# Patient Record
Sex: MALE | Race: BLACK OR AFRICAN AMERICAN | Employment: OTHER | URBAN - METROPOLITAN AREA
[De-identification: names, ages, dates, MRNs, and addresses within clinical notes are randomized per-mention and may not be internally consistent; named-entity substitution may affect disease eponyms.]

---

## 2017-01-02 ENCOUNTER — TELEPHONE (OUTPATIENT)
Dept: SURGERY | Age: 67
End: 2017-01-02

## 2017-01-06 ENCOUNTER — OFFICE VISIT (OUTPATIENT)
Dept: INTERNAL MEDICINE | Age: 67
End: 2017-01-06

## 2017-01-06 VITALS
RESPIRATION RATE: 16 BRPM | HEIGHT: 67 IN | SYSTOLIC BLOOD PRESSURE: 132 MMHG | TEMPERATURE: 97.5 F | WEIGHT: 259 LBS | DIASTOLIC BLOOD PRESSURE: 82 MMHG | HEART RATE: 66 BPM | OXYGEN SATURATION: 98 % | BODY MASS INDEX: 40.65 KG/M2

## 2017-01-06 DIAGNOSIS — Z98.890 S/P COLONOSCOPY WITH POLYPECTOMY: ICD-10-CM

## 2017-01-06 DIAGNOSIS — N52.9 ERECTILE DYSFUNCTION, UNSPECIFIED ERECTILE DYSFUNCTION TYPE: ICD-10-CM

## 2017-01-06 DIAGNOSIS — I10 ESSENTIAL HYPERTENSION, BENIGN: Primary | ICD-10-CM

## 2017-01-06 DIAGNOSIS — E78.5 DYSLIPIDEMIA: ICD-10-CM

## 2017-01-06 DIAGNOSIS — D12.6 TUBULAR ADENOMA OF COLON: ICD-10-CM

## 2017-01-06 DIAGNOSIS — Z11.59 NEED FOR HEPATITIS C SCREENING TEST: ICD-10-CM

## 2017-01-06 DIAGNOSIS — E66.01 MORBID OBESITY DUE TO EXCESS CALORIES (HCC): ICD-10-CM

## 2017-01-06 DIAGNOSIS — Z23 NEED FOR PNEUMOCOCCAL VACCINATION: ICD-10-CM

## 2017-01-06 DIAGNOSIS — Z11.4 SCREENING FOR HIV (HUMAN IMMUNODEFICIENCY VIRUS): ICD-10-CM

## 2017-01-06 PROCEDURE — 90670 PCV13 VACCINE IM: CPT | Performed by: INTERNAL MEDICINE

## 2017-01-06 PROCEDURE — G0009 ADMIN PNEUMOCOCCAL VACCINE: HCPCS | Performed by: INTERNAL MEDICINE

## 2017-01-06 PROCEDURE — 99214 OFFICE O/P EST MOD 30 MIN: CPT | Performed by: INTERNAL MEDICINE

## 2017-01-06 RX ORDER — LISINOPRIL 20 MG/1
TABLET ORAL
Qty: 30 TABLET | Refills: 0 | Status: SHIPPED | OUTPATIENT
Start: 2017-01-06 | End: 2017-04-07 | Stop reason: SDUPTHER

## 2017-01-06 RX ORDER — AMLODIPINE BESYLATE 10 MG/1
TABLET ORAL
Qty: 30 TABLET | Refills: 0 | Status: SHIPPED | OUTPATIENT
Start: 2017-01-06 | End: 2017-04-07 | Stop reason: SDUPTHER

## 2017-01-06 RX ORDER — SIMVASTATIN 20 MG
TABLET ORAL
Qty: 30 TABLET | Refills: 0 | Status: SHIPPED | OUTPATIENT
Start: 2017-01-06 | End: 2017-04-07 | Stop reason: SDUPTHER

## 2017-01-06 RX ORDER — HYDROCHLOROTHIAZIDE 12.5 MG/1
TABLET ORAL
Qty: 30 TABLET | Refills: 0 | Status: SHIPPED | OUTPATIENT
Start: 2017-01-06 | End: 2017-04-07 | Stop reason: SDUPTHER

## 2017-01-06 RX ORDER — TADALAFIL 10 MG/1
TABLET ORAL
Qty: 6 TABLET | Refills: 1 | Status: SHIPPED | OUTPATIENT
Start: 2017-01-06 | End: 2018-03-12 | Stop reason: SDUPTHER

## 2017-01-06 ASSESSMENT — ENCOUNTER SYMPTOMS
SHORTNESS OF BREATH: 0
ABDOMINAL PAIN: 0

## 2017-01-07 ASSESSMENT — ENCOUNTER SYMPTOMS
NAUSEA: 0
CONSTIPATION: 0
VOMITING: 0

## 2017-03-27 DIAGNOSIS — Z11.59 NEED FOR HEPATITIS C SCREENING TEST: ICD-10-CM

## 2017-03-27 DIAGNOSIS — E78.5 DYSLIPIDEMIA: ICD-10-CM

## 2017-03-27 DIAGNOSIS — I10 ESSENTIAL HYPERTENSION, BENIGN: ICD-10-CM

## 2017-03-27 DIAGNOSIS — Z11.4 SCREENING FOR HIV (HUMAN IMMUNODEFICIENCY VIRUS): ICD-10-CM

## 2017-03-27 LAB
ALBUMIN SERPL-MCNC: 3.9 G/DL (ref 3.9–4.9)
ALP BLD-CCNC: 81 U/L (ref 35–104)
ALT SERPL-CCNC: 13 U/L (ref 0–41)
ANION GAP SERPL CALCULATED.3IONS-SCNC: 10 MEQ/L (ref 7–13)
AST SERPL-CCNC: 17 U/L (ref 0–40)
BILIRUB SERPL-MCNC: 0.8 MG/DL (ref 0–1.2)
BILIRUBIN URINE: NEGATIVE
BLOOD, URINE: NEGATIVE
BUN BLDV-MCNC: 11 MG/DL (ref 8–23)
CALCIUM SERPL-MCNC: 9.8 MG/DL (ref 8.6–10.2)
CHLORIDE BLD-SCNC: 103 MEQ/L (ref 98–107)
CHOLESTEROL, TOTAL: 174 MG/DL (ref 0–199)
CLARITY: CLEAR
CO2: 27 MEQ/L (ref 22–29)
COLOR: YELLOW
CREAT SERPL-MCNC: 0.91 MG/DL (ref 0.7–1.2)
GFR AFRICAN AMERICAN: >60
GFR NON-AFRICAN AMERICAN: >60
GLOBULIN: 2.9 G/DL (ref 2.3–3.5)
GLUCOSE BLD-MCNC: 119 MG/DL (ref 74–109)
GLUCOSE URINE: NEGATIVE MG/DL
HCT VFR BLD CALC: 41.7 % (ref 42–52)
HDLC SERPL-MCNC: 50 MG/DL (ref 40–59)
HEMOGLOBIN: 13.4 G/DL (ref 14–18)
HEPATITIS C ANTIBODY INTERPRETATION: REACTIVE
KETONES, URINE: NEGATIVE MG/DL
LDL CHOLESTEROL CALCULATED: 106 MG/DL (ref 0–129)
LEUKOCYTE ESTERASE, URINE: NEGATIVE
MCH RBC QN AUTO: 28.5 PG (ref 27–31.3)
MCHC RBC AUTO-ENTMCNC: 32.2 % (ref 33–37)
MCV RBC AUTO: 88.5 FL (ref 80–100)
NITRITE, URINE: NEGATIVE
PDW BLD-RTO: 12.8 % (ref 11.5–14.5)
PH UA: 6.5 (ref 5–9)
PLATELET # BLD: 212 K/UL (ref 130–400)
POTASSIUM SERPL-SCNC: 4.1 MEQ/L (ref 3.5–5.1)
PROTEIN UA: NEGATIVE MG/DL
RBC # BLD: 4.71 M/UL (ref 4.7–6.1)
SODIUM BLD-SCNC: 140 MEQ/L (ref 132–144)
SPECIFIC GRAVITY UA: 1.02 (ref 1–1.03)
TOTAL PROTEIN: 6.8 G/DL (ref 6.4–8.1)
TRIGL SERPL-MCNC: 91 MG/DL (ref 0–200)
TSH SERPL DL<=0.05 MIU/L-ACNC: 0.55 UIU/ML (ref 0.27–4.2)
UROBILINOGEN, URINE: 1 E.U./DL
WBC # BLD: 8.3 K/UL (ref 4.8–10.8)

## 2017-03-28 LAB — HIV-1 AND HIV-2 ANTIBODIES: NEGATIVE

## 2017-04-07 ENCOUNTER — OFFICE VISIT (OUTPATIENT)
Dept: INTERNAL MEDICINE | Age: 67
End: 2017-04-07

## 2017-04-07 VITALS
DIASTOLIC BLOOD PRESSURE: 74 MMHG | RESPIRATION RATE: 16 BRPM | HEART RATE: 78 BPM | BODY MASS INDEX: 40.49 KG/M2 | WEIGHT: 258 LBS | HEIGHT: 67 IN | TEMPERATURE: 97.6 F | OXYGEN SATURATION: 98 % | SYSTOLIC BLOOD PRESSURE: 130 MMHG

## 2017-04-07 DIAGNOSIS — I10 ESSENTIAL HYPERTENSION, BENIGN: ICD-10-CM

## 2017-04-07 DIAGNOSIS — D64.9 NORMOCYTIC ANEMIA: ICD-10-CM

## 2017-04-07 DIAGNOSIS — E66.01 MORBID OBESITY DUE TO EXCESS CALORIES (HCC): ICD-10-CM

## 2017-04-07 DIAGNOSIS — E78.5 DYSLIPIDEMIA: ICD-10-CM

## 2017-04-07 DIAGNOSIS — B19.20 HEPATITIS C VIRUS INFECTION WITHOUT HEPATIC COMA, UNSPECIFIED CHRONICITY: Primary | ICD-10-CM

## 2017-04-07 DIAGNOSIS — R76.8 HEPATITIS C ANTIBODY TEST POSITIVE: ICD-10-CM

## 2017-04-07 PROCEDURE — 3017F COLORECTAL CA SCREEN DOC REV: CPT | Performed by: INTERNAL MEDICINE

## 2017-04-07 PROCEDURE — 1036F TOBACCO NON-USER: CPT | Performed by: INTERNAL MEDICINE

## 2017-04-07 PROCEDURE — 99214 OFFICE O/P EST MOD 30 MIN: CPT | Performed by: INTERNAL MEDICINE

## 2017-04-07 PROCEDURE — 1123F ACP DISCUSS/DSCN MKR DOCD: CPT | Performed by: INTERNAL MEDICINE

## 2017-04-07 PROCEDURE — G8428 CUR MEDS NOT DOCUMENT: HCPCS | Performed by: INTERNAL MEDICINE

## 2017-04-07 PROCEDURE — 4040F PNEUMOC VAC/ADMIN/RCVD: CPT | Performed by: INTERNAL MEDICINE

## 2017-04-07 PROCEDURE — G8417 CALC BMI ABV UP PARAM F/U: HCPCS | Performed by: INTERNAL MEDICINE

## 2017-04-07 RX ORDER — AMLODIPINE BESYLATE 10 MG/1
TABLET ORAL
Qty: 90 TABLET | Refills: 0 | Status: SHIPPED | OUTPATIENT
Start: 2017-04-07 | End: 2017-07-05 | Stop reason: SDUPTHER

## 2017-04-07 RX ORDER — LISINOPRIL 20 MG/1
TABLET ORAL
Qty: 90 TABLET | Refills: 0 | Status: SHIPPED | OUTPATIENT
Start: 2017-04-07 | End: 2017-07-05 | Stop reason: SDUPTHER

## 2017-04-07 RX ORDER — SIMVASTATIN 20 MG
TABLET ORAL
Qty: 90 TABLET | Refills: 1 | Status: SHIPPED | OUTPATIENT
Start: 2017-04-07 | End: 2017-07-05 | Stop reason: SDUPTHER

## 2017-04-07 RX ORDER — HYDROCHLOROTHIAZIDE 12.5 MG/1
TABLET ORAL
Qty: 90 TABLET | Refills: 0 | Status: SHIPPED | OUTPATIENT
Start: 2017-04-07 | End: 2017-07-05 | Stop reason: SDUPTHER

## 2017-04-12 DIAGNOSIS — R76.8 HEPATITIS C ANTIBODY TEST POSITIVE: ICD-10-CM

## 2017-04-12 DIAGNOSIS — B19.20 HEPATITIS C VIRUS INFECTION WITHOUT HEPATIC COMA, UNSPECIFIED CHRONICITY: ICD-10-CM

## 2017-04-12 LAB
ETHANOL PERCENT: NORMAL G/DL
ETHANOL: <10 MG/DL (ref 0–0.08)
HBV SURFACE AB TITR SER: REACTIVE MIU/ML

## 2017-04-13 LAB — AFP-TUMOR MARKER: 2 NG/ML (ref 0–9)

## 2017-04-14 LAB
AMPHETAMINE SCREEN, URINE: NEGATIVE NG/ML
BARBITURATE SCREEN URINE: NEGATIVE NG/ML
BENZODIAZEPINE SCREEN, URINE: NEGATIVE NG/ML
CANNABINOID SCREEN URINE: NEGATIVE NG/ML
COCAINE METABOLITE SCREEN URINE: NEGATIVE NG/ML
CREATININE URINE: 179.3 MG/DL (ref 20–400)
Lab: NORMAL
MDMA URINE: NEGATIVE NG/ML
OPIATE SCREEN URINE: NEGATIVE NG/ML
OXYCODONE SCREEN URINE: NEGATIVE NG/ML
PHENCYCLIDINE SCREEN URINE: NEGATIVE NG/ML

## 2017-04-15 LAB
EER HEPATITIS C RNA PCR QUANT W/ GENOTYPE RFLX: NORMAL
HCV RNA QNT REAL-TIME PCR INTERP: NOT DETECTED
HCV RNA, QUANTITATIVE REAL TIME PCR: <1.2 LOG IU
HEPATITIS C RNA PCR QUANT: <15 IU/ML

## 2017-04-17 LAB
Lab: 5661
REPORT: NORMAL
THIS TEST SENT TO: NORMAL

## 2017-04-22 ASSESSMENT — ENCOUNTER SYMPTOMS
COUGH: 0
NAUSEA: 0
BACK PAIN: 0
ABDOMINAL PAIN: 0
COLOR CHANGE: 0
CONSTIPATION: 0
VOMITING: 0
ABDOMINAL DISTENTION: 0
DIARRHEA: 0
SHORTNESS OF BREATH: 0
BLOOD IN STOOL: 0
WHEEZING: 0

## 2017-07-05 ENCOUNTER — OFFICE VISIT (OUTPATIENT)
Dept: INTERNAL MEDICINE | Age: 67
End: 2017-07-05

## 2017-07-05 VITALS
TEMPERATURE: 97.4 F | HEART RATE: 64 BPM | OXYGEN SATURATION: 100 % | HEIGHT: 67 IN | BODY MASS INDEX: 40.02 KG/M2 | RESPIRATION RATE: 16 BRPM | DIASTOLIC BLOOD PRESSURE: 94 MMHG | SYSTOLIC BLOOD PRESSURE: 174 MMHG | WEIGHT: 255 LBS

## 2017-07-05 DIAGNOSIS — R76.8 HEPATITIS C ANTIBODY TEST POSITIVE: ICD-10-CM

## 2017-07-05 DIAGNOSIS — E78.5 DYSLIPIDEMIA: ICD-10-CM

## 2017-07-05 DIAGNOSIS — I10 ESSENTIAL HYPERTENSION, BENIGN: Primary | ICD-10-CM

## 2017-07-05 DIAGNOSIS — E66.09 NON MORBID OBESITY DUE TO EXCESS CALORIES: ICD-10-CM

## 2017-07-05 DIAGNOSIS — Z91.14 NONCOMPLIANCE WITH MEDICATION REGIMEN: ICD-10-CM

## 2017-07-05 DIAGNOSIS — R76.8 HEPATITIS B ANTIBODY POSITIVE: ICD-10-CM

## 2017-07-05 PROCEDURE — 3017F COLORECTAL CA SCREEN DOC REV: CPT | Performed by: INTERNAL MEDICINE

## 2017-07-05 PROCEDURE — 1123F ACP DISCUSS/DSCN MKR DOCD: CPT | Performed by: INTERNAL MEDICINE

## 2017-07-05 PROCEDURE — 4040F PNEUMOC VAC/ADMIN/RCVD: CPT | Performed by: INTERNAL MEDICINE

## 2017-07-05 PROCEDURE — G8417 CALC BMI ABV UP PARAM F/U: HCPCS | Performed by: INTERNAL MEDICINE

## 2017-07-05 PROCEDURE — 1036F TOBACCO NON-USER: CPT | Performed by: INTERNAL MEDICINE

## 2017-07-05 PROCEDURE — 99213 OFFICE O/P EST LOW 20 MIN: CPT | Performed by: INTERNAL MEDICINE

## 2017-07-05 PROCEDURE — G8427 DOCREV CUR MEDS BY ELIG CLIN: HCPCS | Performed by: INTERNAL MEDICINE

## 2017-07-05 RX ORDER — HYDROCHLOROTHIAZIDE 12.5 MG/1
TABLET ORAL
Qty: 30 TABLET | Refills: 1 | Status: SHIPPED | OUTPATIENT
Start: 2017-07-05 | End: 2017-08-18 | Stop reason: SDUPTHER

## 2017-07-05 RX ORDER — LISINOPRIL 20 MG/1
TABLET ORAL
Qty: 30 TABLET | Refills: 1 | Status: SHIPPED | OUTPATIENT
Start: 2017-07-05 | End: 2017-08-18 | Stop reason: SDUPTHER

## 2017-07-05 RX ORDER — AMLODIPINE BESYLATE 10 MG/1
TABLET ORAL
Qty: 30 TABLET | Refills: 1 | Status: SHIPPED | OUTPATIENT
Start: 2017-07-05 | End: 2017-08-18 | Stop reason: SDUPTHER

## 2017-07-05 RX ORDER — SIMVASTATIN 20 MG
TABLET ORAL
Qty: 30 TABLET | Refills: 1 | Status: SHIPPED | OUTPATIENT
Start: 2017-07-05 | End: 2017-08-18 | Stop reason: SDUPTHER

## 2017-07-05 ASSESSMENT — ENCOUNTER SYMPTOMS
SHORTNESS OF BREATH: 0
DIARRHEA: 0
CONSTIPATION: 0
ABDOMINAL PAIN: 0
COUGH: 0

## 2017-08-03 ENCOUNTER — HOSPITAL ENCOUNTER (EMERGENCY)
Age: 67
Discharge: HOME | End: 2017-08-03
Payer: MEDICARE

## 2017-08-03 VITALS
SYSTOLIC BLOOD PRESSURE: 174 MMHG | WEIGHT: 250 LBS | DIASTOLIC BLOOD PRESSURE: 88 MMHG | BODY MASS INDEX: 39.24 KG/M2 | TEMPERATURE: 98.2 F | RESPIRATION RATE: 18 BRPM | HEART RATE: 60 BPM | OXYGEN SATURATION: 98 % | HEIGHT: 67 IN

## 2017-08-03 DIAGNOSIS — N30.01 ACUTE CYSTITIS WITH HEMATURIA: ICD-10-CM

## 2017-08-03 DIAGNOSIS — N34.2 URETHRITIS: Primary | ICD-10-CM

## 2017-08-03 LAB
BACTERIA: ABNORMAL /HPF
BILIRUBIN URINE: NEGATIVE
BLOOD, URINE: ABNORMAL
CLARITY: ABNORMAL
COLOR: YELLOW
EPITHELIAL CELLS, UA: ABNORMAL /HPF
GLUCOSE URINE: NEGATIVE MG/DL
KETONES, URINE: NEGATIVE MG/DL
LEUKOCYTE ESTERASE, URINE: ABNORMAL
MUCUS: PRESENT
NITRITE, URINE: NEGATIVE
PH UA: 6 (ref 5–9)
PROTEIN UA: 30 MG/DL
RBC UA: ABNORMAL /HPF (ref 0–2)
SPECIFIC GRAVITY UA: 1.02 (ref 1–1.03)
UROBILINOGEN, URINE: 1 E.U./DL
WBC UA: ABNORMAL /HPF (ref 0–5)

## 2017-08-03 PROCEDURE — 81001 URINALYSIS AUTO W/SCOPE: CPT

## 2017-08-03 PROCEDURE — 2580000003 HC RX 258

## 2017-08-03 PROCEDURE — 96372 THER/PROPH/DIAG INJ SC/IM: CPT

## 2017-08-03 PROCEDURE — 99283 EMERGENCY DEPT VISIT LOW MDM: CPT

## 2017-08-03 PROCEDURE — 87491 CHLMYD TRACH DNA AMP PROBE: CPT

## 2017-08-03 PROCEDURE — A4216 STERILE WATER/SALINE, 10 ML: HCPCS

## 2017-08-03 PROCEDURE — 87591 N.GONORRHOEAE DNA AMP PROB: CPT

## 2017-08-03 PROCEDURE — 6370000000 HC RX 637 (ALT 250 FOR IP): Performed by: PHYSICIAN ASSISTANT

## 2017-08-03 PROCEDURE — 6360000002 HC RX W HCPCS: Performed by: PHYSICIAN ASSISTANT

## 2017-08-03 RX ORDER — METRONIDAZOLE 500 MG/1
2000 TABLET ORAL ONCE
Status: COMPLETED | OUTPATIENT
Start: 2017-08-03 | End: 2017-08-03

## 2017-08-03 RX ORDER — CEFTRIAXONE SODIUM 250 MG/1
250 INJECTION, POWDER, FOR SOLUTION INTRAMUSCULAR; INTRAVENOUS ONCE
Status: COMPLETED | OUTPATIENT
Start: 2017-08-03 | End: 2017-08-03

## 2017-08-03 RX ORDER — AZITHROMYCIN 250 MG/1
1000 TABLET, FILM COATED ORAL ONCE
Status: COMPLETED | OUTPATIENT
Start: 2017-08-03 | End: 2017-08-03

## 2017-08-03 RX ORDER — CIPROFLOXACIN 500 MG/1
500 TABLET, FILM COATED ORAL 2 TIMES DAILY
Qty: 20 TABLET | Refills: 0 | Status: SHIPPED | OUTPATIENT
Start: 2017-08-03 | End: 2017-08-13

## 2017-08-03 RX ADMIN — WATER 10 ML: 1 INJECTION INTRAMUSCULAR; INTRAVENOUS; SUBCUTANEOUS at 14:47

## 2017-08-03 RX ADMIN — CEFTRIAXONE 250 MG: 250 INJECTION, POWDER, FOR SOLUTION INTRAMUSCULAR; INTRAVENOUS at 14:45

## 2017-08-03 RX ADMIN — AZITHROMYCIN 1000 MG: 250 TABLET, FILM COATED ORAL at 14:46

## 2017-08-03 RX ADMIN — METRONIDAZOLE 2000 MG: 500 TABLET ORAL at 14:47

## 2017-08-03 ASSESSMENT — ENCOUNTER SYMPTOMS
EYES NEGATIVE: 1
GASTROINTESTINAL NEGATIVE: 1
RESPIRATORY NEGATIVE: 1

## 2017-08-07 LAB
C. TRACHOMATIS DNA ,URINE: POSITIVE
N. GONORRHOEAE DNA, URINE: NEGATIVE

## 2017-08-18 DIAGNOSIS — R30.0 DYSURIA: ICD-10-CM

## 2017-08-18 DIAGNOSIS — Z86.19 HISTORY OF CHLAMYDIA: ICD-10-CM

## 2017-08-22 LAB
C. TRACHOMATIS DNA ,URINE: NEGATIVE
N. GONORRHOEAE DNA, URINE: NEGATIVE

## 2018-03-12 ENCOUNTER — OFFICE VISIT (OUTPATIENT)
Dept: FAMILY MEDICINE CLINIC | Age: 68
End: 2018-03-12
Payer: MEDICARE

## 2018-03-12 VITALS
BODY MASS INDEX: 40.74 KG/M2 | DIASTOLIC BLOOD PRESSURE: 80 MMHG | OXYGEN SATURATION: 98 % | SYSTOLIC BLOOD PRESSURE: 108 MMHG | WEIGHT: 259.6 LBS | TEMPERATURE: 96.9 F | HEART RATE: 78 BPM | HEIGHT: 67 IN | RESPIRATION RATE: 16 BRPM

## 2018-03-12 DIAGNOSIS — E66.01 MORBID OBESITY WITH BMI OF 40.0-44.9, ADULT (HCC): ICD-10-CM

## 2018-03-12 DIAGNOSIS — E78.5 DYSLIPIDEMIA: ICD-10-CM

## 2018-03-12 DIAGNOSIS — Z76.0 MEDICATION REFILL: ICD-10-CM

## 2018-03-12 DIAGNOSIS — N52.9 ERECTILE DYSFUNCTION, UNSPECIFIED ERECTILE DYSFUNCTION TYPE: ICD-10-CM

## 2018-03-12 DIAGNOSIS — I10 ESSENTIAL HYPERTENSION, BENIGN: ICD-10-CM

## 2018-03-12 PROCEDURE — 3288F FALL RISK ASSESSMENT DOCD: CPT | Performed by: PHYSICIAN ASSISTANT

## 2018-03-12 PROCEDURE — 99214 OFFICE O/P EST MOD 30 MIN: CPT | Performed by: PHYSICIAN ASSISTANT

## 2018-03-12 PROCEDURE — G8510 SCR DEP NEG, NO PLAN REQD: HCPCS | Performed by: PHYSICIAN ASSISTANT

## 2018-03-12 RX ORDER — HYDROCHLOROTHIAZIDE 12.5 MG/1
TABLET ORAL
Qty: 30 TABLET | Refills: 5 | Status: SHIPPED | OUTPATIENT
Start: 2018-03-12 | End: 2019-04-29 | Stop reason: SDUPTHER

## 2018-03-12 RX ORDER — SIMVASTATIN 20 MG
TABLET ORAL
Qty: 30 TABLET | Refills: 5 | Status: SHIPPED | OUTPATIENT
Start: 2018-03-12 | End: 2019-04-29 | Stop reason: SDUPTHER

## 2018-03-12 RX ORDER — LATANOPROST 50 UG/ML
SOLUTION/ DROPS OPHTHALMIC
COMMUNITY
Start: 2018-01-10

## 2018-03-12 RX ORDER — TADALAFIL 10 MG/1
TABLET ORAL
Qty: 6 TABLET | Refills: 1 | Status: SHIPPED | OUTPATIENT
Start: 2018-03-12 | End: 2020-05-04 | Stop reason: SDUPTHER

## 2018-03-12 RX ORDER — LISINOPRIL 20 MG/1
TABLET ORAL
Qty: 30 TABLET | Refills: 5 | Status: SHIPPED | OUTPATIENT
Start: 2018-03-12 | End: 2019-04-29 | Stop reason: SDUPTHER

## 2018-03-12 RX ORDER — DORZOLAMIDE HYDROCHLORIDE AND TIMOLOL MALEATE 20; 5 MG/ML; MG/ML
SOLUTION/ DROPS OPHTHALMIC
COMMUNITY
Start: 2018-01-10

## 2018-03-12 RX ORDER — AMLODIPINE BESYLATE 10 MG/1
TABLET ORAL
Qty: 30 TABLET | Refills: 5 | Status: SHIPPED | OUTPATIENT
Start: 2018-03-12 | End: 2019-04-29 | Stop reason: SDUPTHER

## 2018-03-12 ASSESSMENT — ENCOUNTER SYMPTOMS
ABDOMINAL PAIN: 0
COUGH: 0
CONSTIPATION: 0
TROUBLE SWALLOWING: 0
NAUSEA: 0
CHEST TIGHTNESS: 0
EYE REDNESS: 0
WHEEZING: 0
BLOOD IN STOOL: 0
EYE PAIN: 0
SHORTNESS OF BREATH: 0
COLOR CHANGE: 0
ABDOMINAL DISTENTION: 0
BACK PAIN: 0
DIARRHEA: 0
PHOTOPHOBIA: 0
SINUS PRESSURE: 0

## 2018-03-12 ASSESSMENT — PATIENT HEALTH QUESTIONNAIRE - PHQ9
2. FEELING DOWN, DEPRESSED OR HOPELESS: 0
SUM OF ALL RESPONSES TO PHQ9 QUESTIONS 1 & 2: 0
SUM OF ALL RESPONSES TO PHQ QUESTIONS 1-9: 0
1. LITTLE INTEREST OR PLEASURE IN DOING THINGS: 0

## 2018-03-12 NOTE — PROGRESS NOTES
Negative for dizziness, weakness, light-headedness and headaches. Psychiatric/Behavioral: Negative for agitation, confusion, dysphoric mood and sleep disturbance. The patient is not nervous/anxious. Past Medical History:   Diagnosis Date    Diabetes mellitus, type 2 ([address] 10/11/2011    Dyslipidemia 10/11/2011    Erectile dysfunction 7/19/2012    Essential hypertension, benign 10/11/2011    History of colon polyps     adenomatous polyps     Past Surgical History:   Procedure Laterality Date    [facility]    COLONOSCOPY  9/26/11    Dr Gwen Jimenez    COLONOSCOPY N/A 11/11/2016    COLONOSCOPY performed by Andrea Sanz MD at [place] [id number] Right 5/1/14    [facility]     Social History     Social History    Marital status: Single     Spouse name: N/A    Number of children: N/A    Years of education: N/A     Occupational History    Not on file. Social History Main Topics    Smoking status: Former Smoker     Packs/day: 1.50     Years: 30.00     Types: Cigarettes     Quit date: 1/1/2000    Smokeless tobacco: Never Used    Alcohol use No    Drug use: Unknown    Sexual activity: Not on file     Other Topics Concern    Not on file     Social History Narrative    No narrative on file     History reviewed. No pertinent family history. No Known Allergies  Current Outpatient Prescriptions   Medication Sig Dispense Refill    latanoprost (XALATAN) 0.005 % ophthalmic solution Use 1 Drop in both eyes daily at bedtime.  dorzolamide-timolol (COSOPT) 22.3-6.8 MG/ML ophthalmic solution Use 1 Drop in both eyes twice daily.       amLODIPine (NORVASC) 10 MG tablet take 1 tablet by mouth once daily 30 tablet 5    hydrochlorothiazide (HYDRODIURIL) 12.5 MG tablet take 1 tablet by mouth once daily 30 tablet 5    lisinopril (PRINIVIL;ZESTRIL) 20 MG tablet take 1 tablet by mouth once daily 30 tablet 5    simvastatin (ZOCOR) 20 MG tablet take 1 tablet by mouth

## 2019-05-09 ENCOUNTER — OFFICE VISIT (OUTPATIENT)
Dept: FAMILY MEDICINE CLINIC | Age: 69
End: 2019-05-09
Payer: MEDICARE

## 2019-05-09 VITALS
BODY MASS INDEX: 41.44 KG/M2 | OXYGEN SATURATION: 98 % | WEIGHT: 264 LBS | TEMPERATURE: 98.1 F | HEIGHT: 67 IN | SYSTOLIC BLOOD PRESSURE: 138 MMHG | HEART RATE: 60 BPM | RESPIRATION RATE: 16 BRPM | DIASTOLIC BLOOD PRESSURE: 72 MMHG

## 2019-05-09 DIAGNOSIS — Z76.0 MEDICATION REFILL: ICD-10-CM

## 2019-05-09 DIAGNOSIS — Z13.6 SCREENING FOR AAA (AORTIC ABDOMINAL ANEURYSM): ICD-10-CM

## 2019-05-09 DIAGNOSIS — R73.09 ABNORMAL GLUCOSE: Chronic | ICD-10-CM

## 2019-05-09 DIAGNOSIS — E66.01 MORBID OBESITY WITH BMI OF 40.0-44.9, ADULT (HCC): ICD-10-CM

## 2019-05-09 DIAGNOSIS — E78.5 DYSLIPIDEMIA: ICD-10-CM

## 2019-05-09 DIAGNOSIS — I10 ESSENTIAL HYPERTENSION, BENIGN: Primary | ICD-10-CM

## 2019-05-09 DIAGNOSIS — Z20.9 EXPOSURE TO COMMUNICABLE DISEASE: ICD-10-CM

## 2019-05-09 DIAGNOSIS — E66.01 CLASS 3 SEVERE OBESITY DUE TO EXCESS CALORIES WITHOUT SERIOUS COMORBIDITY WITH BODY MASS INDEX (BMI) OF 40.0 TO 44.9 IN ADULT (HCC): ICD-10-CM

## 2019-05-09 DIAGNOSIS — Z23 NEED FOR PNEUMOCOCCAL VACCINATION: ICD-10-CM

## 2019-05-09 PROCEDURE — 90670 PCV13 VACCINE IM: CPT | Performed by: FAMILY MEDICINE

## 2019-05-09 PROCEDURE — 99214 OFFICE O/P EST MOD 30 MIN: CPT | Performed by: FAMILY MEDICINE

## 2019-05-09 PROCEDURE — G0009 ADMIN PNEUMOCOCCAL VACCINE: HCPCS | Performed by: FAMILY MEDICINE

## 2019-05-09 ASSESSMENT — PATIENT HEALTH QUESTIONNAIRE - PHQ9
1. LITTLE INTEREST OR PLEASURE IN DOING THINGS: 0
SUM OF ALL RESPONSES TO PHQ QUESTIONS 1-9: 0
SUM OF ALL RESPONSES TO PHQ9 QUESTIONS 1 & 2: 0
2. FEELING DOWN, DEPRESSED OR HOPELESS: 0
SUM OF ALL RESPONSES TO PHQ QUESTIONS 1-9: 0

## 2019-05-09 NOTE — PROGRESS NOTES
Subjective  Radha Rasp, 77 y.o. male presents today with:  Chief Complaint   Patient presents with    Annual Exam     pt states he is here for annual with labs. pt states he is not sexually active but he would also like STI. HPI    This is a new patient to me. I have reviewed the past medical and surgical history, social history and family history provided. I have reviewed  medication, previous testing and working diagnoses. I have reviewed the allergies and health maintenance information and correlated it into my decision making for this patient for care, diagnostics, consultations and treatment for today's visit. Patient is here for f/u HTN. Is compliant with meds and has no side effects from them. Avoids added salt. Tries to eat healthy. Exercises rarely. Has no chest pain, palpitations or edema. Does get short of breath after walking a block or two. Patient is concerned about sexually-transmitted infections including HIV. He states that he is not sexually active but may have some lapses in judgment occurring occasionally    No other questions and or concerns for today's visit      Review of Systems  No fevers, chills, sweats. No unintended weight loss. No abdominal pain, nausea, vomiting, diarrhea, constipation, bloody stools, black tarry stools. No rashes. No swollen glands. No penile lesions or discharge. No hematuria. No dysuria.     Past Medical History:   Diagnosis Date    Diabetes mellitus, type 2 ([address] 10/11/2011    Dyslipidemia 10/11/2011    Erectile dysfunction 7/19/2012    Essential hypertension, benign 10/11/2011    History of colon polyps     adenomatous polyps     Past Surgical History:   Procedure Laterality Date    [facility]    COLONOSCOPY  9/26/11    Dr Abraham Hughes    COLONOSCOPY N/A 11/11/2016    COLONOSCOPY performed by Silver Phillips MD at [place] [id number] Right 5/1/14    [facility]     Social History Socioeconomic History    Marital status: Single     Spouse name: Not on file    Number of children: Not on file    Years of education: Not on file    Highest education level: Not on file   Occupational History    Not on file   Social Needs    Financial resource strain: Not on file    Food insecurity:     Worry: Not on file     Inability: Not on file    Transportation needs:     Medical: Not on file     Non-medical: Not on file   Tobacco Use    Smoking status: Former Smoker     Packs/day: 1.50     Years: 30.00     Pack years: 45.00     Types: Cigarettes     Last attempt to quit: 2000     Years since quittin.3    Smokeless tobacco: Never Used   Substance and Sexual Activity    Alcohol use: No    Drug use: Not on file    Sexual activity: Not on file   Lifestyle    Physical activity:     Days per week: Not on file     Minutes per session: Not on file    Stress: Not on file   Relationships    Social connections:     Talks on phone: Not on file     Gets together: Not on file     Attends Mandaen service: Not on file     Active member of club or organization: Not on file     Attends meetings of clubs or organizations: Not on file     Relationship status: Not on file    Intimate partner violence:     Fear of current or ex partner: Not on file     Emotionally abused: Not on file     Physically abused: Not on file     Forced sexual activity: Not on file   Other Topics Concern    Not on file   Social History Narrative    Not on file     No family history on file. Allergies   Allergen Reactions    Apraclonidine      Other reaction(s): Other: See Comments  Irritation, not tolerable     Brimonidine      Other reaction(s):  Other: See Comments  Follicles      Current Outpatient Medications   Medication Sig Dispense Refill    amLODIPine (NORVASC) 10 MG tablet take 1 tablet by mouth once daily 30 tablet 0    simvastatin (ZOCOR) 20 MG tablet take 1 tablet by mouth at bedtime 30 tablet 0    hydrochlorothiazide (HYDRODIURIL) 12.5 MG tablet take 1 tablet by mouth once daily 30 tablet 5    lisinopril (PRINIVIL;ZESTRIL) 20 MG tablet take 1 tablet by mouth once daily 30 tablet 0    latanoprost (XALATAN) 0.005 % ophthalmic solution Use 1 Drop in both eyes daily at bedtime.  dorzolamide-timolol (COSOPT) 22.3-6.8 MG/ML ophthalmic solution Use 1 Drop in both eyes twice daily.  tadalafil (CIALIS) 10 MG tablet Take 1 tablet by mouth at least 60 minutes before sexual intercourse once daily as needed for Erectile Dysfunction 6 tablet 1    aspirin 81 MG EC tablet Take 81 mg by mouth daily. No current facility-administered medications for this visit. PMH, Surgical Hx, Family Hx, and Social Hxreviewed and updated. Health Maintenance reviewed. Objective    Vitals:    05/09/19 1050   BP: 138/72   Pulse: 60   Resp: 16   Temp: 98.1 °F (36.7 °C)   SpO2: 98%   Weight: 264 lb (119.7 kg)   Height: 5' 7\" (1.702 m)        Physical Exam   Constitutional: He is oriented to person, place, and time. Obese, NAD   HENT:   Head: Normocephalic and atraumatic. Eyes: Conjunctivae are normal.   Neck: Normal range of motion. Neck supple. Carotid bruit is not present. No thyromegaly present. Cardiovascular: Normal rate, regular rhythm, S1 normal, S2 normal and normal heart sounds. Pulmonary/Chest: Effort normal. He has no wheezes. He has no rales. Musculoskeletal: He exhibits no edema. Neurological: He is alert and oriented to person, place, and time. Skin: Skin is warm and dry. Psychiatric: He has a normal mood and affect.          Lab Results   Component Value Date    LABA1C 4.7 (L) 12/08/2015    LABA1C 5.0 08/05/2014    LABA1C 4.9 08/02/2013     Lab Results   Component Value Date    CREATININE 0.91 03/27/2017     Lab Results   Component Value Date    ALT 13 03/27/2017    AST 17 03/27/2017     Lab Results   Component Value Date    CHOL 174 03/27/2017    TRIG 91 03/27/2017    HDL 50 03/27/2017 [address] 106 03/27/2017        Assessment & Plan   Visit Diagnoses and Associated Orders     Essential hypertension, benign    -  Primary    Stable and well-controlled on amlodipine, hydrochlorothiazide, lisinopril. Encouraged healthy lifestyle choices and weight reduction. Class 3 severe obesity due to excess calories without serious comorbidity with body mass index (BMI) of 40.0 to 44.9 in adult [facility])        CBC With Auto Differential [id number] Custom]   - Future Order    Comprehensive Metabolic Panel [[id number] Custom]   - Future Order    Lipid, Fasting [[id number] Custom]   - Future Order         Exposure to communicable disease        Encouraged patient to go to [facility] for HIV and RPR because Medicare will not pay for it. Otherwise GC chlamydia ordered. C.trachomatis N.gonorrhoeae DNA, Urine [[id number] Custom]   - Future Order         Screening for AAA (aortic abdominal aneurysm)         SCREENING FOR AAA [ Custom]   - Future Order         Need for pneumococcal vaccination        PREVNAR 13 IM (Pneumococcal conjugate vaccine 13-valent) [[id number] Custom]           Medication refill             Dyslipidemia        Continue simvastatin. Follow labs. Morbid obesity with BMI of 40.0-44.9, adult (HCC)             Abnormal glucose        Reviewed healthy, low carb diet and importance of weight reduction. Hemoglobin A1C [[id number] Custom]   - Future Order             Reviewed complications of obesity. Discussed healthy, low carb diet and need for moderately rigorous exercise 45 minutes 5 days a week.     Reviewed with the patient: all disease processes, current clinical status, medications, activities and diet.      Side effects, adverse effects of the medication prescribed today, as well as treatment plan/ rationale and result expectations have been discussed with the patient who expresses understanding and desires to proceed.     Close follow up to evaluate treatment results and for coordination of care. I have reviewed the patient's medical history in detail and updated the computerized patient record. More than 50% of the appointment was spent in face-to-face counseling, education and care coordination. Orders Placed This Encounter   Procedures    C.trachomatis N.gonorrhoeae DNA, Urine     Standing Status:   Future     Standing Expiration Date:   5/9/2020     SCREENING FOR AAA     Standing Status:   Future     Standing Expiration Date:   5/9/2020     Order Specific Question:   Reason for exam:     Answer:   AAA screen    PREVNAR 13 IM (Pneumococcal conjugate vaccine 13-valent)    CBC With Auto Differential     Standing Status:   Future     Standing Expiration Date:   5/9/2020    Comprehensive Metabolic Panel     Standing Status:   Future     Standing Expiration Date:   5/9/2020    Lipid, Fasting     Standing Status:   Future     Standing Expiration Date:   5/9/2020    Hemoglobin A1C     Standing Status:   Future     Standing Expiration Date:   5/9/2020     No orders of the defined types were placed in this encounter. There are no discontinued medications. No follow-ups on file. Controlled Substances Monitoring:     No flowsheet data found.     Cherelle Fontanez MD

## 2019-05-10 DIAGNOSIS — E66.01 CLASS 3 SEVERE OBESITY DUE TO EXCESS CALORIES WITHOUT SERIOUS COMORBIDITY WITH BODY MASS INDEX (BMI) OF 40.0 TO 44.9 IN ADULT (HCC): ICD-10-CM

## 2019-05-10 DIAGNOSIS — R73.09 ABNORMAL GLUCOSE: Chronic | ICD-10-CM

## 2019-05-10 LAB
ALBUMIN SERPL-MCNC: 4 G/DL (ref 3.5–4.6)
ALP BLD-CCNC: 74 U/L (ref 35–104)
ALT SERPL-CCNC: 10 U/L (ref 0–41)
ANION GAP SERPL CALCULATED.3IONS-SCNC: 15 MEQ/L (ref 9–15)
AST SERPL-CCNC: 13 U/L (ref 0–40)
BASOPHILS ABSOLUTE: 0 K/UL (ref 0–0.2)
BASOPHILS RELATIVE PERCENT: 0.7 %
BILIRUB SERPL-MCNC: 0.7 MG/DL (ref 0.2–0.7)
BUN BLDV-MCNC: 16 MG/DL (ref 8–23)
CALCIUM SERPL-MCNC: 9.2 MG/DL (ref 8.5–9.9)
CHLORIDE BLD-SCNC: 102 MEQ/L (ref 95–107)
CHOLESTEROL, FASTING: 157 MG/DL (ref 0–199)
CO2: 24 MEQ/L (ref 20–31)
CREAT SERPL-MCNC: 0.94 MG/DL (ref 0.7–1.2)
EOSINOPHILS ABSOLUTE: 0.1 K/UL (ref 0–0.7)
EOSINOPHILS RELATIVE PERCENT: 1.7 %
GFR AFRICAN AMERICAN: >60
GFR NON-AFRICAN AMERICAN: >60
GLOBULIN: 2.9 G/DL (ref 2.3–3.5)
GLUCOSE BLD-MCNC: 121 MG/DL (ref 70–99)
HBA1C MFR BLD: 4.9 % (ref 4.8–5.9)
HCT VFR BLD CALC: 43.2 % (ref 42–52)
HDLC SERPL-MCNC: 45 MG/DL (ref 40–59)
HEMOGLOBIN: 13.8 G/DL (ref 14–18)
LDL CHOLESTEROL CALCULATED: 96 MG/DL (ref 0–129)
LYMPHOCYTES ABSOLUTE: 1.7 K/UL (ref 1–4.8)
LYMPHOCYTES RELATIVE PERCENT: 28.1 %
MCH RBC QN AUTO: 28.6 PG (ref 27–31.3)
MCHC RBC AUTO-ENTMCNC: 32 % (ref 33–37)
MCV RBC AUTO: 89.5 FL (ref 80–100)
MONOCYTES ABSOLUTE: 0.4 K/UL (ref 0.2–0.8)
MONOCYTES RELATIVE PERCENT: 7.1 %
NEUTROPHILS ABSOLUTE: 3.9 K/UL (ref 1.4–6.5)
NEUTROPHILS RELATIVE PERCENT: 62.4 %
PDW BLD-RTO: 12.6 % (ref 11.5–14.5)
PLATELET # BLD: 235 K/UL (ref 130–400)
POTASSIUM SERPL-SCNC: 3.7 MEQ/L (ref 3.4–4.9)
RBC # BLD: 4.83 M/UL (ref 4.7–6.1)
SODIUM BLD-SCNC: 141 MEQ/L (ref 135–144)
TOTAL PROTEIN: 6.9 G/DL (ref 6.3–8)
TRIGLYCERIDE, FASTING: 82 MG/DL (ref 0–150)
WBC # BLD: 6.2 K/UL (ref 4.8–10.8)

## 2019-05-15 LAB
C. TRACHOMATIS DNA ,URINE: NEGATIVE
N. GONORRHOEAE DNA, URINE: NEGATIVE

## 2020-05-04 ENCOUNTER — VIRTUAL VISIT (OUTPATIENT)
Dept: FAMILY MEDICINE CLINIC | Age: 70
End: 2020-05-04
Payer: MEDICARE

## 2020-05-04 PROBLEM — R73.09 ABNORMAL GLUCOSE: Chronic | Status: RESOLVED | Noted: 2019-05-09 | Resolved: 2020-05-04

## 2020-05-04 PROCEDURE — G0438 PPPS, INITIAL VISIT: HCPCS | Performed by: PHYSICIAN ASSISTANT

## 2020-05-04 RX ORDER — LISINOPRIL 20 MG/1
TABLET ORAL
Qty: 30 TABLET | Refills: 11 | Status: SHIPPED | OUTPATIENT
Start: 2020-05-04 | End: 2021-06-10

## 2020-05-04 RX ORDER — TADALAFIL 10 MG/1
TABLET ORAL
Qty: 6 TABLET | Refills: 1 | Status: SHIPPED | OUTPATIENT
Start: 2020-05-04 | End: 2020-11-10 | Stop reason: SDUPTHER

## 2020-05-04 RX ORDER — AMLODIPINE BESYLATE 10 MG/1
TABLET ORAL
Qty: 30 TABLET | Refills: 11 | Status: SHIPPED | OUTPATIENT
Start: 2020-05-04 | End: 2021-05-13

## 2020-05-04 RX ORDER — ASPIRIN 81 MG/1
81 TABLET ORAL DAILY
Qty: 30 TABLET | Refills: 11 | Status: SHIPPED | OUTPATIENT
Start: 2020-05-04

## 2020-05-04 RX ORDER — SIMVASTATIN 20 MG
TABLET ORAL
Qty: 30 TABLET | Refills: 11 | Status: SHIPPED | OUTPATIENT
Start: 2020-05-04 | End: 2021-05-13

## 2020-05-04 ASSESSMENT — PATIENT HEALTH QUESTIONNAIRE - PHQ9
SUM OF ALL RESPONSES TO PHQ QUESTIONS 1-9: 2
SUM OF ALL RESPONSES TO PHQ QUESTIONS 1-9: 2

## 2020-05-04 ASSESSMENT — ENCOUNTER SYMPTOMS
BACK PAIN: 0
BLOOD IN STOOL: 0
CHEST TIGHTNESS: 0
WHEEZING: 0
SHORTNESS OF BREATH: 1
ABDOMINAL DISTENTION: 0
TROUBLE SWALLOWING: 0
DIARRHEA: 0
ABDOMINAL PAIN: 0
PHOTOPHOBIA: 0
CONSTIPATION: 0
SINUS PRESSURE: 0
COUGH: 0
EYE PAIN: 0
COLOR CHANGE: 0
EYE REDNESS: 0
NAUSEA: 0

## 2020-05-04 ASSESSMENT — LIFESTYLE VARIABLES: HOW OFTEN DO YOU HAVE A DRINK CONTAINING ALCOHOL: 0

## 2020-05-04 NOTE — PROGRESS NOTES
2020    TELEHEALTH EVALUATION -- Audio/Visual (During FACWR-69 public health emergency)    Due to COVID 19 outbreak, patient's office visit was converted to a virtual visit. Patient was contacted and agreed to proceed with a virtual visit via Doxy. me  The risks and benefits of converting to a virtual visit were discussed in light of the current infectious disease epidemic. Patient also understood that insurance coverage and co-pays are up to their individual insurance plans. HPI:    Xochitl Lynch (:  1950) has requested an audio/video evaluation for the following concern(s):    Last OV with me: 3/12/2018  Due for routine lab monitoring. HTN. Requesting refills of his BP medications. Complaint with all of his medications. Denies dizziness, CP, RICHARDSON, SOB.       Mixed hyperlipidemia. Taking simvastatin 20 mg tablet daily. Denies myalgia or muscleweakness. Somewhat compliant to a low-saturated fat diet. Obesity. Exercising daily. Was going to the gym; walking in local part for exercise due to covid-pandemic. Glaucoma. Followed by Dr. Abner Montilla and Dr. Racquel Bell at [facility]. Using eye drops; has been compliant with therapy. ED. Asking for refill of cialis. Medication works well. Denies side effects. Review of Systems   Constitutional: Negative for activity change, appetite change, chills, diaphoresis, fatigue, fever and unexpected weight change. HENT: Negative for congestion, hearing loss, mouth sores, nosebleeds, sinus pressure and trouble swallowing. Eyes: Negative for photophobia, pain, redness and visual disturbance. Respiratory: Positive for shortness of breath (with exessive exercise/acitivty). Negative for cough, chest tightness and wheezing. Cardiovascular: Negative for chest pain, palpitations and leg swelling. Gastrointestinal: Negative for abdominal distention, abdominal pain, blood in stool, constipation, diarrhea and nausea.    Genitourinary: Negative for  Diabetes mellitus, type 2 [address] [address] 10/11/2011    Dyslipidemia 10/11/2011    Erectile dysfunction 2012    Essential hypertension, benign 10/11/2011    History of colon polyps     adenomatous polyps   ,   Past Surgical History:   Procedure Laterality Date    [facility]    COLONOSCOPY  11    Dr Héctor Toscano    COLONOSCOPY N/A 2016    COLONOSCOPY performed by Bisi Tavera MD at [place] [address] Right 14    [facility]   ,   Social History     Tobacco Use    Smoking status: Former Smoker     Packs/day: 1.50     Years: 30.00     Pack years: 45.00     Types: Cigarettes     Last attempt to quit: 2000     Years since quittin.3    Smokeless tobacco: Never Used   Substance Use Topics    Alcohol use: No    Drug use: Not on file   , No family history on file.,   Immunization History   Administered Date(s) Administered    Influenza 10/18/2012    Influenza Vaccine, unspecified formulation 2016    Influenza Virus Vaccine 2015    Influenza, Triv, inactivated, subunit, adjuvanted, IM (Fluad 65 yrs and older) 10/13/2018, 08/15/2019    Pneumococcal Conjugate 13-valent (Maged Garcia) 2017, 2019   ,   Health Maintenance   Topic Date Due    AAA screen  1950    Annual Wellness Visit (AWV)  2019    Colon cancer screen colonoscopy  2019    A1C test (Diabetic or Prediabetic)  05/10/2020    Lipid screen  05/10/2020    Potassium monitoring  05/10/2020    Creatinine monitoring  05/10/2020    DTaP/Tdap/Td vaccine (1 - Tdap) 2020 (Originally 1970)    Shingles Vaccine (1 of 2) 2020 (Originally 2001)    Pneumococcal 65+ years Vaccine (2 of 2 - PPSV23) 2020    Flu vaccine  Completed    Hepatitis C screen  Completed    Hepatitis A vaccine  Aged Out    Hepatitis B vaccine  Aged Out    Hib vaccine  Aged Out    Meningococcal (ACWY) vaccine  Aged Out     PHYSICAL EXAMINATION:  [ INSTRUCTIONS:  \"[x]\" Indicates a positive item  \"[]\" Indicates a negative item  -- DELETE ALL ITEMS NOT EXAMINED]  [x] Alert  [x] Oriented to person/place/time    [x] No apparent distress  [] Toxic appearing    [] Face flushed appearing [] Sclera clear  [] Lips are cyanotic      [x] Breathing appears normal  [] Appears tachypneic      [] Rash on visible skin    [x] Cranial Nerves II-XII grossly intact    [x] Motor grossly intact in visible upper extremities    [] Motor grossly intact in visible lower extremities    [x] Normal Mood  [] Anxious appearing    [] Depressed appearing  [] Confused appearing      [] Poor short term memory  [] Poor long term memory    [] OTHER:      Due to this being a TeleHealth encounter, evaluation of the following organ systems is limited: Vitals/Constitutional/EENT/Resp/CV/GI//MS/Neuro/Skin/Heme-Lymph-Imm. ASSESSMENT/PLAN:  1. Routine general medical examination at a health care facility  AWV completed today     2. Essential hypertension, benign    - lisinopril (PRINIVIL;ZESTRIL) 20 MG tablet; take 1 tablet by mouth once daily  Dispense: 30 tablet; Refill: 11  - amLODIPine (NORVASC) 10 MG tablet; take 1 tablet by mouth once daily  Dispense: 30 tablet; Refill: 11  - CBC; Future  - Comprehensive Metabolic Panel; Future    3. Dyslipidemia    - simvastatin (ZOCOR) 20 MG tablet; take 1 tablet by mouth at bedtime  Dispense: 30 tablet; Refill: 11  - Lipid Panel; Future    4. Erectile dysfunction, unspecified erectile dysfunction type    - tadalafil (CIALIS) 10 MG tablet; Take 1 tablet by mouth at least 60 minutes before sexual intercourse once daily as needed for Erectile Dysfunction  Dispense: 6 tablet; Refill: 1    5. Morbid obesity with BMI of 40.0-44.9, adult ([facility] [address]    Return in 6 months (on 11/4/2020) for Medicare Annual Wellness Visit in 1 year, follow up. An  electronic signature was used to authenticate this note.     --Cherelle Constantino PA-C on 5/4/2020 at 10:52 AM        Pursuant 0.005 % ophthalmic solution Use 1 Drop in both eyes daily at bedtime. Yes Historical Provider, MD   dorzolamide-timolol (COSOPT) 22.3-6.8 MG/ML ophthalmic solution Use 1 Drop in both eyes twice daily. Yes Historical Provider, MD         Past Medical History:   Diagnosis Date    Diabetes mellitus, type 2 ([facility] [address] 10/11/2011    Dyslipidemia 10/11/2011    Erectile dysfunction 7/19/2012    Essential hypertension, benign 10/11/2011    History of colon polyps     adenomatous polyps       Past Surgical History:   Procedure Laterality Date    [facility]    COLONOSCOPY  9/26/11    Dr Maggy Mar    COLONOSCOPY N/A 11/11/2016    COLONOSCOPY performed by Kenn Frias MD at [place] [address] Right 5/1/14    [facility]     No family history on file. CareTeam (Including outside providers/suppliers regularly involved in providing care):   Patient Care Team:  Adriane Asher PA-C as PCP - General (Family Medicine)  Adriane Asher PA-C as PCP - [facility] Empaneled Provider    Wt Readings from Last 3 Encounters:   05/09/19 264 lb (119.7 kg)   03/12/18 259 lb 9.6 oz (117.8 kg)   08/18/17 253 lb 12.8 oz (115.1 kg)     There were no vitals filed for this visit. There is no height or weight on file to calculate BMI. Based upon direct observation of the patient, evaluation of cognition reveals recent and remote memory intact. Patient's complete Health Risk Assessment and screening values have been reviewed and are found in Flowsheets. The following problems were reviewed today and where indicated follow up appointments were made and/or referrals ordered. Positive Risk Factor Screenings with Interventions:     General Health:  General  In general, how would you say your health is?: Very Good  In the past 7 days, have you experienced any of the following?  New or Increased Pain, New or Increased Fatigue, Loneliness, Social Isolation, Stress or Anger?: None of These  Do you get the social and emotional support that you need?: Yes  Do you have a Living Will?: (!) No  General Health Risk Interventions:  · No Living Will: upon chart review has one on file from 2016    Health Habits/Nutrition:  Health Habits/Nutrition  Do you exercise for at least 20 minutes 2-3 times per week?: Yes  Have you lost any weight without trying in the past 3 months?: No  Do you eat fewer than 2 meals per day?: No  Have you seen a dentist within the past year?: (!) No  There is no height or weight on file to calculate BMI. Health Habits/Nutrition Interventions:  · Dental exam overdue:  patient declines dental evaluation    Personalized Preventive Plan   Current Health Maintenance Status  Immunization History   Administered Date(s) Administered    Influenza 10/18/2012    Influenza Vaccine, unspecified formulation 12/28/2016    Influenza Virus Vaccine 09/17/2015    Influenza, Triv, inactivated, subunit, adjuvanted, IM (Fluad 65 yrs and older) 10/13/2018, 08/15/2019    Pneumococcal Conjugate 13-valent ([place]is [place]) 01/06/2017, 05/09/2019       Health Maintenance   Topic Date Due    AAA screen  1950    Annual Wellness Visit (AWV)  05/29/2019    Colon cancer screen colonoscopy  11/11/2019    A1C test (Diabetic or Prediabetic)  05/10/2020    Lipid screen  05/10/2020    Potassium monitoring  05/10/2020    Creatinine monitoring  05/10/2020    DTaP/Tdap/Td vaccine (1 - Tdap) 05/09/2020 (Originally 2/18/1970)    Shingles Vaccine (1 of 2) 05/09/2020 (Originally 2/18/2001)    Pneumococcal 65+ years Vaccine (2 of 2 - PPSV23) 05/09/2020    Flu vaccine  Completed    Hepatitis C screen  Completed    Hepatitis A vaccine  Aged Out    Hepatitis B vaccine  Aged Out    Hib vaccine  Aged Out    Meningococcal (ACWY) vaccine  Aged Out     Recommendations for TabbedOut Due: see orders and patient instructions/AVS.  .   Recommended screening schedule for the next 5-10 years is provided to the patient in written form: see Patient Carmine Bull was seen today for medicare awv and hypertension. Diagnoses and all orders for this visit:    Routine general medical examination at a health care facility    Essential hypertension, benign  -     lisinopril (PRINIVIL;ZESTRIL) 20 MG tablet; take 1 tablet by mouth once daily  -     amLODIPine (NORVASC) 10 MG tablet; take 1 tablet by mouth once daily  -     CBC; Future  -     Comprehensive Metabolic Panel; Future    Dyslipidemia  -     simvastatin (ZOCOR) 20 MG tablet; take 1 tablet by mouth at bedtime  -     Lipid Panel; Future    Erectile dysfunction, unspecified erectile dysfunction type  -     tadalafil (CIALIS) 10 MG tablet; Take 1 tablet by mouth at least 60 minutes before sexual intercourse once daily as needed for Erectile Dysfunction    Morbid obesity with BMI of 40.0-44.9, adult ([address]    Other orders  -     aspirin 81 MG EC tablet;  Take 1 tablet by mouth daily

## 2020-05-04 NOTE — PATIENT INSTRUCTIONS
Personalized Preventive Plan for Pat Michael - 5/4/2020  Medicare offers a range of preventive health benefits. Some of the tests and screenings are paid in full while other may be subject to a deductible, co-insurance, and/or copay. Some of these benefits include a comprehensive review of your medical history including lifestyle, illnesses that may run in your family, and various assessments and screenings as appropriate. After reviewing your medical record and screening and assessments performed today your provider may have ordered immunizations, labs, imaging, and/or referrals for you. A list of these orders (if applicable) as well as your Preventive Care list are included within your After Visit Summary for your review. Other Preventive Recommendations:    · A preventive eye exam performed by an eye specialist is recommended every 1-2 years to screen for glaucoma; cataracts, macular degeneration, and other eye disorders. · A preventive dental visit is recommended every 6 months. · Try to get at least 150 minutes of exercise per week or 10,000 steps per day on a pedometer . · Order or download the FREE \"Exercise & Physical Activity: Your Everyday Guide\" from [employer]. Call 4-198.947.6987 or search [employer] online. · You need 0187-8107 mg of calcium and 1529-4228 IU of vitamin D per day. It is possible to meet your calcium requirement with diet alone, but a vitamin D supplement is usually necessary to meet this goal.  · When exposed to the sun, use a sunscreen that protects against both UVA and UVB radiation with an SPF of 30 or greater. Reapply every 2 to 3 hours or after sweating, drying off with a towel, or swimming. · Always wear a seat belt when traveling in a car. Always wear a helmet when riding a bicycle or motorcycle.

## 2020-07-10 ENCOUNTER — VIRTUAL VISIT (OUTPATIENT)
Dept: FAMILY MEDICINE CLINIC | Age: 70
End: 2020-07-10
Payer: MEDICARE

## 2020-07-10 DIAGNOSIS — E66.01 MORBID OBESITY WITH BMI OF 40.0-44.9, ADULT (HCC): ICD-10-CM

## 2020-07-10 DIAGNOSIS — I10 ESSENTIAL HYPERTENSION, BENIGN: ICD-10-CM

## 2020-07-10 DIAGNOSIS — Z20.822 CLOSE EXPOSURE TO COVID-19 VIRUS: ICD-10-CM

## 2020-07-10 LAB
ALBUMIN SERPL-MCNC: 4 G/DL (ref 3.5–4.6)
ALP BLD-CCNC: 67 U/L (ref 35–104)
ALT SERPL-CCNC: 16 U/L (ref 0–41)
ANION GAP SERPL CALCULATED.3IONS-SCNC: 9 MEQ/L (ref 9–15)
AST SERPL-CCNC: 16 U/L (ref 0–40)
BASOPHILS ABSOLUTE: 0.1 K/UL (ref 0–0.2)
BASOPHILS RELATIVE PERCENT: 0.5 %
BILIRUB SERPL-MCNC: 0.6 MG/DL (ref 0.2–0.7)
BUN BLDV-MCNC: 10 MG/DL (ref 8–23)
CALCIUM SERPL-MCNC: 9.9 MG/DL (ref 8.5–9.9)
CHLORIDE BLD-SCNC: 104 MEQ/L (ref 95–107)
CHOLESTEROL, TOTAL: 161 MG/DL (ref 0–199)
CO2: 28 MEQ/L (ref 20–31)
CREAT SERPL-MCNC: 0.8 MG/DL (ref 0.7–1.2)
EOSINOPHILS ABSOLUTE: 0.1 K/UL (ref 0–0.7)
EOSINOPHILS RELATIVE PERCENT: 0.5 %
GFR AFRICAN AMERICAN: >60
GFR NON-AFRICAN AMERICAN: >60
GLOBULIN: 2.9 G/DL (ref 2.3–3.5)
GLUCOSE BLD-MCNC: 112 MG/DL (ref 70–99)
HCT VFR BLD CALC: 39.9 % (ref 42–52)
HDLC SERPL-MCNC: 48 MG/DL (ref 40–59)
HEMOGLOBIN: 12.8 G/DL (ref 14–18)
LDL CHOLESTEROL CALCULATED: 90 MG/DL (ref 0–129)
LYMPHOCYTES ABSOLUTE: 1.5 K/UL (ref 1–4.8)
LYMPHOCYTES RELATIVE PERCENT: 13.7 %
MCH RBC QN AUTO: 28.9 PG (ref 27–31.3)
MCHC RBC AUTO-ENTMCNC: 32 % (ref 33–37)
MCV RBC AUTO: 90.3 FL (ref 80–100)
MONOCYTES ABSOLUTE: 0.8 K/UL (ref 0.2–0.8)
MONOCYTES RELATIVE PERCENT: 6.7 %
NEUTROPHILS ABSOLUTE: 8.8 K/UL (ref 1.4–6.5)
NEUTROPHILS RELATIVE PERCENT: 78.6 %
PDW BLD-RTO: 12.6 % (ref 11.5–14.5)
PLATELET # BLD: 245 K/UL (ref 130–400)
POTASSIUM SERPL-SCNC: 3.7 MEQ/L (ref 3.4–4.9)
RBC # BLD: 4.42 M/UL (ref 4.7–6.1)
SODIUM BLD-SCNC: 141 MEQ/L (ref 135–144)
TOTAL PROTEIN: 6.9 G/DL (ref 6.3–8)
TRIGL SERPL-MCNC: 115 MG/DL (ref 0–150)
WBC # BLD: 11.2 K/UL (ref 4.8–10.8)

## 2020-07-10 PROCEDURE — 99214 OFFICE O/P EST MOD 30 MIN: CPT | Performed by: PHYSICIAN ASSISTANT

## 2020-07-10 ASSESSMENT — ENCOUNTER SYMPTOMS
CHEST TIGHTNESS: 0
WHEEZING: 0
CONSTIPATION: 0
DIARRHEA: 0
BLOOD IN STOOL: 0
ABDOMINAL DISTENTION: 0
ABDOMINAL PAIN: 0
TROUBLE SWALLOWING: 0
SHORTNESS OF BREATH: 0
COUGH: 0
BACK PAIN: 0
NAUSEA: 0
SINUS PRESSURE: 0
EYE REDNESS: 0
COLOR CHANGE: 0
PHOTOPHOBIA: 0
EYE PAIN: 0

## 2020-07-10 ASSESSMENT — PATIENT HEALTH QUESTIONNAIRE - PHQ9
SUM OF ALL RESPONSES TO PHQ QUESTIONS 1-9: 0
1. LITTLE INTEREST OR PLEASURE IN DOING THINGS: 0
SUM OF ALL RESPONSES TO PHQ9 QUESTIONS 1 & 2: 0
SUM OF ALL RESPONSES TO PHQ QUESTIONS 1-9: 0
2. FEELING DOWN, DEPRESSED OR HOPELESS: 0

## 2020-07-10 NOTE — PROGRESS NOTES
7/10/2020    TELEHEALTH EVALUATION -- Audio/Visual (During AOYGU-64 public health emergency)    Due to [place] 19 outbreak, patient's office visit was converted to a virtual visit. Patient was contacted and agreed to proceed with a virtual visit via Doxy. me  The risks and benefits of converting to a virtual visit were discussed in light of the current infectious disease epidemic. Patient also understood that insurance coverage and co-pays are up to their individual insurance plans. HPI:    Britt Wilson (:  1950) has requested an audio/video evaluation for the following concern(s):    Last OV with me: 2020 via VV    Health maintenance:   Due for colon ca screening. Last colonoscopy was --polyps were removed, +diverticulosis. Due for routine labs. COVID Exposure. Reports that he was exposed possibly to covid in February. Came down with very similar symptoms. Would like Ab testing today, if possible, for peace of mind. At this time, he does not have cough, SOB, chest tightness, fatigue, fever, diarrhea. He is compliant with his chronic medications. Reviewed his current medical history. Review of Systems   Constitutional: Negative for activity change, appetite change, chills, diaphoresis, fatigue, fever and unexpected weight change. HENT: Negative for congestion, hearing loss, mouth sores, nosebleeds, sinus pressure and trouble swallowing. Eyes: Negative for photophobia, pain, redness and visual disturbance. Respiratory: Negative for cough, chest tightness, shortness of breath and wheezing. Cardiovascular: Negative for chest pain, palpitations and leg swelling. Gastrointestinal: Negative for abdominal distention, abdominal pain, blood in stool, constipation, diarrhea and nausea. Genitourinary: Negative for discharge, dysuria, flank pain, frequency, genital sores and urgency. Musculoskeletal: Negative for arthralgias, back pain and gait problem.    Skin: Negative for color change and rash. Neurological: Negative for dizziness, weakness, light-headedness and headaches. Psychiatric/Behavioral: Negative for agitation, confusion, dysphoric mood and sleep disturbance. The patient is not nervous/anxious. Prior to Visit Medications    Medication Sig Taking? Authorizing Provider   hydrochlorothiazide (HYDRODIURIL) 12.5 MG tablet take 1 tablet by mouth once daily  Nandini Cao PA-C   lisinopril (PRINIVIL;ZESTRIL) 20 MG tablet take 1 tablet by mouth once daily  Nandini Cao PA-C   simvastatin (ZOCOR) 20 MG tablet take 1 tablet by mouth at bedtime  Nandini Cao PA-C   amLODIPine (NORVASC) 10 MG tablet take 1 tablet by mouth once daily  Nandini Cao PA-C   aspirin 81 MG EC tablet Take 1 tablet by mouth daily  Nandini Cao PA-C   tadalafil (CIALIS) 10 MG tablet Take 1 tablet by mouth at least 60 minutes before sexual intercourse once daily as needed for Erectile Dysfunction  Nandini Cao PA-C   latanoprost (XALATAN) 0.005 % ophthalmic solution Use 1 Drop in both eyes daily at bedtime. Historical Provider, MD   dorzolamide-timolol (COSOPT) 22.3-6.8 MG/ML ophthalmic solution Use 1 Drop in both eyes twice daily. Historical Provider, MD       Social History     Tobacco Use    Smoking status: Former Smoker     Packs/day: 1.50     Years: 30.00     Pack years: 45.00     Types: Cigarettes     Last attempt to quit: 2000     Years since quittin.5    Smokeless tobacco: Never Used   Substance Use Topics    Alcohol use: No    Drug use: Not on file       Allergies   Allergen Reactions    Apraclonidine      Other reaction(s): Other: See Comments  Irritation, not tolerable     Brimonidine      Other reaction(s):  Other: See Comments  Follicles    ,   Past Medical History:   Diagnosis Date    Diabetes mellitus, type 2 ([address] 10/11/2011    Dyslipidemia 10/11/2011    Erectile dysfunction 2012    Essential hypertension, benign 10/11/2011    History of colon polyps adenomatous polyps   ,   Past Surgical History:   Procedure Laterality Date    [facility]    COLONOSCOPY  11    Dr Yoel Jesus    COLONOSCOPY N/A 2016    COLONOSCOPY performed by Louis Cotto MD at [place] [address] Right 14    [facility]   ,   Social History     Tobacco Use    Smoking status: Former Smoker     Packs/day: 1.50     Years: 30.00     Pack years: 45.00     Types: Cigarettes     Last attempt to quit: 2000     Years since quittin.5    Smokeless tobacco: Never Used   Substance Use Topics    Alcohol use: No    Drug use: Not on file   , No family history on file.,   Immunization History   Administered Date(s) Administered    Influenza 10/18/2012    Influenza Vaccine, unspecified formulation 2016    Influenza Virus Vaccine 2015    Influenza, Triv, inactivated, subunit, adjuvanted, IM (Fluad 65 yrs and older) 10/13/2018, 08/15/2019    Pneumococcal Conjugate 13-valent ([place]) 2017, 2019   ,   Health Maintenance   Topic Date Due    AAA screen  1950    DTaP/Tdap/Td vaccine (1 - Tdap) 1970    Shingles Vaccine (1 of 2) 2001    Colon cancer screen colonoscopy  2019    Pneumococcal 65+ years Vaccine (2 of 2 - PPSV23) 2020    Lipid screen  05/10/2020    Potassium monitoring  05/10/2020    Creatinine monitoring  05/10/2020    Flu vaccine (1) 2020    Annual Wellness Visit (AWV)  2021    Hepatitis C screen  Completed    Hepatitis A vaccine  Aged Out    Hepatitis B vaccine  Aged Out    Hib vaccine  Aged Out    Meningococcal (ACWY) vaccine  Aged Out       PHYSICAL EXAMINATION:  [ INSTRUCTIONS:  \"[x]\" Indicates a positive item  \"[]\" Indicates a negative item  -- DELETE ALL ITEMS NOT EXAMINED]  [x] Alert  [x] Oriented to person/place/time    [x] No apparent distress  [] Toxic appearing    [] Face flushed appearing [x] Sclera clear  [] Lips are cyanotic      [x]

## 2020-07-12 LAB — SARS-COV-2, IGG: NEGATIVE

## 2020-09-01 ENCOUNTER — TELEPHONE (OUTPATIENT)
Dept: FAMILY MEDICINE CLINIC | Age: 70
End: 2020-09-01

## 2020-09-02 DIAGNOSIS — Z11.4 ENCOUNTER FOR SCREENING FOR HIV: ICD-10-CM

## 2020-09-04 LAB — HIV 1,2 COMBO ANTIGEN/ANTIBODY: NEGATIVE

## 2020-11-10 ENCOUNTER — OFFICE VISIT (OUTPATIENT)
Dept: FAMILY MEDICINE CLINIC | Age: 70
End: 2020-11-10
Payer: MEDICARE

## 2020-11-10 VITALS
SYSTOLIC BLOOD PRESSURE: 139 MMHG | BODY MASS INDEX: 41.75 KG/M2 | HEIGHT: 67 IN | DIASTOLIC BLOOD PRESSURE: 70 MMHG | TEMPERATURE: 98.3 F | WEIGHT: 266 LBS | HEART RATE: 65 BPM | RESPIRATION RATE: 16 BRPM | OXYGEN SATURATION: 98 %

## 2020-11-10 DIAGNOSIS — R79.89 ABNORMAL CBC: ICD-10-CM

## 2020-11-10 PROBLEM — H61.21 IMPACTED CERUMEN, RIGHT EAR: Status: ACTIVE | Noted: 2020-11-10

## 2020-11-10 PROBLEM — Z20.822 CLOSE EXPOSURE TO COVID-19 VIRUS: Status: RESOLVED | Noted: 2020-07-10 | Resolved: 2020-11-10

## 2020-11-10 LAB
BASOPHILS ABSOLUTE: 0.1 K/UL (ref 0–0.2)
BASOPHILS RELATIVE PERCENT: 0.8 %
EOSINOPHILS ABSOLUTE: 0.2 K/UL (ref 0–0.7)
EOSINOPHILS RELATIVE PERCENT: 2.2 %
HCT VFR BLD CALC: 44 % (ref 42–52)
HEMOGLOBIN: 14.1 G/DL (ref 14–18)
LYMPHOCYTES ABSOLUTE: 2.3 K/UL (ref 1–4.8)
LYMPHOCYTES RELATIVE PERCENT: 32.5 %
MCH RBC QN AUTO: 28.8 PG (ref 27–31.3)
MCHC RBC AUTO-ENTMCNC: 32 % (ref 33–37)
MCV RBC AUTO: 90 FL (ref 80–100)
MONOCYTES ABSOLUTE: 0.5 K/UL (ref 0.2–0.8)
MONOCYTES RELATIVE PERCENT: 7.1 %
NEUTROPHILS ABSOLUTE: 4 K/UL (ref 1.4–6.5)
NEUTROPHILS RELATIVE PERCENT: 57.4 %
PDW BLD-RTO: 12.9 % (ref 11.5–14.5)
PLATELET # BLD: 269 K/UL (ref 130–400)
RBC # BLD: 4.88 M/UL (ref 4.7–6.1)
WBC # BLD: 7 K/UL (ref 4.8–10.8)

## 2020-11-10 PROCEDURE — 69210 REMOVE IMPACTED EAR WAX UNI: CPT | Performed by: PHYSICIAN ASSISTANT

## 2020-11-10 PROCEDURE — 90688 IIV4 VACCINE SPLT 0.5 ML IM: CPT | Performed by: PHYSICIAN ASSISTANT

## 2020-11-10 PROCEDURE — G0008 ADMIN INFLUENZA VIRUS VAC: HCPCS | Performed by: PHYSICIAN ASSISTANT

## 2020-11-10 PROCEDURE — 99214 OFFICE O/P EST MOD 30 MIN: CPT | Performed by: PHYSICIAN ASSISTANT

## 2020-11-10 RX ORDER — TADALAFIL 20 MG/1
TABLET ORAL
Qty: 10 TABLET | Refills: 2 | Status: SHIPPED | OUTPATIENT
Start: 2020-11-10 | End: 2021-03-12

## 2020-11-10 ASSESSMENT — ENCOUNTER SYMPTOMS
COLOR CHANGE: 0
SHORTNESS OF BREATH: 0
WHEEZING: 0
BLOOD IN STOOL: 0
COUGH: 0
EYE PAIN: 0
SINUS PRESSURE: 0
PHOTOPHOBIA: 0
ABDOMINAL PAIN: 0
CONSTIPATION: 0
BACK PAIN: 0
NAUSEA: 0
EYE REDNESS: 0
DIARRHEA: 0
ABDOMINAL DISTENTION: 0
CHEST TIGHTNESS: 0
TROUBLE SWALLOWING: 0

## 2020-11-10 NOTE — PROGRESS NOTES
Subjective  Henry Zhang, 72 y.o. male presents today with:  Chief Complaint   Patient presents with    Hypertension     3 month follow up      HPI  Rodrigo Moreau is in the office today for follow up. Last OV With me: 7/10/2020 via VV. HTN.   Complaint with all of his medications.  Denies dizziness, CP, RICHARDSON, SOB.       Mixed hyperlipidemia.  Taking simvastatin 20 mg tablet daily.  Denies myalgia or muscleweakness. Trying to exercise (cardio).      Obesity. Exercising daily. Trying to lose weight.      Glaucoma. Followed by Dr. Annabel Acosta and Dr. Nimisha Bobo at [facility]. Using eye drops; has been compliant with therapy.      ED. Asking for refill of cialis. Medication works well. Denies side effects. Would like dose increase if possible. Currently taking 10 mg, PRN. R ear fullness. Having some intermittent R ear fullness with diminished hearing. No dizziness. Low CBC. Noted to have low CBC on most recent blood work. Eating good sources of protein. Due for repeat colonoscopy--last was 2016. Was told to have repeat in 3 years due to polyps. Denies melena or hematochezia. Results for orders placed or performed in visit on 09/02/20   HIV-1,2 Combo Ag/Ab By VIC, Reflexive Panel   Result Value Ref Range    HIV 1,2 Combo Antigen/Antibody Negative Negative       Review of Systems   Constitutional: Negative for activity change, appetite change, chills, diaphoresis, fatigue, fever and unexpected weight change. HENT: Positive for ear discharge (R ear fullness, drainage). Negative for congestion, hearing loss, mouth sores, nosebleeds, sinus pressure and trouble swallowing. Eyes: Negative for photophobia, pain, redness and visual disturbance. Respiratory: Negative for cough, chest tightness, shortness of breath and wheezing. Cardiovascular: Negative for chest pain, palpitations and leg swelling.    Gastrointestinal: Negative for abdominal distention, abdominal pain, blood in stool, constipation, diarrhea and nausea. Genitourinary: Negative for discharge, dysuria, flank pain, frequency, genital sores and urgency. Musculoskeletal: Negative for arthralgias, back pain and gait problem. Skin: Negative for color change and rash. Neurological: Negative for dizziness, weakness, light-headedness and headaches. Psychiatric/Behavioral: Negative for agitation, confusion, dysphoric mood and sleep disturbance. The patient is not nervous/anxious.       Past Medical History:   Diagnosis Date    Diabetes mellitus, type 2 ([address] 10/11/2011    Dyslipidemia 10/11/2011    Erectile dysfunction 2012    Essential hypertension, benign 10/11/2011    History of colon polyps     adenomatous polyps     Past Surgical History:   Procedure Laterality Date    [facility]    COLONOSCOPY  11    Dr Wendy Brito    COLONOSCOPY N/A 2016    COLONOSCOPY performed by Carly Wan MD at [place] [address] Right 14    [facility]     Social History     Socioeconomic History    Marital status: Single     Spouse name: Not on file    Number of children: Not on file    Years of education: Not on file    Highest education level: Not on file   Occupational History    Not on file   Social Needs    Financial resource strain: Not on file    Food insecurity     Worry: Not on file     Inability: Not on file   [employer] needs     Medical: Not on file     Non-medical: Not on file   Tobacco Use    Smoking status: Former Smoker     Packs/day: 1.50     Years: 30.00     Pack years: 45.00     Types: Cigarettes     Last attempt to quit: 2000     Years since quittin.8    Smokeless tobacco: Never Used   Substance and Sexual Activity    Alcohol use: No    Drug use: Not on file    Sexual activity: Not on file   Lifestyle    Physical activity     Days per week: Not on file     Minutes per session: Not on file    Stress: Not on file   Relationships    Social connections Talks on phone: Not on file     Gets together: Not on file     Attends Voodoo service: Not on file     Active member of club or organization: Not on file     Attends meetings of clubs or organizations: Not on file     Relationship status: Not on file    Intimate partner violence     Fear of current or ex partner: Not on file     Emotionally abused: Not on file     Physically abused: Not on file     Forced sexual activity: Not on file   Other Topics Concern    Not on file   Social History Narrative    Not on file     History reviewed. No pertinent family history. Allergies   Allergen Reactions    Apraclonidine      Other reaction(s): Other: See Comments  Irritation, not tolerable     Brimonidine      Other reaction(s): Other: See Comments  Follicles      Current Outpatient Medications   Medication Sig Dispense Refill    tadalafil (CIALIS) 20 MG tablet Take 1 tablet by mouth at least 60 minutes before sexual intercourse once daily as needed for Erectile Dysfunction 10 tablet 2    hydrochlorothiazide (HYDRODIURIL) 12.5 MG tablet take 1 tablet by mouth once daily 90 tablet 1    lisinopril (PRINIVIL;ZESTRIL) 20 MG tablet take 1 tablet by mouth once daily 30 tablet 11    simvastatin (ZOCOR) 20 MG tablet take 1 tablet by mouth at bedtime 30 tablet 11    amLODIPine (NORVASC) 10 MG tablet take 1 tablet by mouth once daily 30 tablet 11    aspirin 81 MG EC tablet Take 1 tablet by mouth daily 30 tablet 11    latanoprost (XALATAN) 0.005 % ophthalmic solution Use 1 Drop in both eyes daily at bedtime.  dorzolamide-timolol (COSOPT) 22.3-6.8 MG/ML ophthalmic solution Use 1 Drop in both eyes twice daily. No current facility-administered medications for this visit. PMH, Surgical Hx, Family Hx, and Social Hx reviewed and updated. Health Maintenance reviewed.     Objective  Vitals:    11/10/20 1013   BP: 139/70   Site: Left Upper Arm   Position: Sitting   Cuff Size: Large Adult   Pulse: 65   Resp: 16 Speech normal.         Behavior: Behavior normal.         Thought Content: Thought content normal.         Cognition and Memory: Cognition normal.         Judgment: Judgment normal.      Comments: Retired from [employer]. Family lives in [place]. Likes living in [place]. Assessment & Plan   Trey Badillo was seen today for hypertension. Diagnoses and all orders for this visit:    Morbid obesity with BMI of 40.0-44.9, adult ([address]    Essential hypertension, benign    Erectile dysfunction, unspecified erectile dysfunction type  -     tadalafil (CIALIS) 20 MG tablet; Take 1 tablet by mouth at least 60 minutes before sexual intercourse once daily as needed for Erectile Dysfunction    Abnormal CBC  -     CBC Auto Differential; Future    Impacted cerumen, right ear  -     ND REMOVAL IMPACTED CERUMEN INSTRUMENTATION UNILAT    Flu vaccine need  -     INFLUENZA, QUADV, 3 YRS AND OLDER, IM, MDV, 0.5ML (AFLURIA QUADV)    Colon cancer screening  -     Ambulatory referral to Gastroenterology    Dyslipidemia    Overall, doing well. Trial increased cialis   Repeat CBC. OK for colonoscopy. 6 month follow up with me, return sooner with any questions or concerns.        Orders Placed This Encounter   Procedures    INFLUENZA, QUADV, 3 YRS AND OLDER, IM, MDV, 0.5ML (AFLURIA QUADV)    CBC Auto Differential     Standing Status:   Future     Number of Occurrences:   1     Standing Expiration Date:   11/10/2021    Ambulatory referral to Gastroenterology     Referral Priority:   Routine     Referral Type:   Eval and Treat     Referral Reason:   Specialty Services Required     Requested Specialty:   Gastroenterology     Number of Visits Requested:   1    ND REMOVAL IMPACTED CERUMEN INSTRUMENTATION UNILAT     Orders Placed This Encounter   Medications    tadalafil (CIALIS) 20 MG tablet     Sig: Take 1 tablet by mouth at least 60 minutes before sexual intercourse once daily as needed for Erectile Dysfunction     Dispense:  10 tablet     Refill: 2     Medications Discontinued During This Encounter   Medication Reason    tadalafil (CIALIS) 10 MG tablet REORDER     No follow-ups on file. Reviewed with the patient: current clinical status, medications, activities and diet. Side effects, adverse effects of the medication prescribed today, as well as treatment plan/ rationale and result expectations have been discussed with the patient who expresses understanding and desires to proceed. Close follow up to evaluate treatment results and for coordination of care. I have reviewed the patient's medical history in detail and updated the computerized patient record.     Nandini Cao PA-C

## 2020-11-23 ENCOUNTER — TELEPHONE (OUTPATIENT)
Dept: ENDOSCOPY | Age: 70
End: 2020-11-23

## 2020-11-23 NOTE — TELEPHONE ENCOUNTER
called in prescription for Trilyte bowel prep kit to [facility] on [address] 11/23/2020 at 8:30am

## 2020-11-30 ENCOUNTER — NURSE ONLY (OUTPATIENT)
Dept: PRIMARY CARE CLINIC | Age: 70
End: 2020-11-30

## 2020-12-01 LAB
SARS-COV-2: NOT DETECTED
SOURCE: NORMAL

## 2020-12-07 ENCOUNTER — ANESTHESIA EVENT (OUTPATIENT)
Dept: ENDOSCOPY | Age: 70
End: 2020-12-07
Payer: MEDICARE

## 2020-12-07 ENCOUNTER — HOSPITAL ENCOUNTER (OUTPATIENT)
Age: 70
Setting detail: OUTPATIENT SURGERY
Discharge: HOME | End: 2020-12-07
Attending: SPECIALIST | Admitting: SPECIALIST
Payer: MEDICARE

## 2020-12-07 ENCOUNTER — ANESTHESIA (OUTPATIENT)
Dept: ENDOSCOPY | Age: 70
End: 2020-12-07
Payer: MEDICARE

## 2020-12-07 ENCOUNTER — ANCILLARY PROCEDURE (OUTPATIENT)
Dept: ENDOSCOPY | Age: 70
End: 2020-12-07
Attending: SPECIALIST
Payer: MEDICARE

## 2020-12-07 VITALS
HEIGHT: 67 IN | OXYGEN SATURATION: 100 % | SYSTOLIC BLOOD PRESSURE: 144 MMHG | BODY MASS INDEX: 40.81 KG/M2 | RESPIRATION RATE: 18 BRPM | TEMPERATURE: 96.9 F | DIASTOLIC BLOOD PRESSURE: 91 MMHG | HEART RATE: 98 BPM | WEIGHT: 260 LBS

## 2020-12-07 VITALS
SYSTOLIC BLOOD PRESSURE: 128 MMHG | DIASTOLIC BLOOD PRESSURE: 73 MMHG | OXYGEN SATURATION: 100 % | RESPIRATION RATE: 12 BRPM

## 2020-12-07 PROCEDURE — 2580000003 HC RX 258: Performed by: NURSE ANESTHETIST, CERTIFIED REGISTERED

## 2020-12-07 PROCEDURE — 3700000001 HC ADD 15 MINUTES (ANESTHESIA): Performed by: SPECIALIST

## 2020-12-07 PROCEDURE — 3609027000 HC COLONOSCOPY: Performed by: SPECIALIST

## 2020-12-07 PROCEDURE — 2500000003 HC RX 250 WO HCPCS: Performed by: NURSE ANESTHETIST, CERTIFIED REGISTERED

## 2020-12-07 PROCEDURE — 2580000003 HC RX 258: Performed by: SPECIALIST

## 2020-12-07 PROCEDURE — 45385 COLONOSCOPY W/LESION REMOVAL: CPT | Performed by: SPECIALIST

## 2020-12-07 PROCEDURE — 7100000011 HC PHASE II RECOVERY - ADDTL 15 MIN: Performed by: SPECIALIST

## 2020-12-07 PROCEDURE — 6360000002 HC RX W HCPCS: Performed by: NURSE ANESTHETIST, CERTIFIED REGISTERED

## 2020-12-07 PROCEDURE — 2709999900 HC NON-CHARGEABLE SUPPLY: Performed by: SPECIALIST

## 2020-12-07 PROCEDURE — 6370000000 HC RX 637 (ALT 250 FOR IP): Performed by: SPECIALIST

## 2020-12-07 PROCEDURE — 7100000010 HC PHASE II RECOVERY - FIRST 15 MIN: Performed by: SPECIALIST

## 2020-12-07 PROCEDURE — 88305 TISSUE EXAM BY PATHOLOGIST: CPT

## 2020-12-07 PROCEDURE — 2580000003 HC RX 258

## 2020-12-07 PROCEDURE — 3700000000 HC ANESTHESIA ATTENDED CARE: Performed by: SPECIALIST

## 2020-12-07 RX ORDER — GLYCOPYRROLATE 1 MG/5 ML
SYRINGE (ML) INTRAVENOUS PRN
Status: DISCONTINUED | OUTPATIENT
Start: 2020-12-07 | End: 2020-12-07 | Stop reason: SDUPTHER

## 2020-12-07 RX ORDER — LIDOCAINE HYDROCHLORIDE 20 MG/ML
INJECTION, SOLUTION EPIDURAL; INFILTRATION; INTRACAUDAL; PERINEURAL PRN
Status: DISCONTINUED | OUTPATIENT
Start: 2020-12-07 | End: 2020-12-07 | Stop reason: SDUPTHER

## 2020-12-07 RX ORDER — PROPOFOL 10 MG/ML
INJECTION, EMULSION INTRAVENOUS PRN
Status: DISCONTINUED | OUTPATIENT
Start: 2020-12-07 | End: 2020-12-07 | Stop reason: SDUPTHER

## 2020-12-07 RX ORDER — SIMETHICONE 20 MG/.3ML
EMULSION ORAL PRN
Status: DISCONTINUED | OUTPATIENT
Start: 2020-12-07 | End: 2020-12-07 | Stop reason: ALTCHOICE

## 2020-12-07 RX ORDER — SODIUM CHLORIDE 9 MG/ML
INJECTION, SOLUTION INTRAVENOUS
Status: COMPLETED
Start: 2020-12-07 | End: 2020-12-07

## 2020-12-07 RX ORDER — ONDANSETRON 2 MG/ML
4 INJECTION INTRAMUSCULAR; INTRAVENOUS
Status: DISCONTINUED | OUTPATIENT
Start: 2020-12-07 | End: 2020-12-07 | Stop reason: HOSPADM

## 2020-12-07 RX ORDER — SODIUM CHLORIDE 9 MG/ML
INJECTION, SOLUTION INTRAVENOUS CONTINUOUS PRN
Status: DISCONTINUED | OUTPATIENT
Start: 2020-12-07 | End: 2020-12-07 | Stop reason: SDUPTHER

## 2020-12-07 RX ORDER — MAGNESIUM HYDROXIDE 1200 MG/15ML
LIQUID ORAL PRN
Status: DISCONTINUED | OUTPATIENT
Start: 2020-12-07 | End: 2020-12-07 | Stop reason: ALTCHOICE

## 2020-12-07 RX ORDER — SODIUM CHLORIDE 9 MG/ML
INJECTION, SOLUTION INTRAVENOUS ONCE
Status: COMPLETED | OUTPATIENT
Start: 2020-12-07 | End: 2020-12-07

## 2020-12-07 RX ADMIN — SODIUM CHLORIDE: 9 INJECTION, SOLUTION INTRAVENOUS at 08:35

## 2020-12-07 RX ADMIN — PROPOFOL 50 MG: 10 INJECTION, EMULSION INTRAVENOUS at 10:06

## 2020-12-07 RX ADMIN — PROPOFOL 50 MG: 10 INJECTION, EMULSION INTRAVENOUS at 09:54

## 2020-12-07 RX ADMIN — PROPOFOL 50 MG: 10 INJECTION, EMULSION INTRAVENOUS at 10:03

## 2020-12-07 RX ADMIN — PROPOFOL 50 MG: 10 INJECTION, EMULSION INTRAVENOUS at 09:59

## 2020-12-07 RX ADMIN — LIDOCAINE HYDROCHLORIDE 50 MG: 20 INJECTION, SOLUTION EPIDURAL; INFILTRATION; INTRACAUDAL; PERINEURAL at 09:50

## 2020-12-07 RX ADMIN — PROPOFOL 100 MG: 10 INJECTION, EMULSION INTRAVENOUS at 09:53

## 2020-12-07 RX ADMIN — SODIUM CHLORIDE: 9 INJECTION, SOLUTION INTRAVENOUS at 08:51

## 2020-12-07 RX ADMIN — Medication 0.4 MG: at 09:52

## 2020-12-07 ASSESSMENT — PAIN - FUNCTIONAL ASSESSMENT: PAIN_FUNCTIONAL_ASSESSMENT: 0-10

## 2020-12-07 NOTE — H&P
Patient Name: Clifford Ramos  : 1950  MRN: [id number]  DATE: 20      ENDOSCOPY  History and Physical    Procedure:    [] Diagnostic Colonoscopy       [x] Screening Colonoscopy  [] EGD      [] ERCP      [] EUS       [] Other    [x] Previous office notes/History and Physical reviewed from the patients chart. Please see EMR for further details of HPI. I have examined the patient's status immediately prior to the procedure and:      Indications/HPI:    []Abdominal Pain  []Cancer- GI/Lung  []Fhx of colon CA/polyps  []History of Polyps  []Louies   []Melena  []Abnormal Imaging  []Dysphagia    []Persistent Pneumonia  []Anemia  []Food Impaction  []History of Polyps  []GI Bleed  []Pulmonary nodule/Mass  []Change in bowel habits []Heartburn/Reflux  []Rectal Bleed (BRBPR)  []Chest Pain - Non Cardiac []Heme (+) Stoo  l[]Ulcers  []Constipation  []Hemoptysis   []Varices  []Diarrhea  []Hypoxemia  []Nausea/Vomiting  []Screening   []Crohns/Colitis  []Other:    Anesthesia:   [x] MAC [] Moderate Sedation   [] General   [] None     ROS: 12 pt Review of Symptoms was negative unless mentioned above    Medications:   Prior to Admission medications    Medication Sig Start Date End Date Taking? Authorizing Provider   tadalafil (CIALIS) 20 MG tablet Take 1 tablet by mouth at least 60 minutes before sexual intercourse once daily as needed for Erectile Dysfunction 11/10/20  Yes Nandini Cao PA-C   lisinopril (PRINIVIL;ZESTRIL) 20 MG tablet take 1 tablet by mouth once daily 20  Yes Nandini Cao PA-C   amLODIPine (NORVASC) 10 MG tablet take 1 tablet by mouth once daily 20  Yes Nandini Cao PA-C   aspirin 81 MG EC tablet Take 1 tablet by mouth daily 20  Yes Nandini Cao PA-C   latanoprost (XALATAN) 0.005 % ophthalmic solution Use 1 Drop in both eyes daily at bedtime. 1/10/18  Yes Historical Provider, MD   dorzolamide-timolol (COSOPT) 22.3-6.8 MG/ML ophthalmic solution Use 1 Drop in both eyes twice daily.  1/10/18  Yes Historical Provider, MD   hydrochlorothiazide (HYDRODIURIL) 12.5 MG tablet take 1 tablet by mouth once daily 6/3/20   Nandini Cao PA-C   simvastatin (ZOCOR) 20 MG tablet take 1 tablet by mouth at bedtime 20   Nandini Cao PA-C       Allergies: Allergies   Allergen Reactions    Apraclonidine      Other reaction(s): Other: See Comments  Irritation, not tolerable     Brimonidine      Other reaction(s):  Other: See Comments  Follicles         History of allergic reaction to anesthesia:  No    Past Medical History:  Past Medical History:   Diagnosis Date    Diabetes mellitus, type 2 ([facility] [address] 10/11/2011    pt relates he was borderline at one time and now denies being diabetic and relates his physician has not prescribed any meds or blood sugar monitoring    Dyslipidemia 10/11/2011    Erectile dysfunction 2012    Essential hypertension, benign 10/11/2011    Glaucoma     History of colon polyps     adenomatous polyps       Past Surgical History:  Past Surgical History:   Procedure Laterality Date    [facility]    COLONOSCOPY  11    Dr Grubbs Counts    COLONOSCOPY N/A 2016    COLONOSCOPY performed by Balwinder Adame MD at [place] [address] Right 14    [facility]       Social History:  Social History     Tobacco Use    Smoking status: Former Smoker     Packs/day: 1.50     Years: 30.00     Pack years: 45.00     Types: Cigarettes     Last attempt to quit: 2000     Years since quittin.9    Smokeless tobacco: Never Used   Substance Use Topics    Alcohol use: No    Drug use: Not on file       Vital Signs:   Vitals:    20 0821   BP: (!) 173/83   Pulse: 62   Resp: 18   Temp: 96.9 °F (36.1 °C)   SpO2: 96%        Physical Exam:  Cardiac:  [x]WNL  []Comments:  Pulmonary:  [x]WNL   []Comments:   Neuro/Mental Status:  [x]WNL  []Comments:  Abdominal:  [x]WNL    []Comments:  Other:   []WNL  []Comments:    Informed Consent:  The risks and benefits of the procedure have been discussed with either the patient or if they cannot consent, their representative. Assessment:  Patient examined and appropriate for planned sedation and procedure. Plan:  Proceed with planned sedation and procedure as above.     Nico Birmingham MD  9:51 AM

## 2020-12-07 NOTE — ANESTHESIA POSTPROCEDURE EVALUATION
Department of Anesthesiology  Postprocedure Note    Patient: Ava Kaufman  MRN: [id number]  YOB: 1950  Date of evaluation: 12/7/2020  Time:  10:13 AM     Procedure Summary     Date:  12/07/20 Room / Location:  [facility] OR 01 / [facility]    Anesthesia Start:  1061 Anesthesia Stop:      Procedure:  COLONOSCOPY WITH POLYPECTOMY (N/A ) Diagnosis:  (History of colon polyps Z86.010)    Surgeon:  Char Thorpe MD Responsible Provider:  DIO Perry CRNA    Anesthesia Type:  MAC ASA Status:  2          Anesthesia Type: MAC    Sandra Phase I: Sandra Score: 10    Sandra Phase II:      Last vitals: Reviewed and per EMR flowsheets.        Anesthesia Post Evaluation    Patient location during evaluation: bedside  Patient participation: complete - patient participated  Level of consciousness: awake  Pain score: 0  Airway patency: patent  Nausea & Vomiting: no vomiting and no nausea  Complications: no  Cardiovascular status: hemodynamically stable  Respiratory status: acceptable and room air  Hydration status: stable

## 2020-12-07 NOTE — ANESTHESIA PRE PROCEDURE
Department of Anesthesiology  Preprocedure Note       Name:  Sandi Acevedo   Age:  72 y.o.  :  1950                                          MRN:  [id number]         Date:  2020      Surgeon: Kush Nicholson):  Imelda Cabrera MD    Procedure: Procedure(s):  COLORECTAL CANCER SCREENING, HIGH RISK    Medications prior to admission:   Prior to Admission medications    Medication Sig Start Date End Date Taking? Authorizing Provider   tadalafil (CIALIS) 20 MG tablet Take 1 tablet by mouth at least 60 minutes before sexual intercourse once daily as needed for Erectile Dysfunction 11/10/20  Yes Nandini Cao PA-C   lisinopril (PRINIVIL;ZESTRIL) 20 MG tablet take 1 tablet by mouth once daily 20  Yes Nandini Cao PA-C   amLODIPine (NORVASC) 10 MG tablet take 1 tablet by mouth once daily 20  Yes Nandini Cao PA-C   aspirin 81 MG EC tablet Take 1 tablet by mouth daily 20  Yes Nandini Cao PA-C   latanoprost (XALATAN) 0.005 % ophthalmic solution Use 1 Drop in both eyes daily at bedtime. 1/10/18  Yes Historical Provider, MD   dorzolamide-timolol (COSOPT) 22.3-6.8 MG/ML ophthalmic solution Use 1 Drop in both eyes twice daily. 1/10/18  Yes Historical Provider, MD   hydrochlorothiazide (HYDRODIURIL) 12.5 MG tablet take 1 tablet by mouth once daily 6/3/20   Nandini Cao PA-C   simvastatin (ZOCOR) 20 MG tablet take 1 tablet by mouth at bedtime 20   Nandini Cao PA-C       Current medications:    No current facility-administered medications for this encounter. Allergies: Allergies   Allergen Reactions    Apraclonidine      Other reaction(s): Other: See Comments  Irritation, not tolerable     Brimonidine      Other reaction(s):  Other: See Comments  Follicles        Problem List:    Patient Active Problem List   Diagnosis Code    Essential hypertension, benign I10    Dyslipidemia E78.5    Erectile dysfunction N52.9    Morbid obesity with BMI of 40.0-44.9, adult ([facility]) E66.01, Z68.41    Abnormal CBC R79.89    Impacted cerumen, right ear H61.21       Past Medical History:        Diagnosis Date    Diabetes mellitus, type 2 ([address] 10/11/2011    pt relates he was borderline at one time and now denies being diabetic and relates his physician has not prescribed any meds or blood sugar monitoring    Dyslipidemia 10/11/2011    Erectile dysfunction 2012    Essential hypertension, benign 10/11/2011    Glaucoma     History of colon polyps     adenomatous polyps       Past Surgical History:        Procedure Laterality Date    [facility]    COLONOSCOPY  11    Dr Daina Blanca    COLONOSCOPY N/A 2016    COLONOSCOPY performed by Rosemarie Fraser MD at [place] [address] Right 14    [facility]       Social History:    Social History     Tobacco Use    Smoking status: Former Smoker     Packs/day: 1.50     Years: 30.00     Pack years: 45.00     Types: Cigarettes     Last attempt to quit: 2000     Years since quittin.9    Smokeless tobacco: Never Used   Substance Use Topics    Alcohol use: No                                Counseling given: Not Answered      Vital Signs (Current):   Vitals:    20 0821   BP: (!) 173/83   Pulse: 62   Resp: 18   Temp: 36.1 °C (96.9 °F)   TempSrc: Temporal   SpO2: 96%   Weight: 260 lb (117.9 kg)   Height: 5' 7\" (1.702 m)                                              BP Readings from Last 3 Encounters:   20 (!) 173/83   11/10/20 139/70   19 138/72       NPO Status: Time of last liquid consumption: 2130                        Time of last solid consumption: 1800                        Date of last liquid consumption: 20                        Date of last solid food consumption: 20    BMI:   Wt Readings from Last 3 Encounters:   20 260 lb (117.9 kg)   11/10/20 266 lb (120.7 kg)   19 264 lb (119.7 kg)     Body mass index is 40.72 kg/m².     CBC:   Lab Results   Component Value Date WBC 7.0 11/10/2020    RBC 4.88 11/10/2020    HGB 14.1 11/10/2020    HCT 44.0 11/10/2020    MCV 90.0 11/10/2020    RDW 12.9 11/10/2020     11/10/2020       CMP:   Lab Results   Component Value Date     07/10/2020    K 3.7 07/10/2020     07/10/2020    CO2 28 07/10/2020    BUN 10 07/10/2020    CREATININE 0.80 07/10/2020    GFRAA >60.0 07/10/2020    LABGLOM >60.0 07/10/2020    GLUCOSE 112 07/10/2020    GLUCOSE 114 04/21/2012    PROT 6.9 07/10/2020    CALCIUM 9.9 07/10/2020    BILITOT 0.6 07/10/2020    ALKPHOS 67 07/10/2020    AST 16 07/10/2020    ALT 16 07/10/2020       POC Tests: No results for input(s): POCGLU, POCNA, POCK, POCCL, POCBUN, POCHEMO, POCHCT in the last 72 hours. Coags: No results found for: PROTIME, INR, APTT    HCG (If Applicable): No results found for: PREGTESTUR, PREGSERUM, HCG, HCGQUANT     ABGs: No results found for: PHART, PO2ART, BBL5YSP, AYW5QAB, BEART, F3IPOFPV     Type & Screen (If Applicable):  No results found for: LABABO, LABRH    Drug/Infectious Status (If Applicable):  No results found for: HIV, HEPCAB    COVID-19 Screening (If Applicable):   Lab Results   Component Value Date    COVID19 Not Detected 11/30/2020         Anesthesia Evaluation  Patient summary reviewed and Nursing notes reviewed no history of anesthetic complications:   Airway: Mallampati: II  TM distance: >3 FB   Neck ROM: full  Mouth opening: > = 3 FB Dental: normal exam         Pulmonary:                              Cardiovascular:                      Neuro/Psych:               GI/Hepatic/Renal:             Endo/Other:                     Abdominal:           Vascular:                                        Anesthesia Plan      MAC     ASA 2             Anesthetic plan and risks discussed with patient. Plan discussed with attending.                   DIO Dietrich - AGUSTIN   12/7/2020

## 2021-04-27 DIAGNOSIS — N52.2 DRUG-INDUCED ERECTILE DYSFUNCTION: ICD-10-CM

## 2021-04-27 RX ORDER — SILDENAFIL 100 MG/1
TABLET, FILM COATED ORAL
Qty: 6 TABLET | Refills: 1 | Status: SHIPPED | OUTPATIENT
Start: 2021-04-27 | End: 2021-05-10 | Stop reason: SDUPTHER

## 2021-04-27 NOTE — TELEPHONE ENCOUNTER
Scheduled apt on May 10th. Patient calling to ask for script for Viagra prior to apt? Pharmacy: [facility]. Please advise and thank you.

## 2021-04-27 NOTE — TELEPHONE ENCOUNTER
Requested Prescriptions     Pending Prescriptions Disp Refills    sildenafil (VIAGRA) 100 MG tablet 6 tablet 1     Sig: take 1 tablet by mouth if needed for ERECTILE DYSFUNCTION 60 MINUTES BEFORE SEXUAL INTERCOURSE

## 2021-05-10 ENCOUNTER — OFFICE VISIT (OUTPATIENT)
Dept: FAMILY MEDICINE CLINIC | Age: 71
End: 2021-05-10
Payer: MEDICARE

## 2021-05-10 VITALS
RESPIRATION RATE: 18 BRPM | WEIGHT: 270 LBS | OXYGEN SATURATION: 98 % | TEMPERATURE: 97 F | HEART RATE: 74 BPM | HEIGHT: 67 IN | DIASTOLIC BLOOD PRESSURE: 82 MMHG | SYSTOLIC BLOOD PRESSURE: 144 MMHG | BODY MASS INDEX: 42.38 KG/M2

## 2021-05-10 DIAGNOSIS — E55.9 VITAMIN D DEFICIENCY: ICD-10-CM

## 2021-05-10 DIAGNOSIS — R63.5 WEIGHT GAIN: ICD-10-CM

## 2021-05-10 DIAGNOSIS — Z12.5 SCREENING PSA (PROSTATE SPECIFIC ANTIGEN): ICD-10-CM

## 2021-05-10 DIAGNOSIS — N52.2 DRUG-INDUCED ERECTILE DYSFUNCTION: ICD-10-CM

## 2021-05-10 DIAGNOSIS — Z12.5 SCREENING PSA (PROSTATE SPECIFIC ANTIGEN): Primary | ICD-10-CM

## 2021-05-10 DIAGNOSIS — Z00.00 ROUTINE GENERAL MEDICAL EXAMINATION AT A HEALTH CARE FACILITY: Primary | ICD-10-CM

## 2021-05-10 DIAGNOSIS — E66.01 MORBID OBESITY WITH BMI OF 40.0-44.9, ADULT (HCC): ICD-10-CM

## 2021-05-10 LAB
ALBUMIN SERPL-MCNC: 4.3 G/DL (ref 3.5–4.6)
ALP BLD-CCNC: 73 U/L (ref 35–104)
ALT SERPL-CCNC: 13 U/L (ref 0–41)
ANION GAP SERPL CALCULATED.3IONS-SCNC: 12 MEQ/L (ref 9–15)
AST SERPL-CCNC: 16 U/L (ref 0–40)
BASOPHILS ABSOLUTE: 0 K/UL (ref 0–0.2)
BASOPHILS RELATIVE PERCENT: 0.7 %
BILIRUB SERPL-MCNC: 0.5 MG/DL (ref 0.2–0.7)
BUN BLDV-MCNC: 12 MG/DL (ref 8–23)
CALCIUM SERPL-MCNC: 10.4 MG/DL (ref 8.5–9.9)
CHLORIDE BLD-SCNC: 104 MEQ/L (ref 95–107)
CO2: 26 MEQ/L (ref 20–31)
CREAT SERPL-MCNC: 0.8 MG/DL (ref 0.7–1.2)
EOSINOPHILS ABSOLUTE: 0.1 K/UL (ref 0–0.7)
EOSINOPHILS RELATIVE PERCENT: 2.1 %
GFR AFRICAN AMERICAN: >60
GFR NON-AFRICAN AMERICAN: >60
GLOBULIN: 3.3 G/DL (ref 2.3–3.5)
GLUCOSE BLD-MCNC: 128 MG/DL (ref 70–99)
HCT VFR BLD CALC: 43.2 % (ref 42–52)
HEMOGLOBIN: 13.9 G/DL (ref 14–18)
LYMPHOCYTES ABSOLUTE: 1.5 K/UL (ref 1–4.8)
LYMPHOCYTES RELATIVE PERCENT: 28.7 %
MCH RBC QN AUTO: 28.6 PG (ref 27–31.3)
MCHC RBC AUTO-ENTMCNC: 32.2 % (ref 33–37)
MCV RBC AUTO: 88.6 FL (ref 80–100)
MONOCYTES ABSOLUTE: 0.4 K/UL (ref 0.2–0.8)
MONOCYTES RELATIVE PERCENT: 7.9 %
NEUTROPHILS ABSOLUTE: 3.2 K/UL (ref 1.4–6.5)
NEUTROPHILS RELATIVE PERCENT: 60.6 %
PDW BLD-RTO: 13.1 % (ref 11.5–14.5)
PLATELET # BLD: 238 K/UL (ref 130–400)
POTASSIUM SERPL-SCNC: 3.9 MEQ/L (ref 3.4–4.9)
PROSTATE SPECIFIC ANTIGEN: 3.97 NG/ML (ref 0–6.22)
RBC # BLD: 4.88 M/UL (ref 4.7–6.1)
SODIUM BLD-SCNC: 142 MEQ/L (ref 135–144)
T4 FREE: 1.26 NG/DL (ref 0.84–1.68)
TOTAL PROTEIN: 7.6 G/DL (ref 6.3–8)
TSH SERPL DL<=0.05 MIU/L-ACNC: 0.27 UIU/ML (ref 0.44–3.86)
VITAMIN D 25-HYDROXY: 9.5 NG/ML (ref 30–100)
WBC # BLD: 5.3 K/UL (ref 4.8–10.8)

## 2021-05-10 PROCEDURE — 99214 OFFICE O/P EST MOD 30 MIN: CPT | Performed by: PHYSICIAN ASSISTANT

## 2021-05-10 PROCEDURE — G0438 PPPS, INITIAL VISIT: HCPCS | Performed by: PHYSICIAN ASSISTANT

## 2021-05-10 RX ORDER — SILDENAFIL 100 MG/1
TABLET, FILM COATED ORAL
Qty: 6 TABLET | Refills: 1 | Status: SHIPPED | OUTPATIENT
Start: 2021-05-10 | End: 2021-07-14 | Stop reason: SDUPTHER

## 2021-05-10 SDOH — ECONOMIC STABILITY: INCOME INSECURITY: HOW HARD IS IT FOR YOU TO PAY FOR THE VERY BASICS LIKE FOOD, HOUSING, MEDICAL CARE, AND HEATING?: NOT HARD AT ALL

## 2021-05-10 SDOH — ECONOMIC STABILITY: TRANSPORTATION INSECURITY
IN THE PAST 12 MONTHS, HAS THE LACK OF TRANSPORTATION KEPT YOU FROM MEDICAL APPOINTMENTS OR FROM GETTING MEDICATIONS?: NOT ASKED

## 2021-05-10 SDOH — ECONOMIC STABILITY: FOOD INSECURITY: WITHIN THE PAST 12 MONTHS, YOU WORRIED THAT YOUR FOOD WOULD RUN OUT BEFORE YOU GOT MONEY TO BUY MORE.: NOT ASKED

## 2021-05-10 ASSESSMENT — PATIENT HEALTH QUESTIONNAIRE - PHQ9
SUM OF ALL RESPONSES TO PHQ QUESTIONS 1-9: 0
SUM OF ALL RESPONSES TO PHQ9 QUESTIONS 1 & 2: 0

## 2021-05-10 ASSESSMENT — ENCOUNTER SYMPTOMS
ABDOMINAL DISTENTION: 0
EYE PAIN: 0
NAUSEA: 0
PHOTOPHOBIA: 0
EYE REDNESS: 0
ABDOMINAL PAIN: 0
CONSTIPATION: 0
DIARRHEA: 0
BLOOD IN STOOL: 0
WHEEZING: 0
TROUBLE SWALLOWING: 0
CHEST TIGHTNESS: 0
COUGH: 0
SHORTNESS OF BREATH: 0
COLOR CHANGE: 0
SINUS PRESSURE: 0
RECTAL PAIN: 0
BACK PAIN: 0

## 2021-05-10 ASSESSMENT — LIFESTYLE VARIABLES
AUDIT TOTAL SCORE: INCOMPLETE
HOW OFTEN DO YOU HAVE A DRINK CONTAINING ALCOHOL: NEVER
AUDIT-C TOTAL SCORE: INCOMPLETE

## 2021-05-10 NOTE — PROGRESS NOTES
Subjective  Rhiannon Barth, 80 y.o. male presents today with:  Chief Complaint   Patient presents with    Hypertension     6 month follow up      HPI  Fannie Cordell is in the office today for 6 month follow up. Last OV with me: 11/10/2020  Due for routine lab monitoring. Abnormal CBC. Had screening colonoscopy with Dr. Isac Arvizu on 12/7/2020.  1 polyp removed--tubular adenoma per pathology report. Due to have repeat CBC. Denies abnormal bleeding, NSAID/ASA use. HTN.   Complaint with all of his medications.  Denies dizziness, CP, RICHARDSON, SOB.       Mixed hyperlipidemia.  Taking simvastatin 20 mg tablet daily.  Denies myalgia or muscleweakness. Needs to get back in to exercise routine/regimen.      Obesity.  +10 weight gain. Admits to decreased exercise, physical activity. Stays active during the day, but needs to get back in to exercise program/regimen.       Glaucoma.  Followed by Dr. Kayce Dwyer and Dr. Yazmin Matson at [address] eye drops; has been compliant with therapy.      ED. Was taking cialis without notable/signficiant improvement in ED. Trial of viagra 100 mg, patient never picked up from pharmacy; would like it resent, if possible. Has difficulty initially achieving erection. He is able to maintain and achieve orgasm. Denies discharge, dysuria. Review of Systems   Constitutional: Positive for fatigue (intermittent) and unexpected weight change. Negative for activity change, appetite change, chills, diaphoresis and fever. HENT: Negative for congestion, ear discharge, hearing loss, mouth sores, nosebleeds, sinus pressure and trouble swallowing. Eyes: Negative for photophobia, pain, redness and visual disturbance. Respiratory: Negative for cough, chest tightness, shortness of breath and wheezing. Cardiovascular: Negative for chest pain, palpitations and leg swelling.    Gastrointestinal: Negative for abdominal distention, abdominal pain, blood in stool, constipation, diarrhea, nausea and rectal pain.   Genitourinary: Negative for discharge, dysuria, flank pain, frequency, genital sores and urgency. Musculoskeletal: Negative for arthralgias, back pain and gait problem. Skin: Negative for color change and rash. Neurological: Negative for dizziness, weakness, light-headedness and headaches. Psychiatric/Behavioral: Negative for agitation, confusion, dysphoric mood and sleep disturbance. The patient is not nervous/anxious.         Past Medical History:   Diagnosis Date    Diabetes mellitus, type 2 ([address] 10/11/2011    pt relates he was borderline at one time and now denies being diabetic and relates his physician has not prescribed any meds or blood sugar monitoring    Dyslipidemia 10/11/2011    Erectile dysfunction 2012    Essential hypertension, benign 10/11/2011    Glaucoma     History of colon polyps     adenomatous polyps     Past Surgical History:   Procedure Laterality Date    [facility]    COLONOSCOPY  11    Dr Leonora Zazueta    COLONOSCOPY N/A 2016    COLONOSCOPY performed by Nathan Groves MD at [place] [address] N/A 2020    COLONOSCOPY WITH POLYPECTOMY performed by Estefani Griffin MD at [address] [place] [address] Right 14    [facility]     Social History     Socioeconomic History    Marital status: Single     Spouse name: Not on file    Number of children: Not on file    Years of education: Not on file    Highest education level: Not on file   Occupational History    Not on file   Social Needs    Financial resource strain: Not hard at all   [place] insecurity     Worry: Not on file     Inability: Not on file   [employer] needs     Medical: Not on file     Non-medical: Not on file   Tobacco Use    Smoking status: Former Smoker     Packs/day: 1.50     Years: 30.00     Pack years: 45.00     Types: Cigarettes     Quit date: 2000     Years since quittin.3    Smokeless tobacco: Never Used Substance and Sexual Activity    Alcohol use: No    Drug use: Not on file    Sexual activity: Not on file   Lifestyle    Physical activity     Days per week: Not on file     Minutes per session: Not on file    Stress: Not on file   Relationships    Social connections     Talks on phone: Not on file     Gets together: Not on file     Attends [employer] service: Not on file     Active member of club or organization: Not on file     Attends meetings of clubs or organizations: Not on file     Relationship status: Not on file    Intimate partner violence     Fear of current or ex partner: Not on file     Emotionally abused: Not on file     Physically abused: Not on file     Forced sexual activity: Not on file   Other Topics Concern    Not on file   Social History Narrative    Not on file     No family history on file. Allergies   Allergen Reactions    Apraclonidine      Other reaction(s): Other: See Comments  Irritation, not tolerable     Brimonidine      Other reaction(s): Other: See Comments  Follicles      Current Outpatient Medications   Medication Sig Dispense Refill    sildenafil (VIAGRA) 100 MG tablet take 1 tablet by mouth if needed for ERECTILE DYSFUNCTION 60 MINUTES BEFORE SEXUAL INTERCOURSE 6 tablet 1    hydroCHLOROthiazide (HYDRODIURIL) 12.5 MG tablet take 1 tablet by mouth once daily 90 tablet 1    lisinopril (PRINIVIL;ZESTRIL) 20 MG tablet take 1 tablet by mouth once daily 30 tablet 11    simvastatin (ZOCOR) 20 MG tablet take 1 tablet by mouth at bedtime 30 tablet 11    amLODIPine (NORVASC) 10 MG tablet take 1 tablet by mouth once daily 30 tablet 11    aspirin 81 MG EC tablet Take 1 tablet by mouth daily 30 tablet 11    latanoprost (XALATAN) 0.005 % ophthalmic solution Use 1 Drop in both eyes daily at bedtime.  dorzolamide-timolol (COSOPT) 22.3-6.8 MG/ML ophthalmic solution Use 1 Drop in both eyes twice daily. No current facility-administered medications for this visit. PMH, Surgical Hx, Family Hx, and Social Hx reviewed and updated. Health Maintenance reviewed. Objective  Vitals:    05/10/21 1110 05/10/21 1117   BP: (!) 148/84 (!) 144/82   Pulse: 74    Resp: 18    Temp: 97 °F (36.1 °C)    TempSrc: Temporal    SpO2: 98%    Weight: 270 lb (122.5 kg)    Height: 5' 7\" (1.702 m)      BP Readings from Last 3 Encounters:   05/10/21 (!) 144/82   12/07/20 (!) 144/91   12/07/20 128/73     Wt Readings from Last 3 Encounters:   05/10/21 270 lb (122.5 kg)   12/07/20 260 lb (117.9 kg)   11/10/20 266 lb (120.7 kg)     Physical Exam  Constitutional:       General: He is not in acute distress. Appearance: He is well-developed. He is obese. He is not diaphoretic. Comments: Obese male. Sitting comfortably in exam room. In a great mood. HENT:      Head: Normocephalic and atraumatic. Right Ear: Hearing, tympanic membrane, ear canal and external ear normal.      Left Ear: Hearing, tympanic membrane, ear canal and external ear normal.      Nose: Nose normal.      Mouth/Throat:      Pharynx: No oropharyngeal exudate. Eyes:      Conjunctiva/sclera: Conjunctivae normal.      Comments: Wearing glasses. Neck:      Musculoskeletal: Normal range of motion and neck supple. Cardiovascular:      Rate and Rhythm: Normal rate and regular rhythm. Heart sounds: Normal heart sounds. No murmur. Pulmonary:      Effort: Pulmonary effort is normal. No respiratory distress. Breath sounds: Normal breath sounds. No wheezing or rales. Lymphadenopathy:      Cervical: No cervical adenopathy. Skin:     General: Skin is warm and dry. Neurological:      Mental Status: He is alert and oriented to person, place, and time. Cranial Nerves: No cranial nerve deficit. Psychiatric:         Attention and Perception: Attention normal.         Mood and Affect: Mood and affect normal.         Speech: Speech normal.         Behavior: Behavior normal.         Thought Content:  Thought content normal.         Cognition and Memory: Cognition normal.         Judgment: Judgment normal.      Comments: Retired from [employer]. Family lives in [place]. Likes living in [place]. Assessment & Plan   Charolette Heimlich was seen today for hypertension. Diagnoses and all orders for this visit:    Screening PSA (prostate specific antigen)  -     PSA Screening; Future    Morbid obesity with BMI of 40.0-44.9, adult ([facility]CC)  -     CBC Auto Differential; Future  -     Comprehensive Metabolic Panel; Future  -     Insulin Free & Total; Future  -     Vitamin D 25 Hydroxy; Future  -     Testosterone Free And Total Male; Future  -     TSH Without Reflex; Future  -     T4, Free; Future    Drug-induced erectile dysfunction  -     sildenafil (VIAGRA) 100 MG tablet; take 1 tablet by mouth if needed for ERECTILE DYSFUNCTION 60 MINUTES BEFORE SEXUAL INTERCOURSE  -     Testosterone Free And Total Male; Future    Vitamin D deficiency  -     Vitamin D 25 Hydroxy; Future    Weight gain  -     Insulin Free & Total; Future  -     Vitamin D 25 Hydroxy; Future  -     TSH Without Reflex; Future  -     T4, Free; Future    Discussed ED a little further today. Labs for assessment. Consider urology referral if viagra not proven to be effective. 4 month follow up with me.      Orders Placed This Encounter   Procedures    CBC Auto Differential     Standing Status:   Future     Number of Occurrences:   1     Standing Expiration Date:   5/10/2022    Comprehensive Metabolic Panel     Standing Status:   Future     Number of Occurrences:   1     Standing Expiration Date:   5/10/2022    PSA Screening     Standing Status:   Future     Number of Occurrences:   1     Standing Expiration Date:   5/10/2022    Insulin Free & Total     Standing Status:   Future     Number of Occurrences:   1     Standing Expiration Date:   5/10/2022    Vitamin D 25 Hydroxy     Standing Status:   Future     Number of Occurrences:   1     Standing Expiration Date:   5/10/2022   [employer] Testosterone Free And Total Male     Standing Status:   Future     Number of Occurrences:   1     Standing Expiration Date:   5/10/2022    TSH Without Reflex     Standing Status:   Future     Number of Occurrences:   1     Standing Expiration Date:   5/10/2022    T4, Free     Standing Status:   Future     Number of Occurrences:   1     Standing Expiration Date:   5/10/2022     Orders Placed This Encounter   Medications    sildenafil (VIAGRA) 100 MG tablet     Sig: take 1 tablet by mouth if needed for ERECTILE DYSFUNCTION 60 MINUTES BEFORE SEXUAL INTERCOURSE     Dispense:  6 tablet     Refill:  1     Medications Discontinued During This Encounter   Medication Reason    tadalafil (CIALIS) 20 MG tablet LIST CLEANUP    sildenafil (VIAGRA) 100 MG tablet REORDER     No follow-ups on file. Reviewed with the patient: current clinical status, medications, activities and diet. Side effects, adverse effects of the medication prescribed today, as well as treatment plan/ rationale and result expectations have been discussed with the patient who expresses understanding and desires to proceed. Close follow up to evaluate treatment results and for coordination of care. I have reviewed the patient's medical history in detail and updated the computerized patient record.     Nandini Cao PA-C

## 2021-05-10 NOTE — PATIENT INSTRUCTIONS
Personalized Preventive Plan for Ming Hernandez - 5/10/2021  Medicare offers a range of preventive health benefits. Some of the tests and screenings are paid in full while other may be subject to a deductible, co-insurance, and/or copay. Some of these benefits include a comprehensive review of your medical history including lifestyle, illnesses that may run in your family, and various assessments and screenings as appropriate. After reviewing your medical record and screening and assessments performed today your provider may have ordered immunizations, labs, imaging, and/or referrals for you. A list of these orders (if applicable) as well as your Preventive Care list are included within your After Visit Summary for your review. Other Preventive Recommendations:    · A preventive eye exam performed by an eye specialist is recommended every 1-2 years to screen for glaucoma; cataracts, macular degeneration, and other eye disorders. · A preventive dental visit is recommended every 6 months. · Try to get at least 150 minutes of exercise per week or 10,000 steps per day on a pedometer . · Order or download the FREE \"Exercise & Physical Activity: Your Everyday Guide\" from [employer]. Call 1-377.685.9255 or search [employer] online. · You need 8995-9990 mg of calcium and 5429-2011 IU of vitamin D per day. It is possible to meet your calcium requirement with diet alone, but a vitamin D supplement is usually necessary to meet this goal.  · When exposed to the sun, use a sunscreen that protects against both UVA and UVB radiation with an SPF of 30 or greater. Reapply every 2 to 3 hours or after sweating, drying off with a towel, or swimming. · Always wear a seat belt when traveling in a car. Always wear a helmet when riding a bicycle or motorcycle.

## 2021-05-10 NOTE — PROGRESS NOTES
Medicare Annual Wellness Visit  Name: Iraida Click Date: 5/10/2021   MRN: [id number] Sex: Male   Age: 80 y.o. Ethnicity: Non-/Non    : 1950 Race: Black      Marcell Carey is here for [employer] (Annual exam )    Screenings for behavioral, psychosocial and functional/safety risks, and cognitive dysfunction are all negative except as indicated below. These results, as well as other patient data from the [address] form, are documented in Flowsheets linked to this Encounter. Allergies   Allergen Reactions    Apraclonidine      Other reaction(s): Other: See Comments  Irritation, not tolerable     Brimonidine      Other reaction(s): Other: See Comments  Follicles        Prior to Visit Medications    Medication Sig Taking? Authorizing Provider   hydroCHLOROthiazide (HYDRODIURIL) 12.5 MG tablet take 1 tablet by mouth once daily Yes Nandini Cao PA-C   lisinopril (PRINIVIL;ZESTRIL) 20 MG tablet take 1 tablet by mouth once daily Yes Nandini Cao PA-C   simvastatin (ZOCOR) 20 MG tablet take 1 tablet by mouth at bedtime Yes Nandini Cao PA-C   amLODIPine (NORVASC) 10 MG tablet take 1 tablet by mouth once daily Yes Nandini Cao PA-C   aspirin 81 MG EC tablet Take 1 tablet by mouth daily Yes Nandini Cao PA-C   latanoprost (XALATAN) 0.005 % ophthalmic solution Use 1 Drop in both eyes daily at bedtime. Yes Historical Provider, MD   dorzolamide-timolol (COSOPT) 22.3-6.8 MG/ML ophthalmic solution Use 1 Drop in both eyes twice daily.  Yes Historical Provider, MD   sildenafil (VIAGRA) 100 MG tablet take 1 tablet by mouth if needed for ERECTILE DYSFUNCTION 60 MINUTES BEFORE SEXUAL INTERCOURSE  Nandini Cao PA-C       Past Medical History:   Diagnosis Date    Diabetes mellitus, type 2 ([facility] [address] 10/11/2011    pt relates he was borderline at one time and now denies being diabetic and relates his physician has not prescribed any meds or blood sugar monitoring    Dyslipidemia 10/11/2011  Erectile dysfunction 7/19/2012    Essential hypertension, benign 10/11/2011    Glaucoma     History of colon polyps     adenomatous polyps       Past Surgical History:   Procedure Laterality Date    [facility]    COLONOSCOPY  9/26/11    Dr Anna Tejeda    COLONOSCOPY N/A 11/11/2016    COLONOSCOPY performed by Ike Taylor MD at [address] COLONOSCOPY N/A 12/7/2020    COLONOSCOPY WITH POLYPECTOMY performed by Destiney Brandt MD at [address] Right 5/1/14    [facility]       No family history on file. CareTeam (Including outside providers/suppliers regularly involved in providing care):   Patient Care Team:  Brady Richmond PA-C as PCP - General (Family Medicine)  Brady Richmond PA-C as PCP - [facility] Provider    Wt Readings from Last 3 Encounters:   05/10/21 270 lb (122.5 kg)   12/07/20 260 lb (117.9 kg)   11/10/20 266 lb (120.7 kg)     There were no vitals filed for this visit. There is no height or weight on file to calculate BMI. Based upon direct observation of the patient, evaluation of cognition reveals recent and remote memory intact. Patient's complete Health Risk Assessment and screening values have been reviewed and are found in Flowsheets. The following problems were reviewed today and where indicated follow up appointments were made and/or referrals ordered. Positive Risk Factor Screenings with Interventions:          General Health and ACP:  General  In general, how would you say your health is?: Good  In the past 7 days, have you experienced any of the following?  New or Increased Pain, New or Increased Fatigue, Loneliness, Social Isolation, Stress or Anger?: (!) Stress, Anger  Do you get the social and emotional support that you need?: Yes  Do you have a Living Will?: Yes  Advance Directives     Power of  Living Will ACP-Advance Directive ACP-Power of     Not on File Filed on 11/15/16 Filed Not on File      General Health Risk Interventions:  · Stress: patient declines any further evaluation/treatment for this issue  · Anger: patient declines any further evaluation/treatment for this issue    Health Habits/Nutrition:  Health Habits/Nutrition  Do you exercise for at least 20 minutes 2-3 times per week?: (!) No  Have you lost any weight without trying in the past 3 months?: No  Do you eat only one meal per day?: No  Have you seen the dentist within the past year?: (!) No     Health Habits/Nutrition Interventions:  · Inadequate physical activity:  plans on starting exercise regimen   · Dental exam overdue:  patient declines dental evaluation       Personalized Preventive Plan   Current Health Maintenance Status  Immunization History   Administered Date(s) Administered    Influenza 10/18/2012    Influenza Vaccine, unspecified formulation 12/28/2016    Influenza Virus Vaccine 09/17/2015    Influenza, High Dose (Fluzone 65 yrs and older) 12/28/2016    Influenza, Dene Schlein, IM, (6 mo and older Fluzone, Flulaval, Fluarix and 3 yrs and older Afluria) 11/10/2020    Influenza, Triv, inactivated, subunit, adjuvanted, IM (Fluad 65 yrs and older) 10/13/2017, 10/13/2018, 08/15/2019    Pneumococcal Conjugate 13-valent (Lenetta Primrose) 01/06/2017, 05/09/2019        Health Maintenance   Topic Date Due    AAA screen  Never done    COVID-19 Vaccine (1) Never done    DTaP/Tdap/Td vaccine (1 - Tdap) Never done   [employer] Visit (AWV)  05/05/2021    Shingles Vaccine (1 of 2) 11/10/2021 (Originally 2/18/2001)    Pneumococcal 65+ years Vaccine (2 of 2 - PPSV23) 11/10/2021 (Originally 5/9/2020)    Lipid screen  07/10/2021    Potassium monitoring  07/10/2021    Creatinine monitoring  07/10/2021    Diabetes screen  05/10/2022    Colon cancer screen colonoscopy  12/07/2025    Flu vaccine  Completed    Hepatitis C screen  Completed    Hepatitis A vaccine  Aged Out    Hepatitis B vaccine  Aged Out    Hib vaccine  Aged Out    Meningococcal (ACWY) vaccine  Aged Out     Recommendations for Preventive Services Due: see orders and patient instructions/AVS.  . Recommended screening schedule for the next 5-10 years is provided to the patient in written form: see Patient Ye Hernandez was seen today for medicare awv. Diagnoses and all orders for this visit:    Routine general medical examination at a health care facility             [facility] DEVORAH Cao PA-C

## 2021-05-11 LAB
SEX HORMONE BINDING GLOBULIN: 61 NMOL/L (ref 11–80)
TESTOSTERONE FREE PERCENT: 1.3 % (ref 1.6–2.9)
TESTOSTERONE FREE, CALC: 62 PG/ML (ref 47–244)
TESTOSTERONE TOTAL-MALE: 485 NG/DL (ref 300–720)

## 2021-05-12 LAB
INSULIN FREE: 22 UIU/ML (ref 3–19)
INSULIN: 28 UIU/ML (ref 3–19)

## 2021-05-16 DIAGNOSIS — E55.9 VITAMIN D DEFICIENCY: Primary | ICD-10-CM

## 2021-05-16 RX ORDER — IRON HEME POLYPEPTIDE/FOLIC AC 12-1MG
5000 TABLET ORAL DAILY
Qty: 90 CAPSULE | Refills: 4 | Status: SHIPPED | OUTPATIENT
Start: 2021-05-16 | End: 2022-05-20 | Stop reason: SDUPTHER

## 2021-07-14 DIAGNOSIS — N52.2 DRUG-INDUCED ERECTILE DYSFUNCTION: ICD-10-CM

## 2021-07-14 RX ORDER — SILDENAFIL 100 MG/1
TABLET, FILM COATED ORAL
Qty: 6 TABLET | Refills: 5 | Status: SHIPPED | OUTPATIENT
Start: 2021-07-14 | End: 2022-03-04 | Stop reason: SDUPTHER

## 2021-09-15 ENCOUNTER — OFFICE VISIT (OUTPATIENT)
Dept: FAMILY MEDICINE CLINIC | Age: 71
End: 2021-09-15
Payer: MEDICARE

## 2021-09-15 VITALS
BODY MASS INDEX: 40.46 KG/M2 | WEIGHT: 257.8 LBS | HEART RATE: 58 BPM | OXYGEN SATURATION: 99 % | HEIGHT: 67 IN | TEMPERATURE: 97.7 F | SYSTOLIC BLOOD PRESSURE: 138 MMHG | DIASTOLIC BLOOD PRESSURE: 84 MMHG

## 2021-09-15 DIAGNOSIS — Z23 NEED FOR VACCINATION: ICD-10-CM

## 2021-09-15 DIAGNOSIS — E55.9 VITAMIN D DEFICIENCY: Primary | ICD-10-CM

## 2021-09-15 PROCEDURE — 90694 VACC AIIV4 NO PRSRV 0.5ML IM: CPT | Performed by: INTERNAL MEDICINE

## 2021-09-15 PROCEDURE — 99213 OFFICE O/P EST LOW 20 MIN: CPT | Performed by: INTERNAL MEDICINE

## 2021-09-15 PROCEDURE — G0008 ADMIN INFLUENZA VIRUS VAC: HCPCS | Performed by: INTERNAL MEDICINE

## 2021-09-15 ASSESSMENT — ENCOUNTER SYMPTOMS
ABDOMINAL DISTENTION: 0
WHEEZING: 0
EYE DISCHARGE: 0
DIARRHEA: 0
CHEST TIGHTNESS: 0
COUGH: 0
SINUS PAIN: 0
SHORTNESS OF BREATH: 0
SORE THROAT: 0
EYE REDNESS: 0
SINUS PRESSURE: 0
VOMITING: 0
EYE ITCHING: 0
COLOR CHANGE: 0
ABDOMINAL PAIN: 0

## 2021-09-15 NOTE — PROGRESS NOTES
Vaccine Information Sheet, \"Influenza - Inactivated\"  given to Nicole Valentin, or parent/legal guardian of  Nicole Valentin and verbalized understanding. Patient responses:    Have you ever had a reaction to a flu vaccine? No  Are you able to eat eggs without adverse effects? Yes  Do you have any current illness? No  Have you ever had Guillian [place]zuma Syndrome? No    Flu vaccine given per order. Please see immunization tab.

## 2021-09-15 NOTE — PROGRESS NOTES
Subjective:      Patient ID: Lynda Meneses is a 80 y.o. male who presents today for:  Chief Complaint   Patient presents with    Follow-up     no new concern, would like the flu shot        HPI   Patient is presenting today for routine follow up. He has been doing well since his last visit and offers no medical complaints at this time. His Annual Wellness Visit was in May with scheduled follow up for symptomatic Vitamin D deficiency (noted on previous labs on 5/10/21- 9.2). He reports compliance with his Vitamin D supplements. Patient would like to receive the flu vaccine today. Past Medical History:   Diagnosis Date    Diabetes mellitus, type 2 ([facility] [address] 10/11/2011    pt relates he was borderline at one time and now denies being diabetic and relates his physician has not prescribed any meds or blood sugar monitoring    Dyslipidemia 10/11/2011    Erectile dysfunction 7/19/2012    Essential hypertension, benign 10/11/2011    Glaucoma     History of colon polyps     adenomatous polyps     Past Surgical History:   Procedure Laterality Date    [facility]    COLONOSCOPY  9/26/11    Dr Rozina Ibarra    COLONOSCOPY N/A 11/11/2016    [facility] N/A 12/7/2020    COLONOSCOPY WITH POLYPECTOMY performed by Cindi Mcwilliams MD at [address] [facility] Right 5/1/14    [facility]     No family history on file. Allergies   Allergen Reactions    Apraclonidine      Other reaction(s): Other: See Comments  Irritation, not tolerable     Brimonidine      Other reaction(s):  Other: See Comments  Follicles      Current Outpatient Medications on File Prior to Visit   Medication Sig Dispense Refill    sildenafil (VIAGRA) 100 MG tablet take 1 tablet by mouth if needed for ERECTILE DYSFUNCTION 60 MINUTES BEFORE SEXUAL INTERCOURSE 6 tablet 5    hydroCHLOROthiazide (HYDRODIURIL) 12.5 MG tablet take 1 tablet by mouth once daily 90 tablet 1    lisinopril (PRINIVIL;ZESTRIL) 20 MG tablet take 1 tablet by mouth once daily 30 tablet 11    vitamin D (DIALYVITE VITAMIN D 5000) 125 MCG (5000 UT) CAPS capsule Take 1 capsule by mouth daily 90 capsule 4    amLODIPine (NORVASC) 10 MG tablet take 1 tablet by mouth once daily 30 tablet 11    simvastatin (ZOCOR) 20 MG tablet take 1 tablet by mouth at bedtime 30 tablet 11    aspirin 81 MG EC tablet Take 1 tablet by mouth daily 30 tablet 11    latanoprost (XALATAN) 0.005 % ophthalmic solution Use 1 Drop in both eyes daily at bedtime.  dorzolamide-timolol (COSOPT) 22.3-6.8 MG/ML ophthalmic solution Use 1 Drop in both eyes twice daily. No current facility-administered medications on file prior to visit. Review of Systems   Constitutional: Negative for activity change, appetite change, chills, fatigue, fever and unexpected weight change. HENT: Negative for ear discharge, ear pain, sinus pressure, sinus pain and sore throat. Eyes: Negative for discharge, redness, itching and visual disturbance. Respiratory: Negative for cough, chest tightness, shortness of breath and wheezing. Cardiovascular: Negative for chest pain, palpitations and leg swelling. Gastrointestinal: Negative for abdominal distention, abdominal pain, diarrhea and vomiting. Endocrine: Negative for cold intolerance, heat intolerance, polydipsia and polyuria. Genitourinary: Negative for dysuria, flank pain and frequency. Skin: Negative for color change and rash. Neurological: Negative for dizziness, seizures, syncope, light-headedness and headaches. Objective:   /84 (Site: Left Upper Arm, Position: Sitting, Cuff Size: Medium Adult)   Pulse 58   Temp 97.7 °F (36.5 °C) (Oral)   Ht 5' 7\" (1.702 m)   Wt 257 lb 12.8 oz (116.9 kg)   SpO2 99%   BMI 40.38 kg/m²     Physical Exam  Constitutional:       General: He is not in acute distress. Appearance: Normal appearance. He is normal weight. He is not diaphoretic. HENT:      Head: Normocephalic and atraumatic. Cardiovascular:      Rate and Rhythm: Normal rate and regular rhythm. Pulses: Normal pulses. Heart sounds: Normal heart sounds. No murmur heard. No friction rub. Pulmonary:      Effort: Pulmonary effort is normal. No respiratory distress. Breath sounds: Normal breath sounds. No wheezing or rhonchi. Chest:      Chest wall: No tenderness. Abdominal:      General: Abdomen is flat. Bowel sounds are normal. There is no distension. Palpations: Abdomen is soft. Tenderness: There is no abdominal tenderness. Musculoskeletal:         General: No tenderness. Normal range of motion. Right lower leg: No edema. Left lower leg: No edema. Neurological:      Mental Status: He is alert. Assessment:      Amber Shaikh was seen today for follow-up. Diagnoses and all orders for this visit:    Vitamin D deficiency  -     Vitamin D 25 Hydroxy;  Future    Need for vaccination  -     INFLUENZA, QUADV, ADJUVANTED, 72 YRS =, IM, PF, PREFILL SYR, 0.5ML (FLUAD)        Health Maintenance   Topic Date Due    AAA screen  Never done    DTaP/Tdap/Td vaccine (1 - Tdap) Never done    Lipid screen  07/10/2021    Flu vaccine (1) 09/01/2021    Shingles Vaccine (1 of 2) 11/10/2021 (Originally 2/18/2001)    Potassium monitoring  05/10/2022    Creatinine monitoring  05/10/2022    Diabetes screen  05/10/2022    Annual Wellness Visit (AWV)  05/11/2022    Colon cancer screen colonoscopy  12/07/2025    Pneumococcal 65+ years Vaccine  Completed    COVID-19 Vaccine  Completed    Hepatitis C screen  Completed    Hepatitis A vaccine  Aged Out    Hepatitis B vaccine  Aged Out    Hib vaccine  Aged Out    Meningococcal (ACWY) vaccine  Aged Out            Plan:      Orders Placed This Encounter   Procedures    INFLUENZA, QUADV, ADJUVANTED, 65 YRS =, IM, PF, PREFILL SYR, 0.5ML (FLUAD)    Vitamin D 25 Hydroxy     Standing Status: Future     Standing Expiration Date:   9/15/2022     No orders of the defined types were placed in this encounter. 1. Patient presenting today for routine f/u. Diagnosed with Vitamin D deficiency at previous visit. Reports compliance with Vitamin D supplements, no active complaints. Will repeat Vit D level today. 2. Health Maintenance - Influenza vaccination today. F/u in 6 months as part of routine follow up.      Denise Khalil MD

## 2021-09-15 NOTE — PROGRESS NOTES
Vaccine Information Sheet, \"Influenza - Inactivated\"  given to Bo Dakins, or parent/legal guardian of  Bo Dakins and verbalized understanding. Patient responses:    Have you ever had a reaction to a flu vaccine? No  Are you able to eat eggs without adverse effects? Yes  Do you have any current illness? No  Have you ever had Guillian [place] Syndrome? No    Flu vaccine given per order. Please see immunization tab.

## 2022-02-28 ENCOUNTER — TELEPHONE (OUTPATIENT)
Dept: FAMILY MEDICINE CLINIC | Age: 72
End: 2022-02-28

## 2022-02-28 DIAGNOSIS — N52.2 DRUG-INDUCED ERECTILE DYSFUNCTION: ICD-10-CM

## 2022-03-04 DIAGNOSIS — N52.2 DRUG-INDUCED ERECTILE DYSFUNCTION: ICD-10-CM

## 2022-03-04 RX ORDER — SILDENAFIL 100 MG/1
TABLET, FILM COATED ORAL
Qty: 18 TABLET | Refills: 2 | Status: SHIPPED | OUTPATIENT
Start: 2022-03-04 | End: 2022-08-18 | Stop reason: SDUPTHER

## 2022-03-04 RX ORDER — SILDENAFIL 100 MG/1
TABLET, FILM COATED ORAL
Qty: 18 TABLET | Refills: 2 | Status: CANCELLED | OUTPATIENT
Start: 2022-03-04

## 2022-03-04 NOTE — TELEPHONE ENCOUNTER
Confirmed with [facility] pharmacy that dispensing patients Viagra at a quantity of 18 should be fine. They will run it through insurance. I have pended the script for quantity of 18 with 2 refills. Thank you.

## 2022-03-04 NOTE — TELEPHONE ENCOUNTER
Check with pharm to see if they will approve/fill more. 3 month supple would be 18. I am OK with this, if pharmacy will fill like this.

## 2022-03-25 ENCOUNTER — TELEPHONE (OUTPATIENT)
Dept: FAMILY MEDICINE CLINIC | Age: 72
End: 2022-03-25

## 2022-03-25 ENCOUNTER — OFFICE VISIT (OUTPATIENT)
Dept: FAMILY MEDICINE CLINIC | Age: 72
End: 2022-03-25
Payer: MEDICARE

## 2022-03-25 VITALS
RESPIRATION RATE: 17 BRPM | BODY MASS INDEX: 40.34 KG/M2 | WEIGHT: 257 LBS | SYSTOLIC BLOOD PRESSURE: 138 MMHG | DIASTOLIC BLOOD PRESSURE: 80 MMHG | TEMPERATURE: 96.8 F | HEART RATE: 68 BPM | OXYGEN SATURATION: 98 % | HEIGHT: 67 IN

## 2022-03-25 DIAGNOSIS — H91.91 DECREASED HEARING OF RIGHT EAR: ICD-10-CM

## 2022-03-25 DIAGNOSIS — H61.21 IMPACTED CERUMEN OF RIGHT EAR: Primary | ICD-10-CM

## 2022-03-25 PROCEDURE — 99212 OFFICE O/P EST SF 10 MIN: CPT

## 2022-03-25 NOTE — TELEPHONE ENCOUNTER
Patient calling in stating he is taking over the counter vitamin C and vitamin D patient would like to know if he can have a prescription for both so he can go through the pharmacy.

## 2022-03-25 NOTE — PATIENT INSTRUCTIONS
Patient Education        Earwax Blockage: Care Instructions  Your Care Instructions     Earwax is a natural substance that protects the ear canal. Normally, earwax drains from the ears and does not cause problems. Sometimes earwax builds up and hardens. Earwax blockage (also called cerumen impaction) can cause some loss of hearing and pain. When wax is tightly packed, you will need to have your doctor remove it. Follow-up care is a key part of your treatment and safety. Be sure to make and go to all appointments, and call your doctor if you are having problems. It's also a good idea to know your test results and keep a list of the medicines you take. How can you care for yourself at home? · Do not try to remove earwax with cotton swabs, fingers, or other objects. This can make the blockage worse and damage the eardrum. · If your doctor recommends that you try to remove earwax at home:  ? Soften and loosen the earwax with warm mineral oil. You also can try hydrogen peroxide mixed with an equal amount of room temperature water. Place 2 drops of the fluid, warmed to body temperature, in the ear two times a day for up to 5 days. ? Once the wax is loose and soft, all that is usually needed to remove it from the ear canal is a gentle, warm shower. Direct the water into the ear, then tip your head to let the earwax drain out. Dry your ear thoroughly with a hair dryer set on low. Hold the dryer several inches from your ear. ? If the warm mineral oil and shower do not work, use an over-the-counter wax softener. Read and follow all instructions on the label. After using the wax softener, use an ear syringe to gently flush the ear. Make sure the flushing solution is body temperature. Cool or hot fluids in the ear can cause dizziness. When should you call for help?    Call your doctor now or seek immediate medical care if:    · Pus or blood drains from your ear.     · Your ears are ringing or feel full.     · You have a loss of hearing. Watch closely for changes in your health, and be sure to contact your doctor if:    · You have pain or reduced hearing after 1 week of home treatment.     · You have any new symptoms, such as nausea or balance problems. Where can you learn more? Go to [email or web address] and sign in to your [employer] account. Enter F347 in the [employer] box to learn more about \"Earwax Blockage: Care Instructions. \"     If you do not have an account, please click on the \"Sign Up Now\" link. Current as of: July 1, 2021               Content Version: 13.1  © 3592-2268 Healthwise, [employer]. Care instructions adapted under license by [facility] ([facility]). If you have questions about a medical condition or this instruction, always ask your healthcare professional. [address] any warranty or liability for your use of this information.

## 2022-03-25 NOTE — TELEPHONE ENCOUNTER
Patient was being seen in walk in so asked patient while he was here what doses he said he is not sure he will give me a call when he gets home to tell me   What doses he uses

## 2022-03-25 NOTE — PROGRESS NOTES
Subjective: Shu Bethea is a 71 y.o. male who presents for evaluation of a plugged ear. He noticed the symptoms in the right ear, 3 days ago. Marcell denies ear pain. Past Medical History:   Diagnosis Date    Diabetes mellitus, type 2 ([facility] [address] 10/11/2011    pt relates he was borderline at one time and now denies being diabetic and relates his physician has not prescribed any meds or blood sugar monitoring    Dyslipidemia 10/11/2011    Erectile dysfunction 2012    Essential hypertension, benign 10/11/2011    Glaucoma     History of colon polyps     adenomatous polyps     Patient Active Problem List    Diagnosis Date Noted    Adenomatous polyp of sigmoid colon     Abnormal CBC 11/10/2020    Impacted cerumen, right ear 11/10/2020    Morbid obesity with BMI of 40.0-44.9, adult ([facility] [address] 02/10/2014    Erectile dysfunction 2012    Essential hypertension, benign 10/11/2011    Dyslipidemia 10/11/2011     Past Surgical History:   Procedure Laterality Date    [facility]    COLONOSCOPY  11    Dr Harry Arnold    COLONOSCOPY N/A 2016    [facility]ad [facility]illo [facility] at [facility]    COLONOSCOPY WITH POLYPECTOMY performed by Anais Melendez MD at [address] [place] [address] 14    [facility]     History reviewed. No pertinent family history.   Social History     Socioeconomic History    Marital status: Single     Spouse name: None    Number of children: None    Years of education: None    Highest education level: None   Occupational History    None   Tobacco Use    Smoking status: Former Smoker     Packs/day: 1.50     Years: 30.00     Pack years: 45.00     Types: Cigarettes     Quit date: 2000     Years since quittin.2    Smokeless tobacco: Never Used   Vaping Use    Vaping Use: Never used   Substance and Sexual Activity    Alcohol use: No    Drug use: None    Sexual activity: None   Other Topics Concern    None   Social History Narrative    None     Social Determinants of Health     Financial Resource Strain: Low Risk     Difficulty of Paying Living Expenses: Not hard at all   Food Insecurity:     Worried About [address] in the Last Year: Not on file    Paulina of Food in the Last Year: Not on file   Transportation Needs:     Lack of Transportation (Medical): Not on file    Lack of Transportation (Non-Medical):  Not on file   Physical Activity:     Days of Exercise per Week: Not on file    Minutes of Exercise per Session: Not on file   Stress:     Feeling of Stress : Not on file   Social Connections:     Frequency of Communication with Friends and Family: Not on file    Frequency of Social Gatherings with Friends and Family: Not on file    Attends Confucianist Services: Not on file    Active Member of [address] [place] [address] or Organizations: Not on file    Attends Club or Organization Meetings: Not on file    Marital Status: Not on file   Intimate Partner Violence:     Fear of Current or Ex-Partner: Not on file    Emotionally Abused: Not on file    Physically Abused: Not on file    Sexually Abused: Not on file   Housing Stability:     Unable to Pay for Housing in the Last Year: Not on file    Number of [facility] in the Last Year: Not on file    Unstable Housing in the Last Year: Not on file     Current Outpatient Medications   Medication Sig Dispense Refill    sildenafil (VIAGRA) 100 MG tablet take 1 tablet by mouth if needed for ERECTILE DYSFUNCTION 60 MINUTES BEFORE SEXUAL INTERCOURSE 18 tablet 2    hydroCHLOROthiazide (HYDRODIURIL) 12.5 MG tablet take 1 tablet by mouth once daily 90 tablet 1    lisinopril (PRINIVIL;ZESTRIL) 20 MG tablet take 1 tablet by mouth once daily 30 tablet 11    vitamin D (DIALYVITE VITAMIN D 5000) 125 MCG (5000 UT) CAPS capsule Take 1 capsule by mouth daily 90 capsule 4    amLODIPine (NORVASC) 10 MG tablet take 1 tablet by mouth once daily 30 tablet 11    simvastatin (ZOCOR) 20 MG tablet take 1 tablet by mouth at bedtime 30 tablet 11    aspirin 81 MG EC tablet Take 1 tablet by mouth daily 30 tablet 11    latanoprost (XALATAN) 0.005 % ophthalmic solution Use 1 Drop in both eyes daily at bedtime.  dorzolamide-timolol (COSOPT) 22.3-6.8 MG/ML ophthalmic solution Use 1 Drop in both eyes twice daily. No current facility-administered medications for this visit. Current Outpatient Medications on File Prior to Visit   Medication Sig Dispense Refill    sildenafil (VIAGRA) 100 MG tablet take 1 tablet by mouth if needed for ERECTILE DYSFUNCTION 60 MINUTES BEFORE SEXUAL INTERCOURSE 18 tablet 2    hydroCHLOROthiazide (HYDRODIURIL) 12.5 MG tablet take 1 tablet by mouth once daily 90 tablet 1    lisinopril (PRINIVIL;ZESTRIL) 20 MG tablet take 1 tablet by mouth once daily 30 tablet 11    vitamin D (DIALYVITE VITAMIN D 5000) 125 MCG (5000 UT) CAPS capsule Take 1 capsule by mouth daily 90 capsule 4    amLODIPine (NORVASC) 10 MG tablet take 1 tablet by mouth once daily 30 tablet 11    simvastatin (ZOCOR) 20 MG tablet take 1 tablet by mouth at bedtime 30 tablet 11    aspirin 81 MG EC tablet Take 1 tablet by mouth daily 30 tablet 11    latanoprost (XALATAN) 0.005 % ophthalmic solution Use 1 Drop in both eyes daily at bedtime.  dorzolamide-timolol (COSOPT) 22.3-6.8 MG/ML ophthalmic solution Use 1 Drop in both eyes twice daily. No current facility-administered medications on file prior to visit. Allergies   Allergen Reactions    Apraclonidine      Other reaction(s): Other: See Comments  Irritation, not tolerable     Brimonidine      Other reaction(s): Other: See Comments  Follicles        Review of Systems  A comprehensive review of systems was negative.       Objective:      /80   Pulse 68   Temp 96.8 °F (36 °C) (Temporal)   Resp 17   Ht 5' 7\" (1.702 m)   Wt 257 lb (116.6 kg)   SpO2 98%   BMI 40.25 kg/m²   General: alert, appears stated age and cooperative   Right Ear: right canal ceruminous: cleaned with curette   Left Ear:  normal appearance   After removal: normal on the right         Assessment:      Cerumen Impaction, without otitis externa. Plan:      Cerumen removed by emma. Care instructions given. Home treatment: none. Follow up as needed.

## 2022-03-25 NOTE — PROGRESS NOTES
Subjective  Irma Collins, 71 y.o. male presents today with:  Chief Complaint   Patient presents with    Ear Fullness      some fullness in right ear for 3-4 days; some sore throat at night        HPI      Review of Systems      PMH, Surgical Hx, Family Hx, and Social Hx reviewed and updated. Objective  Vitals:    03/25/22 1715   BP: 138/80   Pulse: 68   Resp: 17   Temp: 96.8 °F (36 °C)   TempSrc: Temporal   SpO2: 98%   Weight: 257 lb (116.6 kg)   Height: 5' 7\" (1.702 m)     BP Readings from Last 3 Encounters:   03/25/22 138/80   09/15/21 138/84   05/10/21 (!) 144/82     Wt Readings from Last 3 Encounters:   03/25/22 257 lb (116.6 kg)   09/15/21 257 lb 12.8 oz (116.9 kg)   05/10/21 270 lb (122.5 kg)     Physical Exam  Assessment & Plan   {There are no diagnoses linked to this encounter. (Refresh or delete this SmartLink)}   No orders of the defined types were placed in this encounter. No orders of the defined types were placed in this encounter. No follow-ups on file. Reviewed with the patient: current clinical status,medications, activities and diet. Side effects, adverse effects of themedication prescribed today, as well as treatment plan/ rationale and result expectations have been discussed with the patient who expresses understanding and desires to proceed. Close follow up to evaluate treatment results and for coordination of care. I have reviewed the patient's medical history in detail and updated the computerized patient record.     DIO Canas NP

## 2022-03-28 NOTE — TELEPHONE ENCOUNTER
Patient is taking Vit C over the counter it is 1000mg. He is asking if this cant be sent to his pharmacy.      Please advise and thank you

## 2022-05-20 ENCOUNTER — OFFICE VISIT (OUTPATIENT)
Dept: FAMILY MEDICINE CLINIC | Age: 72
End: 2022-05-20
Payer: MEDICARE

## 2022-05-20 VITALS
SYSTOLIC BLOOD PRESSURE: 140 MMHG | BODY MASS INDEX: 39.24 KG/M2 | DIASTOLIC BLOOD PRESSURE: 80 MMHG | HEART RATE: 52 BPM | HEIGHT: 67 IN | WEIGHT: 250 LBS | OXYGEN SATURATION: 96 % | RESPIRATION RATE: 16 BRPM

## 2022-05-20 DIAGNOSIS — E78.5 DYSLIPIDEMIA: ICD-10-CM

## 2022-05-20 DIAGNOSIS — E66.01 SEVERE OBESITY (BMI 35.0-39.9) WITH COMORBIDITY (HCC): ICD-10-CM

## 2022-05-20 DIAGNOSIS — Z12.5 PROSTATE CANCER SCREENING: ICD-10-CM

## 2022-05-20 DIAGNOSIS — E55.9 VITAMIN D DEFICIENCY: ICD-10-CM

## 2022-05-20 DIAGNOSIS — Z00.00 MEDICARE ANNUAL WELLNESS VISIT, SUBSEQUENT: Primary | ICD-10-CM

## 2022-05-20 DIAGNOSIS — R79.89 ABNORMAL CBC: ICD-10-CM

## 2022-05-20 DIAGNOSIS — R73.09 BLOOD GLUCOSE ABNORMAL: ICD-10-CM

## 2022-05-20 DIAGNOSIS — I10 ESSENTIAL HYPERTENSION, BENIGN: ICD-10-CM

## 2022-05-20 DIAGNOSIS — Z20.2 POTENTIAL EXPOSURE TO STD: ICD-10-CM

## 2022-05-20 DIAGNOSIS — E53.8 B12 DEFICIENCY: ICD-10-CM

## 2022-05-20 PROCEDURE — G0439 PPPS, SUBSEQ VISIT: HCPCS | Performed by: PHYSICIAN ASSISTANT

## 2022-05-20 PROCEDURE — 1123F ACP DISCUSS/DSCN MKR DOCD: CPT | Performed by: PHYSICIAN ASSISTANT

## 2022-05-20 RX ORDER — SIMVASTATIN 20 MG
TABLET ORAL
Qty: 90 TABLET | Refills: 4 | Status: SHIPPED | OUTPATIENT
Start: 2022-05-20

## 2022-05-20 RX ORDER — AMLODIPINE BESYLATE 10 MG/1
TABLET ORAL
Qty: 90 TABLET | Refills: 4 | Status: SHIPPED | OUTPATIENT
Start: 2022-05-20

## 2022-05-20 RX ORDER — LISINOPRIL 20 MG/1
TABLET ORAL
Qty: 90 TABLET | Refills: 4 | Status: SHIPPED | OUTPATIENT
Start: 2022-05-20

## 2022-05-20 RX ORDER — IRON HEME POLYPEPTIDE/FOLIC AC 12-1MG
5000 TABLET ORAL DAILY
Qty: 90 CAPSULE | Refills: 4 | Status: SHIPPED | OUTPATIENT
Start: 2022-05-20

## 2022-05-20 SDOH — ECONOMIC STABILITY: FOOD INSECURITY: WITHIN THE PAST 12 MONTHS, THE FOOD YOU BOUGHT JUST DIDN'T LAST AND YOU DIDN'T HAVE MONEY TO GET MORE.: NEVER TRUE

## 2022-05-20 SDOH — ECONOMIC STABILITY: FOOD INSECURITY: WITHIN THE PAST 12 MONTHS, YOU WORRIED THAT YOUR FOOD WOULD RUN OUT BEFORE YOU GOT MONEY TO BUY MORE.: NEVER TRUE

## 2022-05-20 ASSESSMENT — LIFESTYLE VARIABLES: HOW OFTEN DO YOU HAVE A DRINK CONTAINING ALCOHOL: NEVER

## 2022-05-20 ASSESSMENT — SOCIAL DETERMINANTS OF HEALTH (SDOH): HOW HARD IS IT FOR YOU TO PAY FOR THE VERY BASICS LIKE FOOD, HOUSING, MEDICAL CARE, AND HEATING?: NOT HARD AT ALL

## 2022-05-20 NOTE — PATIENT INSTRUCTIONS
Personalized Preventive Plan for Shahida Stafford - 5/20/2022  Medicare offers a range of preventive health benefits. Some of the tests and screenings are paid in full while other may be subject to a deductible, co-insurance, and/or copay. Some of these benefits include a comprehensive review of your medical history including lifestyle, illnesses that may run in your family, and various assessments and screenings as appropriate. After reviewing your medical record and screening and assessments performed today your provider may have ordered immunizations, labs, imaging, and/or referrals for you. A list of these orders (if applicable) as well as your Preventive Care list are included within your After Visit Summary for your review. Other Preventive Recommendations:    · A preventive eye exam performed by an eye specialist is recommended every 1-2 years to screen for glaucoma; cataracts, macular degeneration, and other eye disorders. · A preventive dental visit is recommended every 6 months. · Try to get at least 150 minutes of exercise per week or 10,000 steps per day on a pedometer . · Order or download the FREE \"Exercise & Physical Activity: Your Everyday Guide\" from [employer]. Call 3-235.392.5089 or search [employer] online. · You need 9560-7578 mg of calcium and 7718-0192 IU of vitamin D per day. It is possible to meet your calcium requirement with diet alone, but a vitamin D supplement is usually necessary to meet this goal.  · When exposed to the sun, use a sunscreen that protects against both UVA and UVB radiation with an SPF of 30 or greater. Reapply every 2 to 3 hours or after sweating, drying off with a towel, or swimming. · Always wear a seat belt when traveling in a car. Always wear a helmet when riding a bicycle or motorcycle.

## 2022-05-20 NOTE — PROGRESS NOTES
Medicare Annual Wellness Visit    Peace Boyd is here for Medicare AWV and Follow-up (yearly, last OV 5/10/21)    Assessment & Plan   Medicare annual wellness visit, subsequent  Dyslipidemia  -     Lipid Panel; Future  -     Hemoglobin A1C; Future  -     simvastatin (ZOCOR) 20 MG tablet; take 1 tablet by mouth at bedtime, Disp-90 tablet, R-4Normal  Essential hypertension, benign  -     CBC; Future  -     Comprehensive Metabolic Panel; Future  -     lisinopril (PRINIVIL;ZESTRIL) 20 MG tablet; take 1 tablet by mouth once daily, Disp-90 tablet, R-4Normal  -     amLODIPine (NORVASC) 10 MG tablet; take 1 tablet by mouth once daily, Disp-90 tablet, R-4Normal  Abnormal CBC  Vitamin D deficiency  -     vitamin D (DIALYVITE VITAMIN D 5000) 125 MCG (5000 UT) CAPS capsule; Take 1 capsule by mouth daily, Disp-90 capsule, R-4Normal  -     Vitamin D 25 Hydroxy; Future  Prostate cancer screening  -     PSA Screening; Future  B12 deficiency  -     Vitamin B12 & Folate; Future  Blood glucose abnormal  -     Hemoglobin A1C; Future  Severe obesity (BMI 35.0-39. 9) with comorbidity ([facility] [address]  Potential exposure to STD  -     C.trachomatis N.gonorrhoeae DNA, Urine; Future  -     Trichomonas Vaginalis Rna, Qual Tma, Pap Via; Future  -     Hepatitis C Antibody; Future  -     RPR Reflex to Titer and TPPA; Future      Recommendations for Preventive Services Due: see orders and patient instructions/AVS.  Recommended screening schedule for the next 5-10 years is provided to the patient in written form: see Patient Instructions/AVS.     Return for Medicare Annual Wellness Visit in 1 year. Subjective   Patient's complete Health Risk Assessment and screening values have been reviewed and are found in Flowsheets. The following problems were reviewed today and where indicated follow up appointments were made and/or referrals ordered.     Positive Risk Factor Screenings with Interventions:               General Health and ACP:  General  In general, how would you say your health is?: Good  In the past 7 days, have you experienced any of the following: New or Increased Pain, New or Increased Fatigue, Loneliness, Social Isolation, Stress or Anger?: No  Do you get the social and emotional support that you need?: (!) No  Do you have a Living Will?: Yes    Advance Directives     Power of  Living Will ACP-Advance Directive ACP-Power of     Not on File Filed on 11/15/16 Filed Not on File      General Health Risk Interventions:  · Social isolation: patient declines any further intervention for this issue    Health Habits/Nutrition:     Physical Activity: Sufficiently Active    Days of Exercise per Week: 4 days    Minutes of Exercise per Session: 70 min     Have you lost any weight without trying in the past 3 months?: No  Body mass index: (!) 39.15  Have you seen the dentist within the past year?: (!) No    Health Habits/Nutrition Interventions:  · Dental exam overdue:  patient encouraged to make appointment with his/her dentist    Hearing/Vision:  Do you or your family notice any trouble with your hearing that hasn't been managed with hearing aids?: No  Do you have difficulty driving, watching TV, or doing any of your daily activities because of your eyesight?: (!) Yes  Have you had an eye exam within the past year?: Yes  No exam data present    Hearing/Vision Interventions:  · Vision concerns:  patient encouraged to make appointment with his/her eye specialist            Objective   Vitals:    05/20/22 1113 05/20/22 1121   BP: (!) 142/80 (!) 140/80   Site: Left Upper Arm Left Upper Arm   Position: Sitting Sitting   Cuff Size: Large Adult Large Adult   Pulse: 52    Resp: 16    SpO2: 96%    Weight: 250 lb (113.4 kg)    Height: 5' 7\" (1.702 m)       Body mass index is 39.16 kg/m². Allergies   Allergen Reactions    Apraclonidine      Other reaction(s):  Other: See Comments  Irritation, not tolerable     Brimonidine      Other reaction(s): Other: See Comments  Follicles      Prior to Visit Medications    Medication Sig Taking? Authorizing Provider   lisinopril (PRINIVIL;ZESTRIL) 20 MG tablet take 1 tablet by mouth once daily Yes Nandini Cao PA-C   vitamin D (DIALYVITE VITAMIN D 5000) 125 MCG (5000 UT) CAPS capsule Take 1 capsule by mouth daily Yes Nandini Cao PA-C   amLODIPine (NORVASC) 10 MG tablet take 1 tablet by mouth once daily Yes Nandini Cao PA-C   simvastatin (ZOCOR) 20 MG tablet take 1 tablet by mouth at bedtime Yes Nandini Cao PA-C   cyanocobalamin ([employer] VITAMIN B12) 1000 MCG tablet Take 1 tablet by mouth daily Yes Nandini Cao PA-C   sildenafil (VIAGRA) 100 MG tablet take 1 tablet by mouth if needed for ERECTILE DYSFUNCTION 60 MINUTES BEFORE SEXUAL INTERCOURSE Yes Nandini Cao PA-C   hydroCHLOROthiazide (HYDRODIURIL) 12.5 MG tablet take 1 tablet by mouth once daily Yes Nandini Cao PA-C   aspirin 81 MG EC tablet Take 1 tablet by mouth daily Yes Nandini Cao PA-C   latanoprost (XALATAN) 0.005 % ophthalmic solution Use 1 Drop in both eyes daily at bedtime. Yes Historical Provider, MD   dorzolamide-timolol (COSOPT) 22.3-6.8 MG/ML ophthalmic solution Use 1 Drop in both eyes twice daily. Yes Historical Provider, MD   Handicap Placard MISC by Does not apply route Good for 5 years; expires 5/28/2027. Nandini Cao PA-C       [facility] (Including outside providers/suppliers regularly involved in providing care):   Patient Care Team:  Yue Vora PA-C as PCP - General (Family Medicine)  Yue Vora PA-C as PCP - [facility] Empaneled Provider     Reviewed and updated this visit:  Tobacco  Allergies  Meds  Problems  Med Hx  Surg Hx  Soc Hx  Fam Hx                  Subjective  Rudolm Mcduffie, 71 y.o. male presents today with:  Chief Complaint   Patient presents with    Medicare AWV    Follow-up     yearly, last OV 5/10/21     HPI  Last OV with me: 5/10/21. Needing repeat/routine labs for monitoring.    Overall, he has been feeling well.     HTN.   Complaint with all of his medications.  Denies dizziness, CP, RICHARDSON, SOB.       Mixed hyperlipidemia.  Taking simvastatin 20 mg tablet daily.  Denies myalgia or muscleweakness. Trying to exercise (cardio).      Obesity.  Exercising daily.  Trying to lose weight.      Glaucoma.  Followed by Dr. Vanna Barry and Dr. Renee Aceves at [address] eye drops; has been compliant with therapy.      ED. Asking for refill of cialis.  Medication works well. Lennox Shanae side effects. Would like dose increase if possible. Currently taking 10 mg, PRN.       Vit D def. On replacement therapy. He is sexually active; would like STD screening today. Due for HCV screening, OK with having lab completed today. Review of Systems   Constitutional: Positive for fatigue (intermittent) and unexpected weight change. Negative for activity change, appetite change, chills, diaphoresis and fever. HENT: Negative for congestion, ear discharge, hearing loss, mouth sores, nosebleeds, sinus pressure and trouble swallowing. Eyes: Negative for photophobia, pain, redness and visual disturbance. Respiratory: Negative for cough, chest tightness, shortness of breath and wheezing. Cardiovascular: Negative for chest pain, palpitations and leg swelling. Gastrointestinal: Negative for abdominal distention, abdominal pain, blood in stool, constipation, diarrhea, nausea and rectal pain. Genitourinary: Negative for dysuria, flank pain, frequency, genital sores, penile discharge and urgency. Musculoskeletal: Negative for arthralgias, back pain and gait problem. Skin: Negative for color change and rash. Neurological: Negative for dizziness, weakness, light-headedness and headaches. Psychiatric/Behavioral: Negative for agitation, confusion, dysphoric mood and sleep disturbance. The patient is not nervous/anxious.       Past Medical History:   Diagnosis Date    Diabetes mellitus, type 2 ([facility] [address] 10/11/2011    pt relates he was borderline at one time and now denies being diabetic and relates his physician has not prescribed any meds or blood sugar monitoring    Dyslipidemia 10/11/2011    Erectile dysfunction 2012    Essential hypertension, benign 10/11/2011    Glaucoma     History of colon polyps     adenomatous polyps     Past Surgical History:   Procedure Laterality Date    [facility]17/15    [facility]    COLONOSCOPY  11    Dr Grubbs Counts    COLONOSCOPY N/A 2016    COLONOSCOPY performed by Balwinder Adame MD at [place]  N/A 2020    COLONOSCOPY WITH POLYPECTOMY performed by Serg Huber MD at [address] [facility] Right 14    [facility]     Social History     Socioeconomic History    Marital status: Single     Spouse name: Not on file    Number of children: Not on file    Years of education: Not on file    Highest education level: Not on file   Occupational History    Not on file   Tobacco Use    Smoking status: Former Smoker     Packs/day: 1.50     Years: 30.00     Pack years: 45.00     Types: Cigarettes     Quit date: 2000     Years since quittin.4    Smokeless tobacco: Never Used   Vaping Use    Vaping Use: Never used   Substance and Sexual Activity    Alcohol use: No    Drug use: Not on file    Sexual activity: Not on file   Other Topics Concern    Not on file   Social History Narrative    Not on file     Social Determinants of Health     Financial Resource Strain: Low Risk     Difficulty of Paying Living Expenses: Not hard at all   Food Insecurity: No Food Insecurity    Worried About [address] in the Last Year: Never true    [address] in the Last Year: Never true   Transportation Needs:     Lack of Transportation (Medical): Not on file    Lack of Transportation (Non-Medical):  Not on file   Physical Activity: Sufficiently Active    Days of Exercise per Week: 4 days    Minutes of Exercise per Session: 70 min   Stress:      dorzolamide-timolol (COSOPT) 22.3-6.8 MG/ML ophthalmic solution Use 1 Drop in both eyes twice daily.  Handicap Placard MISC by Does not apply route Good for 5 years; expires 5/28/2027. 1 each 0     No current facility-administered medications for this visit. PMH, Surgical Hx, Family Hx, and Social Hx reviewed and updated. Health Maintenance reviewed. Objective  Vitals:    05/20/22 1113 05/20/22 1121   BP: (!) 142/80 (!) 140/80   Site: Left Upper Arm Left Upper Arm   Position: Sitting Sitting   Cuff Size: Large Adult Large Adult   Pulse: 52    Resp: 16    SpO2: 96%    Weight: 250 lb (113.4 kg)    Height: 5' 7\" (1.702 m)      BP Readings from Last 3 Encounters:   05/20/22 (!) 140/80   03/25/22 138/80   09/15/21 138/84     Wt Readings from Last 3 Encounters:   05/20/22 250 lb (113.4 kg)   03/25/22 257 lb (116.6 kg)   09/15/21 257 lb 12.8 oz (116.9 kg)     Physical Exam  Constitutional:       General: He is not in acute distress. Appearance: He is well-developed. He is obese. He is not diaphoretic. Comments: Obese male. Sitting comfortably in exam room. In a great mood. HENT:      Head: Normocephalic and atraumatic. Right Ear: Hearing, tympanic membrane, ear canal and external ear normal.      Left Ear: Hearing, tympanic membrane, ear canal and external ear normal.      Nose: Nose normal.      Mouth/Throat:      Pharynx: No oropharyngeal exudate. Eyes:      Conjunctiva/sclera: Conjunctivae normal.      Comments: Wearing glasses. Cardiovascular:      Rate and Rhythm: Normal rate and regular rhythm. Heart sounds: Normal heart sounds. No murmur heard. Pulmonary:      Effort: Pulmonary effort is normal. No respiratory distress. Breath sounds: Normal breath sounds. No wheezing or rales. Musculoskeletal:      Cervical back: Normal range of motion and neck supple. Lymphadenopathy:      Cervical: No cervical adenopathy. Skin:     General: Skin is warm and dry. Neurological:      Mental Status: He is alert and oriented to person, place, and time. Cranial Nerves: No cranial nerve deficit. Psychiatric:         Attention and Perception: Attention normal.         Mood and Affect: Mood and affect normal.         Speech: Speech normal.         Behavior: Behavior normal.         Thought Content: Thought content normal.         Cognition and Memory: Cognition normal.         Judgment: Judgment normal.      Comments: Retired from [employer]. Family lives in [place]. Likes living in [place]. Assessment & Plan   Fredrick Calderon was seen today for medicare awv and follow-up. Diagnoses and all orders for this visit:    Medicare annual wellness visit, subsequent    Dyslipidemia  -     Lipid Panel; Future  -     Hemoglobin A1C; Future  -     simvastatin (ZOCOR) 20 MG tablet; take 1 tablet by mouth at bedtime    Essential hypertension, benign  -     CBC; Future  -     Comprehensive Metabolic Panel; Future  -     lisinopril (PRINIVIL;ZESTRIL) 20 MG tablet; take 1 tablet by mouth once daily  -     amLODIPine (NORVASC) 10 MG tablet; take 1 tablet by mouth once daily    Abnormal CBC    Vitamin D deficiency  -     vitamin D (DIALYVITE VITAMIN D 5000) 125 MCG (5000 UT) CAPS capsule; Take 1 capsule by mouth daily  -     Vitamin D 25 Hydroxy; Future    Prostate cancer screening  -     PSA Screening; Future    B12 deficiency  -     Vitamin B12 & Folate; Future    Blood glucose abnormal  -     Hemoglobin A1C; Future    Severe obesity (BMI 35.0-39. 9) with comorbidity ([facility] [address]    Potential exposure to STD  -     C.trachomatis N.gonorrhoeae DNA, Urine; Future  -     Trichomonas Vaginalis Rna, Qual Tma, Pap Via; Future  -     Hepatitis C Antibody; Future  -     RPR Reflex to Titer and TPPA; Future    Labs today. Follow up with me in 6 months for routine f/u. Did not take BP medications today. Will monitor.      Orders Placed This Encounter   Procedures    C.trachomatis N.gonorrhoeae DNA, Urine Standing Status:   Future     Number of Occurrences:   1     Standing Expiration Date:   5/20/2023    Trichomonas Vaginalis Rna, Qual Tma, Pap Via     Standing Status:   Future     Standing Expiration Date:   5/20/2023    Lipid Panel     Standing Status:   Future     Number of Occurrences:   1     Standing Expiration Date:   5/20/2023     Order Specific Question:   Is Patient Fasting?/# of Hours     Answer:   yes    CBC     Standing Status:   Future     Number of Occurrences:   1     Standing Expiration Date:   5/20/2023    Comprehensive Metabolic Panel     Standing Status:   Future     Number of Occurrences:   1     Standing Expiration Date:   5/20/2023    PSA Screening     Standing Status:   Future     Number of Occurrences:   1     Standing Expiration Date:   5/20/2023    Vitamin B12 & Folate     Standing Status:   Future     Number of Occurrences:   1     Standing Expiration Date:   5/20/2023    Hemoglobin A1C     Standing Status:   Future     Number of Occurrences:   1     Standing Expiration Date:   5/20/2023    Hepatitis C Antibody     Standing Status:   Future     Number of Occurrences:   1     Standing Expiration Date:   5/20/2023    RPR Reflex to Titer and TPPA     Standing Status:   Future     Number of Occurrences:   1     Standing Expiration Date:   5/20/2023    Vitamin D 25 Hydroxy     Standing Status:   Future     Number of Occurrences:   1     Standing Expiration Date:   5/20/2023     Orders Placed This Encounter   Medications    lisinopril (PRINIVIL;ZESTRIL) 20 MG tablet     Sig: take 1 tablet by mouth once daily     Dispense:  90 tablet     Refill:  4    vitamin D (DIALYVITE VITAMIN D 5000) 125 MCG (5000 UT) CAPS capsule     Sig: Take 1 capsule by mouth daily     Dispense:  90 capsule     Refill:  4    amLODIPine (NORVASC) 10 MG tablet     Sig: take 1 tablet by mouth once daily     Dispense:  90 tablet     Refill:  4    simvastatin (ZOCOR) 20 MG tablet     Sig: take 1 tablet by mouth at bedtime     Dispense:  90 tablet     Refill:  4     Medications Discontinued During This Encounter   Medication Reason    amLODIPine (NORVASC) 10 MG tablet REORDER    simvastatin (ZOCOR) 20 MG tablet REORDER    vitamin D (DIALYVITE VITAMIN D 5000) 125 MCG (5000 UT) CAPS capsule REORDER    lisinopril (PRINIVIL;ZESTRIL) 20 MG tablet REORDER     Return for Medicare Annual Wellness Visit in 1 year. Reviewed with the patient: current clinical status, medications, activities and diet. Side effects, adverse effects of the medication prescribed today, as well as treatment plan/ rationale and result expectations have been discussed with the patient who expresses understanding and desires to proceed. Close follow up to evaluate treatment results and for coordination of care. I have reviewed the patient's medical history in detail and updated the computerized patient record.     Nandini Cao PA-C

## 2022-05-21 DIAGNOSIS — E55.9 VITAMIN D DEFICIENCY: ICD-10-CM

## 2022-05-21 DIAGNOSIS — R73.09 BLOOD GLUCOSE ABNORMAL: ICD-10-CM

## 2022-05-21 DIAGNOSIS — I10 ESSENTIAL HYPERTENSION, BENIGN: ICD-10-CM

## 2022-05-21 DIAGNOSIS — E78.5 DYSLIPIDEMIA: ICD-10-CM

## 2022-05-21 DIAGNOSIS — Z12.5 PROSTATE CANCER SCREENING: ICD-10-CM

## 2022-05-21 DIAGNOSIS — Z20.2 POTENTIAL EXPOSURE TO STD: ICD-10-CM

## 2022-05-21 DIAGNOSIS — E53.8 B12 DEFICIENCY: ICD-10-CM

## 2022-05-21 LAB
ALBUMIN SERPL-MCNC: 4 G/DL (ref 3.5–4.6)
ALP BLD-CCNC: 71 U/L (ref 35–104)
ALT SERPL-CCNC: 9 U/L (ref 0–41)
ANION GAP SERPL CALCULATED.3IONS-SCNC: 14 MEQ/L (ref 9–15)
AST SERPL-CCNC: 13 U/L (ref 0–40)
BILIRUB SERPL-MCNC: 0.6 MG/DL (ref 0.2–0.7)
BUN BLDV-MCNC: 15 MG/DL (ref 8–23)
CALCIUM SERPL-MCNC: 10 MG/DL (ref 8.5–9.9)
CHLORIDE BLD-SCNC: 104 MEQ/L (ref 95–107)
CHOLESTEROL, TOTAL: 160 MG/DL (ref 0–199)
CO2: 22 MEQ/L (ref 20–31)
CREAT SERPL-MCNC: 0.87 MG/DL (ref 0.7–1.2)
FOLATE: >20 NG/ML
GFR AFRICAN AMERICAN: >60
GFR NON-AFRICAN AMERICAN: >60
GLOBULIN: 3 G/DL (ref 2.3–3.5)
GLUCOSE BLD-MCNC: 110 MG/DL (ref 70–99)
HBA1C MFR BLD: 4.6 % (ref 4.8–5.9)
HCT VFR BLD CALC: 41.6 % (ref 42–52)
HDLC SERPL-MCNC: 45 MG/DL (ref 40–59)
HEMOGLOBIN: 13.2 G/DL (ref 14–18)
HEPATITIS C ANTIBODY: REACTIVE
LDL CHOLESTEROL CALCULATED: 103 MG/DL (ref 0–129)
MCH RBC QN AUTO: 28.8 PG (ref 27–31.3)
MCHC RBC AUTO-ENTMCNC: 31.7 % (ref 33–37)
MCV RBC AUTO: 90.7 FL (ref 80–100)
PDW BLD-RTO: 13.1 % (ref 11.5–14.5)
PLATELET # BLD: 242 K/UL (ref 130–400)
POTASSIUM SERPL-SCNC: 3.8 MEQ/L (ref 3.4–4.9)
PROSTATE SPECIFIC ANTIGEN: 3.7 NG/ML (ref 0–4)
RBC # BLD: 4.59 M/UL (ref 4.7–6.1)
SODIUM BLD-SCNC: 140 MEQ/L (ref 135–144)
TOTAL PROTEIN: 7 G/DL (ref 6.3–8)
TRIGL SERPL-MCNC: 61 MG/DL (ref 0–150)
VITAMIN B-12: 1308 PG/ML (ref 232–1245)
VITAMIN D 25-HYDROXY: 33.5 NG/ML
WBC # BLD: 5 K/UL (ref 4.8–10.8)

## 2022-05-22 LAB — RPR: NORMAL

## 2022-05-24 DIAGNOSIS — R76.8 POSITIVE HEPATITIS C ANTIBODY TEST: Primary | ICD-10-CM

## 2022-05-25 ENCOUNTER — TELEPHONE (OUTPATIENT)
Dept: FAMILY MEDICINE CLINIC | Age: 72
End: 2022-05-25

## 2022-05-25 DIAGNOSIS — E66.01 MORBID OBESITY WITH BMI OF 40.0-44.9, ADULT (HCC): Primary | ICD-10-CM

## 2022-05-25 NOTE — TELEPHONE ENCOUNTER
Patient is requesting a handicap placard because he states that it is difficult for him to walk far at the store. Patient states that if he cannot that he is okay with it too.      Please advise and thank you

## 2022-05-26 LAB
C. TRACHOMATIS DNA ,URINE: NEGATIVE
N. GONORRHOEAE DNA, URINE: NEGATIVE

## 2022-06-05 ASSESSMENT — ENCOUNTER SYMPTOMS
SHORTNESS OF BREATH: 0
BLOOD IN STOOL: 0
CHEST TIGHTNESS: 0
ABDOMINAL PAIN: 0
PHOTOPHOBIA: 0
DIARRHEA: 0
SINUS PRESSURE: 0
RECTAL PAIN: 0
WHEEZING: 0
COUGH: 0
ABDOMINAL DISTENTION: 0
COLOR CHANGE: 0
BACK PAIN: 0
NAUSEA: 0
TROUBLE SWALLOWING: 0
EYE REDNESS: 0
EYE PAIN: 0
CONSTIPATION: 0

## 2022-08-18 DIAGNOSIS — N52.2 DRUG-INDUCED ERECTILE DYSFUNCTION: ICD-10-CM

## 2022-08-18 RX ORDER — SILDENAFIL 100 MG/1
TABLET, FILM COATED ORAL
Qty: 18 TABLET | Refills: 2 | Status: SHIPPED | OUTPATIENT
Start: 2022-08-18

## 2022-08-19 DIAGNOSIS — I10 ESSENTIAL HYPERTENSION, BENIGN: ICD-10-CM

## 2022-08-19 DIAGNOSIS — Z76.0 MEDICATION REFILL: ICD-10-CM

## 2022-08-19 RX ORDER — HYDROCHLOROTHIAZIDE 12.5 MG/1
TABLET ORAL
Qty: 90 TABLET | Refills: 1 | Status: SHIPPED | OUTPATIENT
Start: 2022-08-19

## 2022-08-19 NOTE — TELEPHONE ENCOUNTER
pharmacy requesting medication refill. Please approve or deny this request.    Rx requested:  Requested Prescriptions     Pending Prescriptions Disp Refills    hydroCHLOROthiazide (HYDRODIURIL) 12.5 MG tablet [Pharmacy Med Name: HYDROCHLOROTHIAZIDE 12.5 MG TB] 90 tablet 1     Sig: take 1 tablet by mouth once daily         Last Office Visit:   5/20/2022      Next Visit Date:  No future appointments.

## 2022-08-29 ENCOUNTER — TELEPHONE (OUTPATIENT)
Dept: FAMILY MEDICINE CLINIC | Age: 72
End: 2022-08-29

## 2022-08-29 NOTE — TELEPHONE ENCOUNTER
He is having eye surgery, PAT at [facility] did the pre-op today, and it looks like an EKG was ordered?

## 2022-08-29 NOTE — TELEPHONE ENCOUNTER
Pt present here today stating that he needs an EKG for surgery that's on 9/13/22. He didn't give me to much information about anything. He did ask if PCP can put in a order for one if possible.

## 2022-10-17 ENCOUNTER — TELEPHONE (OUTPATIENT)
Dept: FAMILY MEDICINE CLINIC | Age: 72
End: 2022-10-17

## 2022-10-17 NOTE — TELEPHONE ENCOUNTER
Patients states that he needs help around the house in general, taking his medication properly, checking mail, etc.

## 2022-10-17 NOTE — TELEPHONE ENCOUNTER
Patient had eye surgery and would like to get paper work started for home health care. [facility] told patient he needed to have this done with his PCP.     Call back # [phone]

## 2022-10-18 ENCOUNTER — CARE COORDINATION (OUTPATIENT)
Dept: CARE COORDINATION | Age: 72
End: 2022-10-18

## 2022-10-18 NOTE — CARE COORDINATION
Telephone call to [facility]OR[facility] on Aging/Housekeeping. Put patient's name on waiting list for housekeeping. Left voicemail for [facility] on Aging with referral information for patient. Left phone number to call with any questions or concerns. Completed [facility] referral and received confirmation that referral was received.

## 2022-10-18 NOTE — CARE COORDINATION
Initial Contact Social Work Note - Ambulatory  10/18/2022      Date of referral: 10/18/2022  Referral received from: Frannie Vásquez PA-C  Reason for referral: Needing help at home. Previous SW referral: No  If yes, brief summary of outcome:     Two Identifiers Verified: Yes    [facility]  Neighbor(s)    Omro Status: Not a [employer]:  None     ADL Assistance Needed:  Independent  with ADL'S    Housing/Living Concerns or Home Modification Needs:      Transportation Concern: Patient stated that he called [employer] and they do not have transportation benefit. He was told that Jennifer Mcmahon would give a discount for transportation. Jennifer Mcmahon gave him a phone number to call regarding transportation and discount. Provided patient  with contact information for [facility]. Also due to vision issue discussed plan to make transportation referral.  Patient stated that he is going to need transportation to [employer] for additional surgery and will need transportation. He is unsure when this surgery will be scheduled. Discussed could give him resources for transportation to [employer] but her would have to pay for it. Patient stated that he needs help with fixing him meals  and cleaning up household. Medication Cost Concern: No problems affording medications. Medication Adherence Concern:     Financial Concern(s): Income (only if applicable):     Ability to Read/Write: Yes    Advance Care Plan:   None    Other: Patient stated that he is going to need additional eye surgery and is having difficulty seeing and doing things around the house. Discussed options of private hire and housekeeping with [facility][1] on [address]. Patient does not feel he can afford to hire assistance at home. Discussed plan to make housekeeping referral to [facility][1] on [facility]. Discussed meals-on-wheels and he does not eat pork and beef.   He does not feels meals-on-wheels would be able to meet his dietary needs. Identified Needs:  Housekeeping . Meal preparation and household assistance. Transportation    Social Work Plan:  [employer] on [employer] and transportation referral.  P[employer]ORT referral    Next Steps: Complete housekeeping and transportation. referrals with [employer] on Aging.   Complete PASSPORT referral.    Method of Communication With Provider (if appropriate): None       Goals Addressed    None

## 2022-10-19 ENCOUNTER — CARE COORDINATION (OUTPATIENT)
Dept: CARE COORDINATION | Age: 72
End: 2022-10-19

## 2022-10-19 NOTE — CARE COORDINATION
Telephone call with Shaniqua Long RN / [employer]. Requested  referral for patient. Explained he is having great difficulty managing at home. [facility] hasked that this writer let patient know that [address] [place] [address]  will be calling him for case management services.

## 2022-10-26 ENCOUNTER — CARE COORDINATION (OUTPATIENT)
Dept: CARE COORDINATION | Age: 72
End: 2022-10-26

## 2022-10-26 NOTE — CARE COORDINATION
Telephone call to Agata/[place]/Nurse . Left voicemail explaining patient will be having eye surgery tomorrow and maybe best to reach him another day. Provided call back number in message.

## 2022-10-26 NOTE — CARE COORDINATION
Telephone call to [address] [place] [address]. Left voicemail of purpose of call with request for return phone call. Call back number was provided.

## 2022-10-26 NOTE — CARE COORDINATION
Telephone call with patient. Explained that Cindi did reach out to him last week and left a message. Explained she would be reaching out to him tomorrow. Patient reminded this writer that he will be having eye surgery tomorrow. Discussed would give Seven Dooley a call and let her know of his eye surgery.

## 2022-10-26 NOTE — CARE COORDINATION
Telephone call with patient. He stated that he did hear from [employer] on Aging/PASSPORT and was told that his income was too high. He did find a ride to [employer] and had to pay someone. He has surgery tomorrow and has a ride that he is paying for. He needs a ride on Friday. He called [employer] and was told it would be 150 dollars for roundtrip. He also called Lyft and was the same amount. He feels he can find a cheaper way for transportation. on Friday. He is feeling that he will have to pay someone to get another ride to [employer]. He is paying someone to help with housekeeping. He stated that he has not heard from [employer]/Case Management.

## 2022-10-27 ENCOUNTER — CARE COORDINATION (OUTPATIENT)
Dept: CARE COORDINATION | Age: 72
End: 2022-10-27

## 2022-10-27 NOTE — CARE COORDINATION
Returning Agata/nurse /[place]. She indicated in voicemail that she did reach out to patient today and will be trying him again tomorrow/Friday. Julissa Bucio asking for additional number for him. Telephone call to Julissa Bucio and left patient's contact information on voicemail.

## 2022-10-28 ENCOUNTER — CARE COORDINATION (OUTPATIENT)
Dept: CARE COORDINATION | Age: 72
End: 2022-10-28

## 2022-10-28 NOTE — CARE COORDINATION
Telephone call to patient. Provided patient with contact information for Cindi and encouraged him to call her.

## 2022-10-28 NOTE — CARE COORDINATION
Telephone call with Agata//[place]. She indicated that she still has not been able to reach patient. Provided Lexie Mills with additional contact information for patient. She expressed plan to reach out to patient next week. She also stated that she will be discussing with patient  a referral to their social workers. Provided Lexie Mills with update on referral to [facility] and [facility] on Aging.

## 2022-11-02 ENCOUNTER — CARE COORDINATION (OUTPATIENT)
Dept: CARE COORDINATION | Age: 72
End: 2022-11-02

## 2022-11-02 NOTE — CARE COORDINATION
Telephone call with Agata/[place]/. She indicated that patient's [place] fallon was terminated on 10/31/2022. She is going to do some investigation about this and get back to this writer. She has also placed a call to patient and waiting to hear back from him.

## 2022-11-02 NOTE — CARE COORDINATION
Telephone call with Agata/[place]/. She spoke to member services and patient terminated his coverage with [employer] as of 10/31/2022. He chose a [employer] Complete Plan.

## 2022-11-03 ENCOUNTER — CARE COORDINATION (OUTPATIENT)
Dept: CARE COORDINATION | Age: 72
End: 2022-11-03

## 2022-11-03 NOTE — CARE COORDINATION
Telephone call with patient. He spoke to [employer] and [facility]  and was told he still has [employer]. His insurance is changing on 1/2023/[facility]. He is paying for someone to help him, laundry and housekeeping. He is paying his neighbors to take him to the eye doctors. Reviewed he is not interested in meals-on-wheels due to his dietary restrictions. He stated that his vision is still poor.

## 2022-11-03 NOTE — CARE COORDINATION
Telephone  call to Agata/[place]/Case Management. Left voicemail of purpose of call with request for return phone call. Call back number was provided.

## 2022-11-07 ENCOUNTER — SCHEDULED TELEPHONE ENCOUNTER (OUTPATIENT)
Dept: FAMILY MEDICINE CLINIC | Age: 72
End: 2022-11-07
Payer: MEDICARE

## 2022-11-07 DIAGNOSIS — H26.20 CATARACT WITH COMPLICATION OF BOTH EYES, UNSPECIFIED COMPLICATED CATARACT TYPE: Primary | ICD-10-CM

## 2022-11-07 DIAGNOSIS — H53.9 VISION CHANGES: ICD-10-CM

## 2022-11-07 PROCEDURE — 1123F ACP DISCUSS/DSCN MKR DOCD: CPT | Performed by: PHYSICIAN ASSISTANT

## 2022-11-07 PROCEDURE — 99213 OFFICE O/P EST LOW 20 MIN: CPT | Performed by: PHYSICIAN ASSISTANT

## 2022-11-07 RX ORDER — ATROPINE SULFATE 10 MG/ML
SOLUTION/ DROPS OPHTHALMIC
COMMUNITY
Start: 2022-10-10

## 2022-11-07 RX ORDER — CIPROFLOXACIN HYDROCHLORIDE 3.5 MG/ML
SOLUTION/ DROPS TOPICAL
COMMUNITY
Start: 2022-11-01

## 2022-11-07 RX ORDER — PREDNISOLONE ACETATE 10 MG/ML
1 SUSPENSION/ DROPS OPHTHALMIC
COMMUNITY
Start: 2022-09-13

## 2022-11-07 ASSESSMENT — ENCOUNTER SYMPTOMS
PHOTOPHOBIA: 1
EYE ITCHING: 0
EYE REDNESS: 0
EYE PAIN: 0
COLOR CHANGE: 0

## 2022-11-07 NOTE — PROGRESS NOTES
2022    TELEHEALTH EVALUATION -- Audio/Visual (During [id number] public health emergency)    Due to COVID 19 outbreak, patient's office visit was converted to a virtual visit. Patient was contacted and agreed to proceed with a virtual visit via Telephone Visit--total length of call 15-20 minutes  The risks and benefits of converting to a virtual visit were discussed in light of the current infectious disease epidemic. Patient also understood that insurance coverage and co-pays are up to their individual insurance plans. HPI:    Rhona Pollock (:  1950) has requested an audio/video evaluation for the following concern(s):    VV today due to recent vision concerns. Last OV with me: 22    Vision changes. Has been having vision problems; following with [facility] ophthalmology. Had eye surgery with Dr. Karen Haynes on 10/27/22. With vision problems, he is having a hard time with household tasks/chores. Asking for HHA. To help with basic ADLs, light housework. Needing referral for [facility] evaluation. Review of Systems   Constitutional:  Positive for activity change. Negative for appetite change, chills, diaphoresis and fatigue. Eyes:  Positive for photophobia and visual disturbance. Negative for pain, redness and itching. Skin:  Negative for color change. Neurological:  Negative for dizziness, light-headedness and headaches. Psychiatric/Behavioral:  Negative for sleep disturbance. Prior to Visit Medications    Medication Sig Taking?  Authorizing Provider   atropine 1 % ophthalmic solution  Yes Historical Provider, MD   ciprofloxacin (CILOXAN) 0.3 % ophthalmic solution instill 1 drop into left eye every 2 hours while awake Yes Historical Provider, MD   prednisoLONE acetate (PRED FORTE) 1 % ophthalmic suspension 1 drop Yes Historical Provider, MD   hydroCHLOROthiazide (HYDRODIURIL) 12.5 MG tablet take 1 tablet by mouth once daily Yes Nandini Cao PA-C   sildenafil (VIAGRA) 100 MG tablet take 1 tablet by mouth if needed for ERECTILE DYSFUNCTION 60 MINUTES BEFORE SEXUAL INTERCOURSE Yes Nandini Cao PA-C   Handicap Placard MISC by Does not apply route Good for 5 years; expires 2027. Yes Nandini Cao PA-C   lisinopril (PRINIVIL;ZESTRIL) 20 MG tablet take 1 tablet by mouth once daily Yes Nandini Cao PA-C   vitamin D (DIALYVITE VITAMIN D 5000) 125 MCG (5000 UT) CAPS capsule Take 1 capsule by mouth daily Yes Nandini Cao PA-C   amLODIPine (NORVASC) 10 MG tablet take 1 tablet by mouth once daily Yes Nandini Cao PA-C   simvastatin (ZOCOR) 20 MG tablet take 1 tablet by mouth at bedtime Yes Nandini Cao PA-C   cyanocobalamin ([employer]S VITAMIN B12) 1000 MCG tablet Take 1 tablet by mouth daily Yes Nandini Cao PA-C   aspirin 81 MG EC tablet Take 1 tablet by mouth daily Yes Nandini Cao PA-C   latanoprost (XALATAN) 0.005 % ophthalmic solution Use 1 Drop in both eyes daily at bedtime. Yes Historical Provider, MD   dorzolamide-timolol (COSOPT) 22.3-6.8 MG/ML ophthalmic solution Use 1 Drop in both eyes twice daily. Yes Historical Provider, MD       Social History     Tobacco Use    Smoking status: Former     Packs/day: 1.50     Years: 30.00     Pack years: 45.00     Types: Cigarettes     Quit date: 2000     Years since quittin.8    Smokeless tobacco: Never   Vaping Use    Vaping Use: Never used   Substance Use Topics    Alcohol use: No        Allergies   Allergen Reactions    Apraclonidine      Other reaction(s): Other: See Comments  Irritation, not tolerable     Brimonidine      Other reaction(s):  Other: See Comments  Follicles    ,   Past Medical History:   Diagnosis Date    Diabetes mellitus, type 2 ([facility] [address] 10/11/2011    pt relates he was borderline at one time and now denies being diabetic and relates his physician has not prescribed any meds or blood sugar monitoring    Dyslipidemia 10/11/2011    Erectile dysfunction 2012    Essential hypertension, benign 10/11/2011 Glaucoma     History of colon polyps     adenomatous polyps   ,   Past Surgical History:   Procedure Laterality Date    CATARACT REMOVAL Right 2/17/15    [facility]    COLONOSCOPY  11    Dr Jose Xiong    COLONOSCOPY N/A 2016    COLONOSCOPY performed by Rashida Phillips [facility] at [facility]    COLONOSCOPY N/A 2020    COLONOSCOPY WITH POLYPECTOMY performed by Silvio Staples MD at [address] [place] [address] Right 14    [facility]   ,   Social History     Tobacco Use    Smoking status: Former     Packs/day: 1.50     Years: 30.00     Pack years: 45.00     Types: Cigarettes     Quit date: 2000     Years since quittin.8    Smokeless tobacco: Never   Vaping Use    Vaping Use: Never used   Substance Use Topics    Alcohol use: No   , No family history on file.,   Immunization History   Administered Date(s) Administered    COVID-19, MODERNA BLUE border, Primary or Immunocompromised, (age 12y+), IM, 100 mcg/0.5mL 06/15/2021    COVID-19, [place] Vaccine, Vaccine Unspecified 2021, 2021    Influenza 10/18/2012    Influenza Vaccine, unspecified formulation 2016    Influenza Virus Vaccine 2015    Influenza, AFLURIA (age 1 yrs+), FLUZONE, (age 10 mo+), MDV, 0.5mL 11/10/2020    Influenza, FLUAD, (age 72 y+), Adjuvanted, 0.5mL 09/15/2021    Influenza, High Dose (Fluzone 65 yrs and older) 2016    Influenza, Triv, inactivated, subunit, adjuvanted, IM (Fluad 65 yrs and older) 10/13/2017, 10/13/2018, 08/15/2019    Pneumococcal Conjugate 13-valent ([place]) 2017, 2019   ,   Health Maintenance   Topic Date Due    AAA screen  Never done    COVID-19 Vaccine (4 - Booster) 2022    Flu vaccine (1) 2022    Depression Screen  2023    Lipids  2023    Annual Wellness Visit (AWV)  2023    Colorectal Cancer Screen  2025    DTaP/Tdap/Td vaccine (2 - Td or Tdap) 2032    Shingles vaccine  Completed    Pneumococcal 65+ years Vaccine  Completed Hepatitis C screen  Completed    Hepatitis A vaccine  Aged Out    Hib vaccine  Aged Out    Meningococcal (ACWY) vaccine  Aged Out       PHYSICAL EXAMINATION:  [ INSTRUCTIONS:  \"[x]\" Indicates a positive item  \"[]\" Indicates a negative item  -- DELETE ALL ITEMS NOT EXAMINED]  [x] Alert  [x] Oriented to person/place/time    [] No apparent distress  [] Toxic appearing    [] Face flushed appearing [] Sclera clear  [] Lips are cyanotic      [x] Breathing appears normal  [] Appears tachypneic      [] Rash on visible skin    [] Cranial Nerves II-XII grossly intact    [] Motor grossly intact in visible upper extremities    [] Motor grossly intact in visible lower extremities    [] Normal Mood  [] Anxious appearing    [] Depressed appearing  [] Confused appearing      [] Poor short term memory  [] Poor long term memory    [] OTHER:      Due to this being a TeleHealth encounter, evaluation of the following organ systems is limited: Vitals/Constitutional/EENT/Resp/CV/GI//MS/Neuro/Skin/Heme-Lymph-Imm. ASSESSMENT/PLAN:  1. Vision changes    - Internal Referral to Home Health    2. Cataract with complication of both eyes, unspecified complicated cataract type    - Internal Referral to [address] [place] [address] William Martinez Burak    Patient would benefit from [place] [address] Aid due to vision changes and inability to complete home ADLs/tasks. Vision is very limited and he is having a hard time getting mild cleaning/tasks around the house complete. He would greatly benefit from someone helping at home for him. Discussed at length today. No follow-ups on file. An  electronic signature was used to authenticate this note.     --Sergei Jason PA-C on 11/7/2022 at 2:31 PM        Pursuant to the emergency declaration under the [facility], [address] [place] [address] and the [employer] Act, this Virtual  Visit was conducted, with patient's consent, to reduce the patient's risk of exposure to COVID-19 and provide continuity of care for an established patient. Services were provided through a video synchronous discussion virtually to substitute for in-person clinic visit.

## 2022-11-08 ENCOUNTER — TELEPHONE (OUTPATIENT)
Dept: FAMILY MEDICINE CLINIC | Age: 72
End: 2022-11-08

## 2022-11-08 ENCOUNTER — CARE COORDINATION (OUTPATIENT)
Dept: CARE COORDINATION | Age: 72
End: 2022-11-08

## 2022-11-08 DIAGNOSIS — Z78.9 ACTIVITY OF DAILY LIVING ALTERATION: ICD-10-CM

## 2022-11-08 DIAGNOSIS — H53.9 VISION CHANGES: ICD-10-CM

## 2022-11-08 DIAGNOSIS — H26.20 CATARACT WITH COMPLICATION OF BOTH EYES, UNSPECIFIED COMPLICATED CATARACT TYPE: Primary | ICD-10-CM

## 2022-11-08 NOTE — TELEPHONE ENCOUNTER
[facility] states they do not provide household activities. They are suggesting Mikael Doyle.

## 2022-11-08 NOTE — PROGRESS NOTES
Marilyn Shaikh states they do  not provide household assistance. They are suggesting Mick Lowe or Osito Kennedy?

## 2022-11-09 NOTE — TELEPHONE ENCOUNTER
Talked to patient wants to do [facility], will need a referral for them. I gave the patient were they are located and phone number.

## 2022-11-12 NOTE — TELEPHONE ENCOUNTER
I'll place referral [facility] please print and fax to them.

## 2022-11-14 ENCOUNTER — TELEPHONE (OUTPATIENT)
Dept: FAMILY MEDICINE CLINIC | Age: 72
End: 2022-11-14

## 2022-11-14 NOTE — TELEPHONE ENCOUNTER
[facility] sent a fax stating that they are unable to take this patient referral at this time because they do not have the home health aides to take on any new clients.

## 2022-11-15 ENCOUNTER — TELEPHONE (OUTPATIENT)
Dept: FAMILY MEDICINE CLINIC | Age: 72
End: 2022-11-15

## 2022-11-15 ENCOUNTER — CARE COORDINATION (OUTPATIENT)
Dept: CARE COORDINATION | Age: 72
End: 2022-11-15

## 2022-11-15 NOTE — TELEPHONE ENCOUNTER
----- Message from MONI Lucas sent at 11/15/2022 11:03 AM EST -----  Regarding: [address]  I am working on his [address] needs. [address] Evie Nancie is working on accepting patient which is in network for his insurance. He has new insurance [facility] as of 11/1/2022. [employer]/which does prior authorization for his insurance is going to be faxing you a prior authorization form so if you could look for it and complete and return. [employer] phone number is [phone] and fac is [phone]. They sent the fax this AM let me know if you don't receive it. Thanks Lino Come any questions give me a call at [phone].

## 2022-11-15 NOTE — CARE COORDINATION
Telephone call to [facility] . Representative indicated they do not cover [place]. She provided phone number for another [facility] [phone]. Telephone call to Ishmael/María/Berta. They do cover [place]. She requested that information be faxed to her [phone]. Information was faxed to Juan Ramon. Physician would need to do prior authorization with insurance.

## 2022-11-15 NOTE — CARE COORDINATION
Telephone call to Prior Authorization/[facility].   Representative gave phone number of [place] ([phone])

## 2022-11-15 NOTE — CARE COORDINATION
Telephone call to [employer]. Patient  was called and gave permission for this writer to speak with this writer and case management referral.  Representative indicated that  he does not . Physician would need to submit prior authorization to fax  [phone] or phone [phone] for home health care. Representative sent  email with list of home health care agencies covered by [facility]. Representative also provided nurse line for [facility] [phone].

## 2022-11-15 NOTE — CARE COORDINATION
Telephone call to Nurse line/[facility]. Made joint call with patient on the line. Patient expressed his needs at home around the house. Patient gave another phone number( [phone]). Nurse could not provide coordination of services in the home. Discussed that his  health plan  is [facility] became active 11/1/2022.

## 2022-11-15 NOTE — CARE COORDINATION
Telephone with [facility]. They do cover [facility]. Representative indicated that they have a staff shortage of HHA and would not be able to service patient.

## 2022-11-15 NOTE — CARE COORDINATION
Telephone call to [facility]. Representative indicated that prior authorization  can be faxed [phone] or called to them at [phone]. Author Valeria Eason expressed plan to fax prior authorization to [facility] ANNETTE MELENDEZ. In basket Message sent to [facility] ANNETTE MELENDEZ explaining would be receiving prior authorization from [facility].

## 2022-11-16 ENCOUNTER — CARE COORDINATION (OUTPATIENT)
Dept: CARE COORDINATION | Age: 72
End: 2022-11-16

## 2022-11-16 NOTE — CARE COORDINATION
Telephone call to [facility]. Message indicated that number called has been temporarily  disconnected.

## 2022-11-16 NOTE — CARE COORDINATION
Telephone call to [facility]. Left message for  Bae Hans, explaining patient's HHA needs, insurance and area where he lives . Requested return phone call. Call back number was provided,.

## 2022-11-16 NOTE — CARE COORDINATION
Telephone call to [facility]. They do take patient's insurance. They requested that information be faxed to them at [phone]. Information was faxed to Edna.

## 2022-11-17 ENCOUNTER — CARE COORDINATION (OUTPATIENT)
Dept: CARE COORDINATION | Age: 72
End: 2022-11-17

## 2022-11-17 NOTE — CARE COORDINATION
Telephone call to [facility].  They indicated they would only work with long term care policies and not [facility].

## 2022-11-17 NOTE — CARE COORDINATION
Telephone call to [facility]. Told patient is not [employer] and need to call [address]. Telephone call to [facility]. Spoke with advocate and asking about case management. services. Explained none of the home care agencies could not help patient. Advocate gave phone number case management [facility] ([phone]). Telephone call to this number with [facility]. Spoke with another advocate and asking for case management. Advocate indicated that they do not have case management services. Advocate to have someone call this writer back, and wanting a procedure code, which this writer did not have. Advocate transferred to [facility] and spoke representative. Representative indicated that  home care agency needs to initial assessment , then the agency submits an authorization on behalf of the patient to Michele Bee. She indicated can send form to PCP but without patient been seen by Jessica Peterson the referral is not processed. Explained got a list of agencies from [facility] and cannot locate an agency on list that can help patient. Representative indicated could look at out of network providers but that conversation  need to be done with [facility].

## 2022-11-17 NOTE — CARE COORDINATION
Telephone call to [facility]. Health Care They are not able to staff patient's case at this time.

## 2022-11-17 NOTE — CARE COORDINATION
[facility]. Representative indicated that they only take private pay, long term care insurance or [place]. Representative indicated that from her experience that [facility] only pays for skilled services. Also sent [email or web address] an email asking for assistance with coordination of HHA services for patient.

## 2022-11-18 ENCOUNTER — CARE COORDINATION (OUTPATIENT)
Dept: CARE COORDINATION | Age: 72
End: 2022-11-18

## 2022-11-18 NOTE — CARE COORDINATION
Telephone call with [facility]. [facility]. Representative indicated patient would need to have a skilled service in order to qualify for A assistance.

## 2022-11-18 NOTE — CARE COORDINATION
Telephone call with patient. Relayed results of phone calls to try and coordinate HHC/HHA. Patient receives [facility] from his past employer/[employer]. Discussed him contacting  with Mauro Flores for recommendations for coordinating HHC/HHA assistance. He stated that his sight is improving. He has an eye appointment next Wednesday.

## 2022-11-21 ENCOUNTER — CARE COORDINATION (OUTPATIENT)
Dept: CARE COORDINATION | Age: 72
End: 2022-11-21

## 2022-11-21 NOTE — CARE COORDINATION
Telephone call with Luisito Jaeger.  They do not work with [facility] and would have to be private pay or on a waiver program.

## 2022-11-21 NOTE — CARE COORDINATION
Telephone call with patient. He was on hold with the [employer] and could not get a hold of anyone. Patient was on hold for three hours. . Provided patient with summary of phone calls made today.

## 2022-11-21 NOTE — CARE COORDINATION
Telephone call to [employer]. They are only a private pay company and do not cover [facility] any longer. straight cane

## 2022-11-22 ENCOUNTER — CARE COORDINATION (OUTPATIENT)
Dept: CARE COORDINATION | Age: 72
End: 2022-11-22

## 2022-11-22 NOTE — CARE COORDINATION
Telephone call with patient. He stated that he spoke to [facility] and will be getting new cards in  beginning December. He did not get any additional information about home health care from [facility]. Sent Elsi Beasley/provider referral [email or web address] a return email with referral information again, as they were unable to see referral as said it was either removed or deleted.

## 2022-11-22 NOTE — CARE COORDINATION
[facility]. Spoke with LINDA who indicated they work with [employer], long term care insurance and private pay. They do work with [employer].

## 2022-11-29 ENCOUNTER — CARE COORDINATION (OUTPATIENT)
Dept: CARE COORDINATION | Age: 72
End: 2022-11-29

## 2022-11-29 NOTE — CARE COORDINATION
Telephone call with patient. He stated that his vision has improved. He is not  stumbling around. Vianey Reynolds He can see in the  house well. Only thing he can't do is drive. He doesn't feel he needs any help at this time. He has someone to drive him to appointments and errands. Patient would like to know if his [facility] has transportation benefit. Discussed plan to reach out to [facility] regarding transportation benefit.

## 2022-11-29 NOTE — CARE COORDINATION
Telephone call to [facility] to discuss transportation. benefit. Representative indicated that patient only has ambulance transportation.

## 2022-12-02 DIAGNOSIS — N52.2 DRUG-INDUCED ERECTILE DYSFUNCTION: ICD-10-CM

## 2022-12-03 NOTE — TELEPHONE ENCOUNTER
pharmacy requesting medication refill. Please approve or deny this request.    Rx requested:  Requested Prescriptions     Pending Prescriptions Disp Refills    sildenafil (VIAGRA) 100 MG tablet [Pharmacy Med Name: SILDENAFIL 100 MG TABLET] 18 tablet 2     Sig: take 1 tablet by mouth 60 MINUTE before SEXUAL INTERCOURSE if needed         Last Office Visit:   11/7/2022      Next Visit Date:  No future appointments.

## 2022-12-09 ENCOUNTER — CARE COORDINATION (OUTPATIENT)
Dept: CARE COORDINATION | Age: 72
End: 2022-12-09

## 2022-12-09 RX ORDER — SILDENAFIL 100 MG/1
TABLET, FILM COATED ORAL
Qty: 18 TABLET | Refills: 2 | Status: SHIPPED | OUTPATIENT
Start: 2022-12-09

## 2022-12-09 NOTE — CARE COORDINATION
Telephone call with patient. He feels his vision is improving. He is back to driving a little. He go his new [facility] cards which are in effect now. Representative from [facility] called him the other day. No new needs or concerns at time of visit. He is not sure when he will have additional eye surgery.

## 2022-12-21 ENCOUNTER — CARE COORDINATION (OUTPATIENT)
Dept: CARE COORDINATION | Age: 72
End: 2022-12-21

## 2022-12-21 NOTE — CARE COORDINATION
Telephone call with patient. He is considering need for HHA/housekeeping assistance in the home. Discussed plan to make housekeeping referral to [facility] on [facility]. Explained that their is a waiting list for housekeeping services with [facility] on [facility]. Discussed plan to contact [facility]/[place] regarding assistance in the home with [facility]. [employer] email requesting assistance for patient.

## 2022-12-21 NOTE — CARE COORDINATION
Telephone call to [facility] on Aging/Tina/housekeeping. Left information on her voicemail regarding referral for patient.

## 2023-01-05 ENCOUNTER — CARE COORDINATION (OUTPATIENT)
Dept: CARE COORDINATION | Age: 73
End: 2023-01-05

## 2023-01-05 NOTE — CARE COORDINATION
Telephone call with patient. He indicated that he heard from [facility] Office on Aging and was told that he is 46 on waiting list for housekeeping services.  Discussed have not heard from [facility]/Optyuriy Buckner regarding help in the home.  He is driving to medical appointments and grocery store.

## 2023-01-12 ENCOUNTER — CARE COORDINATION (OUTPATIENT)
Dept: CARE COORDINATION | Age: 73
End: 2023-01-12

## 2023-01-12 NOTE — CARE COORDINATION
Telephone call with patient. He thinks he may have found someone to help him with housekeeping but he is waiting to see if he has the  extra money to pay her. He had to get a new car. Relayed never heard back from [facility] .

## 2023-01-20 ENCOUNTER — CARE COORDINATION (OUTPATIENT)
Dept: CARE COORDINATION | Age: 73
End: 2023-01-20

## 2023-01-20 NOTE — CARE COORDINATION
Telephone call with patient. He has someone to help him with housekeeping as necessary. He is seeing and getting around better. He is driving now. He spoke to  [facility] and they told him would need to have in hospital or SNF and discharged before they could be of assistance. Discussed that his name is on waiting list for housekeeping services with [facility] on Aging.

## 2023-01-27 ENCOUNTER — CARE COORDINATION (OUTPATIENT)
Dept: CARE COORDINATION | Age: 73
End: 2023-01-27

## 2023-01-27 NOTE — CARE COORDINATION
Telephone call with patient. Things are the same and will see eye Dr/[facility] in February. He anticipates he may need additional  eye surgery. Patient is driving short distances. Reviewed that he is not interested in meals-on-wheels as he has dietary preferences and they could not accommodate.

## 2023-02-06 ENCOUNTER — CARE COORDINATION (OUTPATIENT)
Dept: CARE COORDINATION | Age: 73
End: 2023-02-06

## 2023-02-06 NOTE — CARE COORDINATION
Telephone call with patient. He is aware that his name is on waiting list for [facility] on Aging. He is insure when his next  eye surgery and he thinks he has an appointment this month. He is driving to  and grocery store. Nothing new has changed or community resource needs. .     .

## 2023-02-14 ENCOUNTER — CARE COORDINATION (OUTPATIENT)
Dept: CARE COORDINATION | Age: 73
End: 2023-02-14

## 2023-02-14 NOTE — CARE COORDINATION
Telephone call with patient. Provided him with contact information for [facility] on Aging for housekeeping program.  He feels things are about the same with him.

## 2023-02-21 ENCOUNTER — CARE COORDINATION (OUTPATIENT)
Dept: CARE COORDINATION | Age: 73
End: 2023-02-21

## 2023-02-21 NOTE — CARE COORDINATION
Telephone call with patient. He spoke that he may need additional eye surgery. He did speak with [facility] on Aging/housekeeping and he is 40 on waiting list for program.  He is considering hiring help from an agency . Discussed that agencies do have minium hours. Patient was open to this writer sending him out list of private hire agencies. List..

## 2023-02-21 NOTE — LETTER
Lizet Medina  [address]      Dear Cely Plaza,    I hope this letter finds you doing well! I am sending you resource information on [address]. I hope you find the information helpful. Please look them over and let me know if you have any questions or need further assistance. You can contact me at any of the numbers listed below. Sincerely,    MONI Montgomery  , [facility]  Cell Phone: [phone]  Email: [email or web address]

## 2023-02-23 ENCOUNTER — OFFICE VISIT (OUTPATIENT)
Dept: FAMILY MEDICINE CLINIC | Age: 73
End: 2023-02-23

## 2023-02-23 VITALS
SYSTOLIC BLOOD PRESSURE: 120 MMHG | RESPIRATION RATE: 16 BRPM | HEART RATE: 57 BPM | WEIGHT: 253 LBS | OXYGEN SATURATION: 98 % | DIASTOLIC BLOOD PRESSURE: 84 MMHG | HEIGHT: 67 IN | BODY MASS INDEX: 39.71 KG/M2

## 2023-02-23 DIAGNOSIS — Z12.11 COLON CANCER SCREENING: ICD-10-CM

## 2023-02-23 DIAGNOSIS — I10 ESSENTIAL HYPERTENSION, BENIGN: ICD-10-CM

## 2023-02-23 DIAGNOSIS — H40.1133 PRIMARY OPEN ANGLE GLAUCOMA (POAG) OF BOTH EYES, SEVERE STAGE: ICD-10-CM

## 2023-02-23 DIAGNOSIS — E66.01 SEVERE OBESITY (BMI 35.0-39.9) WITH COMORBIDITY (HCC): Primary | ICD-10-CM

## 2023-02-23 DIAGNOSIS — E66.01 SEVERE OBESITY (BMI 35.0-39.9) WITH COMORBIDITY (HCC): ICD-10-CM

## 2023-02-23 DIAGNOSIS — E78.5 DYSLIPIDEMIA: ICD-10-CM

## 2023-02-23 DIAGNOSIS — Z13.6 SCREENING FOR AAA (ABDOMINAL AORTIC ANEURYSM): ICD-10-CM

## 2023-02-23 PROBLEM — H61.21 IMPACTED CERUMEN, RIGHT EAR: Status: RESOLVED | Noted: 2020-11-10 | Resolved: 2023-02-23

## 2023-02-23 PROBLEM — R79.89 ABNORMAL CBC: Status: RESOLVED | Noted: 2020-11-10 | Resolved: 2023-02-23

## 2023-02-23 LAB
ALBUMIN SERPL-MCNC: 4.2 G/DL (ref 3.5–4.6)
ALP BLD-CCNC: 75 U/L (ref 35–104)
ALT SERPL-CCNC: 14 U/L (ref 0–41)
ANION GAP SERPL CALCULATED.3IONS-SCNC: 10 MEQ/L (ref 9–15)
AST SERPL-CCNC: 16 U/L (ref 0–40)
BILIRUB SERPL-MCNC: 0.5 MG/DL (ref 0.2–0.7)
BUN BLDV-MCNC: 13 MG/DL (ref 8–23)
CALCIUM SERPL-MCNC: 10.6 MG/DL (ref 8.5–9.9)
CHLORIDE BLD-SCNC: 104 MEQ/L (ref 95–107)
CO2: 29 MEQ/L (ref 20–31)
CREAT SERPL-MCNC: 0.89 MG/DL (ref 0.7–1.2)
GFR SERPL CREATININE-BSD FRML MDRD: >60 ML/MIN/{1.73_M2}
GLOBULIN: 3.2 G/DL (ref 2.3–3.5)
GLUCOSE BLD-MCNC: 110 MG/DL (ref 70–99)
HCT VFR BLD CALC: 44.2 % (ref 42–52)
HEMOGLOBIN: 14.2 G/DL (ref 14–18)
MCH RBC QN AUTO: 28.6 PG (ref 27–31.3)
MCHC RBC AUTO-ENTMCNC: 32.2 % (ref 33–37)
MCV RBC AUTO: 88.7 FL (ref 79–92.2)
PDW BLD-RTO: 12.7 % (ref 11.5–14.5)
PLATELET # BLD: 277 K/UL (ref 130–400)
POTASSIUM SERPL-SCNC: 4 MEQ/L (ref 3.4–4.9)
RBC # BLD: 4.98 M/UL (ref 4.7–6.1)
SODIUM BLD-SCNC: 143 MEQ/L (ref 135–144)
TOTAL PROTEIN: 7.4 G/DL (ref 6.3–8)
WBC # BLD: 6.6 K/UL (ref 4.8–10.8)

## 2023-02-23 SDOH — ECONOMIC STABILITY: HOUSING INSECURITY
IN THE LAST 12 MONTHS, WAS THERE A TIME WHEN YOU DID NOT HAVE A STEADY PLACE TO SLEEP OR SLEPT IN A SHELTER (INCLUDING NOW)?: NO

## 2023-02-23 SDOH — ECONOMIC STABILITY: FOOD INSECURITY: WITHIN THE PAST 12 MONTHS, THE FOOD YOU BOUGHT JUST DIDN'T LAST AND YOU DIDN'T HAVE MONEY TO GET MORE.: NEVER TRUE

## 2023-02-23 SDOH — ECONOMIC STABILITY: FOOD INSECURITY: WITHIN THE PAST 12 MONTHS, YOU WORRIED THAT YOUR FOOD WOULD RUN OUT BEFORE YOU GOT MONEY TO BUY MORE.: NEVER TRUE

## 2023-02-23 SDOH — ECONOMIC STABILITY: INCOME INSECURITY: HOW HARD IS IT FOR YOU TO PAY FOR THE VERY BASICS LIKE FOOD, HOUSING, MEDICAL CARE, AND HEATING?: NOT HARD AT ALL

## 2023-02-23 ASSESSMENT — PATIENT HEALTH QUESTIONNAIRE - PHQ9
SUM OF ALL RESPONSES TO PHQ QUESTIONS 1-9: 0
1. LITTLE INTEREST OR PLEASURE IN DOING THINGS: 0
SUM OF ALL RESPONSES TO PHQ QUESTIONS 1-9: 0
SUM OF ALL RESPONSES TO PHQ9 QUESTIONS 1 & 2: 0
SUM OF ALL RESPONSES TO PHQ QUESTIONS 1-9: 0
2. FEELING DOWN, DEPRESSED OR HOPELESS: 0
SUM OF ALL RESPONSES TO PHQ QUESTIONS 1-9: 0

## 2023-02-23 ASSESSMENT — ENCOUNTER SYMPTOMS
EYE DISCHARGE: 0
EYE PAIN: 0
SHORTNESS OF BREATH: 0
EYE REDNESS: 0
EYE ITCHING: 0
COLOR CHANGE: 0
CHEST TIGHTNESS: 0
PHOTOPHOBIA: 1
ABDOMINAL PAIN: 0
ABDOMINAL DISTENTION: 0

## 2023-02-23 NOTE — PROGRESS NOTES
Subjective  Sarah Bee, 68 y.o. male presents today with:  Chief Complaint   Patient presents with    Follow-up     No new concerns        HPI  In the office today for routine follow up  Last OV with me: 11/7/22 via VV. Complaint with his medications. Denies dizziness, HA, chest pain. Due for labs. Will be due for repeat colonoscopy screening 12/2023. Due for AAA screening. No alcohol use. Former smoker. Plans on returning to gym in March for exercise/health. He was been under the care of [facility] eye clinic for glaucoma and cataracts. 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 Using his eye drops. He has had several eye procedures in the past several months. Jesi Starr, [facility] , has been helping with coordination of care as he lives alone. Review of Systems   Constitutional:  Negative for activity change, appetite change, chills, diaphoresis, fatigue and fever. HENT:  Negative for congestion. Eyes:  Positive for photophobia and visual disturbance. Negative for pain, discharge, redness and itching. Respiratory:  Negative for chest tightness and shortness of breath. Cardiovascular:  Negative for chest pain, palpitations and leg swelling. Gastrointestinal:  Negative for abdominal distention and abdominal pain. Genitourinary:  Negative for difficulty urinating. Skin:  Negative for color change. Neurological:  Negative for dizziness, light-headedness and headaches. Psychiatric/Behavioral:  Negative for agitation, dysphoric mood and sleep disturbance. The patient is not nervous/anxious.       Past Medical History:   Diagnosis Date    Diabetes mellitus, type 2 ([address] 10/11/2011    pt relates he was borderline at one time and now denies being diabetic and relates his physician has not prescribed any meds or blood sugar monitoring    Dyslipidemia 10/11/2011    Erectile dysfunction 7/19/2012    Essential hypertension, benign 10/11/2011    Glaucoma     History of colon polyps adenomatous polyps     Past Surgical History:   Procedure Laterality Date    CATARACT REMOVAL Right 2/17/15    [facility]    COLONOSCOPY  11    Dr Corrinne Grounds    COLONOSCOPY N/A 2016    COLONOSCOPY performed by Isi Vu MD at [facility]    COLONOSCOPY N/A 2020    COLONOSCOPY WITH POLYPECTOMY performed by Berta Chavez MD at [address] [place] [address] Right 14    [facility]     Social History     Socioeconomic History    Marital status: Single     Spouse name: Not on file    Number of children: Not on file    Years of education: Not on file    Highest education level: Not on file   Occupational History    Not on file   Tobacco Use    Smoking status: Former     Packs/day: 1.50     Years: 30.00     Pack years: 45.00     Types: Cigarettes     Quit date: 2000     Years since quittin.1    Smokeless tobacco: Never   Vaping Use    Vaping Use: Never used   Substance and Sexual Activity    Alcohol use: No    Drug use: Not on file    Sexual activity: Not on file   Other Topics Concern    Not on file   Social History Narrative    Not on file     Social Determinants of Health     Financial Resource Strain: Low Risk     Difficulty of Paying Living Expenses: Not hard at all   Food Insecurity: No Food Insecurity    Worried About [facility] in the Last Year: Never true    [address] in the Last Year: Never true   Transportation Needs: Unknown    Lack of Transportation (Medical): Not on file    Lack of Transportation (Non-Medical): No   Physical Activity: Sufficiently Active    Days of Exercise per Week: 4 days    Minutes of Exercise per Session: 70 min   Stress: Not on file   Social Connections: Not on file   Intimate Partner Violence: Not on file   Housing Stability: Unknown    Unable to Pay for Housing in the Last Year: Not on file    Number of Places Lived in the Last Year: Not on file    Unstable Housing in the Last Year: No     No family history on file.   Allergies Allergen Reactions    Apraclonidine      Other reaction(s): Other: See Comments  Irritation, not tolerable     Brimonidine      Other reaction(s): Other: See Comments  Follicles      Current Outpatient Medications   Medication Sig Dispense Refill    sildenafil (VIAGRA) 100 MG tablet take 1 tablet by mouth 60 MINUTE before SEXUAL INTERCOURSE if needed 18 tablet 2    atropine 1 % ophthalmic solution       ciprofloxacin (CILOXAN) 0.3 % ophthalmic solution instill 1 drop into left eye every 2 hours while awake      prednisoLONE acetate (PRED FORTE) 1 % ophthalmic suspension 1 drop      hydroCHLOROthiazide (HYDRODIURIL) 12.5 MG tablet take 1 tablet by mouth once daily 90 tablet 1    Handicap Placard MISC by Does not apply route Good for 5 years; expires 5/28/2027. 1 each 0    lisinopril (PRINIVIL;ZESTRIL) 20 MG tablet take 1 tablet by mouth once daily 90 tablet 4    vitamin D (DIALYVITE VITAMIN D 5000) 125 MCG (5000 UT) CAPS capsule Take 1 capsule by mouth daily 90 capsule 4    amLODIPine (NORVASC) 10 MG tablet take 1 tablet by mouth once daily 90 tablet 4    simvastatin (ZOCOR) 20 MG tablet take 1 tablet by mouth at bedtime 90 tablet 4    cyanocobalamin ([employer] VITAMIN B12) 1000 MCG tablet Take 1 tablet by mouth daily 90 tablet 4    aspirin 81 MG EC tablet Take 1 tablet by mouth daily 30 tablet 11    latanoprost (XALATAN) 0.005 % ophthalmic solution Use 1 Drop in both eyes daily at bedtime. dorzolamide-timolol (COSOPT) 22.3-6.8 MG/ML ophthalmic solution Use 1 Drop in both eyes twice daily. No current facility-administered medications for this visit. PMH, Surgical Hx, Family Hx, and Social Hx reviewed and updated. Health Maintenance reviewed.     Objective  Vitals:    02/23/23 1330 02/23/23 1333 02/23/23 1350   BP: (!) 160/100 (!) 160/100 120/84   Site: Left Upper Arm  Left Upper Arm   Position: Sitting  Sitting   Cuff Size: Medium Adult  Large Adult   Pulse: 57     Resp: 16     SpO2: 98%     Weight: 253 lb (114.8 kg)     Height: 5' 7\" (1.702 m)       BP Readings from Last 3 Encounters:   02/23/23 120/84   05/20/22 (!) 140/80   03/25/22 138/80     Wt Readings from Last 3 Encounters:   02/23/23 253 lb (114.8 kg)   05/20/22 250 lb (113.4 kg)   03/25/22 257 lb (116.6 kg)     Physical Exam  Constitutional:       General: He is not in acute distress. Appearance: He is well-developed. He is obese. He is not diaphoretic. Comments: Obese male. Sitting comfortably in exam room. In a great mood. HENT:      Head: Normocephalic and atraumatic. Right Ear: Hearing, tympanic membrane, ear canal and external ear normal.      Left Ear: Hearing, tympanic membrane, ear canal and external ear normal.      Nose: Nose normal.      Mouth/Throat:      Pharynx: No oropharyngeal exudate. Eyes:      Conjunctiva/sclera: Conjunctivae normal.      Comments: Wearing glasses. Cardiovascular:      Rate and Rhythm: Normal rate and regular rhythm. Heart sounds: Normal heart sounds. No murmur heard. Pulmonary:      Effort: Pulmonary effort is normal. No respiratory distress. Breath sounds: Normal breath sounds. No wheezing or rales. Musculoskeletal:      Cervical back: Normal range of motion and neck supple. Lymphadenopathy:      Cervical: No cervical adenopathy. Skin:     General: Skin is warm and dry. Neurological:      Mental Status: He is alert and oriented to person, place, and time. Cranial Nerves: No cranial nerve deficit. Psychiatric:         Attention and Perception: Attention normal.         Mood and Affect: Mood and affect normal.         Speech: Speech normal.         Behavior: Behavior normal.         Thought Content: Thought content normal.         Cognition and Memory: Cognition normal.         Judgment: Judgment normal.      Comments: Retired from [employer]. Family lives in [place]. Likes living in [place]. Assessment & Plan    Diagnosis Orders   1. Severe obesity (BMI 35.0-39. 9) with comorbidity ([facility] [address]  CBC    Comprehensive Metabolic Panel      2. Screening for AAA (abdominal aortic aneurysm)  US SCREENING FOR AAA      3. Essential hypertension, benign  CBC    Comprehensive Metabolic Panel      4. Colon cancer screening  Andres Fine MD, [facility]      5. Dyslipidemia        6. Primary open angle glaucoma (POAG) of both eyes, severe stage        Continues f/u with [facility] ophthalmology. Routine labs. Follow up with me in 6 months. Orders Placed This Encounter   Procedures    [place] AAA     This procedure can be scheduled via [facility]. Access your [facility] account by visiting [email or web address]. Standing Status:   Future     Standing Expiration Date:   2/23/2024     Order Specific Question:   Reason for exam:     Answer:   AAA screening. CBC     Standing Status:   Future     Standing Expiration Date:   2/23/2024    Comprehensive Metabolic Panel     Standing Status:   Future     Standing Expiration Date:   2/23/2024    Andres Fine MD, [facility]     Referral Priority:   Routine     Referral Type:   Eval and Treat     Referral Reason:   Specialty Services Required     Referred to Provider:   Jessika Simon MD     Requested Specialty:   Gastroenterology     Number of Visits Requested:   1     No orders of the defined types were placed in this encounter. There are no discontinued medications. Return in about 6 months (around 8/23/2023) for follow up in office. Reviewed with the patient: current clinical status, medications, activities and diet. Side effects, adverse effects of the medication prescribed today, as well as treatment plan/ rationale and result expectations have been discussed with the patient who expresses understanding and desires to proceed. Close follow up to evaluate treatment results and for coordination of care. I have reviewed the patient's medical history in detail and updated the computerized patient record.     [facility] MING Cao PA-C

## 2023-02-28 ENCOUNTER — CARE COORDINATION (OUTPATIENT)
Dept: CARE COORDINATION | Age: 73
End: 2023-02-28

## 2023-02-28 NOTE — CARE COORDINATION
Telephone call to patient. He stated that he got the private hire agencies list but has not looked at it yet. He is seeing a little better and driving now.

## 2023-03-02 DIAGNOSIS — Z76.0 MEDICATION REFILL: ICD-10-CM

## 2023-03-02 DIAGNOSIS — I10 ESSENTIAL HYPERTENSION, BENIGN: ICD-10-CM

## 2023-03-02 RX ORDER — HYDROCHLOROTHIAZIDE 12.5 MG/1
TABLET ORAL
Qty: 90 TABLET | Refills: 1 | Status: SHIPPED | OUTPATIENT
Start: 2023-03-02

## 2023-03-02 NOTE — TELEPHONE ENCOUNTER
Patient is requesting medication refill.  Please approve or deny this request.    Rx requested:  Requested Prescriptions     Pending Prescriptions Disp Refills    hydroCHLOROthiazide (HYDRODIURIL) 12.5 MG tablet [Pharmacy Med Name: HYDROCHLOROTHIAZIDE 12.5 MG TB] 90 tablet 1     Sig: take 1 tablet by mouth once daily         Last Office Visit:   2/23/2023      Next Visit Date:  Future Appointments   Date Time Provider Aleksandra Cisneros   3/13/2023 10:00 AM KEENAN ULTRASOUND 2 [facility]LO[facility]   8/25/2023 11:15 AM ANNETTE May [facility]

## 2023-03-07 ENCOUNTER — CARE COORDINATION (OUTPATIENT)
Dept: CARE COORDINATION | Age: 73
End: 2023-03-07

## 2023-03-07 NOTE — CARE COORDINATION
Telephone call with patient. He has not made any phone calls to Denice yet. He is doing well at this time. He will not know until 5/1/2023 eye appointment to know if further surgery is planned.

## 2023-03-13 ENCOUNTER — HOSPITAL ENCOUNTER (OUTPATIENT)
Dept: ULTRASOUND IMAGING | Age: 73
Discharge: HOME | End: 2023-03-15
Payer: MEDICARE

## 2023-03-13 DIAGNOSIS — N52.2 DRUG-INDUCED ERECTILE DYSFUNCTION: ICD-10-CM

## 2023-03-13 DIAGNOSIS — Z13.6 SCREENING FOR AAA (ABDOMINAL AORTIC ANEURYSM): ICD-10-CM

## 2023-03-13 PROCEDURE — 76706 US ABDL AORTA SCREEN AAA: CPT

## 2023-03-13 RX ORDER — SILDENAFIL 100 MG/1
TABLET, FILM COATED ORAL
Qty: 18 TABLET | Refills: 2 | Status: SHIPPED | OUTPATIENT
Start: 2023-03-13

## 2023-03-13 NOTE — TELEPHONE ENCOUNTER
Rx request   Requested Prescriptions     Pending Prescriptions Disp Refills    sildenafil (VIAGRA) 100 MG tablet 18 tablet 2     Sig: take 1 tablet by mouth 60 MINUTE before SEXUAL INTERCOURSE if needed     LOV 2/23/2023  Last refill 12/9/22  Next Visit Date:  Future Appointments   Date Time Provider Aleksandra Cisneros   8/25/2023 11:15 AM Paula Rivera PA-C [address]

## 2023-03-15 ENCOUNTER — CARE COORDINATION (OUTPATIENT)
Dept: CARE COORDINATION | Age: 73
End: 2023-03-15

## 2023-03-15 NOTE — CARE COORDINATION
Telephone call with patient. He is waiting until after his appointment with eye doctor in May before he calls private hire agencies. He continues to drive. A friend comes over to help him as needed. No new community needs at this time.

## 2023-03-22 ENCOUNTER — CARE COORDINATION (OUTPATIENT)
Dept: CARE COORDINATION | Age: 73
End: 2023-03-22

## 2023-03-22 NOTE — CARE COORDINATION
Telephone call with patient. He has an appointment in May with eye doctor. Discussed that could contact [facility] again if he has eye surgery to see if can get assistance in the home. He has a person coming over his house occasionally to provide assistance. He feels able to manage at this time. Rosibel Frankel

## 2023-03-29 ENCOUNTER — CARE COORDINATION (OUTPATIENT)
Dept: CARE COORDINATION | Age: 73
End: 2023-03-29

## 2023-03-29 NOTE — CARE COORDINATION
Telephone call with patient. He indicated that he spoke with [facility] on Aging/housekeeping and was told still on their waiting list.  He spoke of his eye appointment in May and uncertainty if he will have additional surgery. Discussed plan to discharge from [facility]/ . Patient to let this writer know if additional surgery is needed. Confirmed that patient has this writer's phone number for future reference.

## 2023-04-21 ENCOUNTER — TELEPHONE (OUTPATIENT)
Dept: FAMILY MEDICINE CLINIC | Age: 73
End: 2023-04-21

## 2023-04-21 NOTE — TELEPHONE ENCOUNTER
[facility] [place] to return my call. Did he ever follow up with home health care?  There is a referral from November 2022 in [address] [place] [address]

## 2023-04-24 RX ORDER — LANOLIN ALCOHOL/MO/W.PET/CERES
CREAM (GRAM) TOPICAL
Qty: 90 TABLET | Refills: 4 | Status: SHIPPED | OUTPATIENT
Start: 2023-04-24

## 2023-04-24 NOTE — TELEPHONE ENCOUNTER
Comments: This request is coming from the pharmacy. Last Office Visit (last PCP visit):   2/23/2023    Next Visit Date:  Future Appointments   Date Time Provider Aleksandra Cisneros   8/25/2023 11:15 AM ANNETTE [facility]       If hasn't been seen in over a year OR hasn't followed up according to last diabetes/ADHD visit, make appointment for patient before sending refill to provider.     Rx requested:  Requested Prescriptions     Pending Prescriptions Disp Refills    vitamin B-12 (CYANOCOBALAMIN) 1000 MCG tablet [Pharmacy Med Name: VITAMIN B-12 1,000 MCG TABLET] 90 tablet 4     Sig: take 1 tablet by mouth once daily

## 2023-06-05 DIAGNOSIS — N52.2 DRUG-INDUCED ERECTILE DYSFUNCTION: ICD-10-CM

## 2023-06-07 DIAGNOSIS — E78.5 DYSLIPIDEMIA: ICD-10-CM

## 2023-06-07 DIAGNOSIS — I10 ESSENTIAL HYPERTENSION, BENIGN: ICD-10-CM

## 2023-06-07 RX ORDER — SIMVASTATIN 20 MG
TABLET ORAL
Qty: 90 TABLET | Refills: 4 | Status: SHIPPED | OUTPATIENT
Start: 2023-06-07

## 2023-06-07 RX ORDER — AMLODIPINE BESYLATE 10 MG/1
TABLET ORAL
Qty: 90 TABLET | Refills: 4 | Status: SHIPPED | OUTPATIENT
Start: 2023-06-07

## 2023-06-07 RX ORDER — SILDENAFIL 100 MG/1
TABLET, FILM COATED ORAL
Qty: 18 TABLET | Refills: 2 | Status: SHIPPED | OUTPATIENT
Start: 2023-06-07

## 2023-06-07 RX ORDER — LISINOPRIL 20 MG/1
TABLET ORAL
Qty: 90 TABLET | Refills: 4 | Status: SHIPPED | OUTPATIENT
Start: 2023-06-07

## 2023-06-07 NOTE — TELEPHONE ENCOUNTER
Requesting medication refill.  Please approve or deny this request.    Rx requested:  Requested Prescriptions     Pending Prescriptions Disp Refills    amLODIPine (NORVASC) 10 MG tablet [Pharmacy Med Name: AMLODIPINE BESYLATE 10 MG TAB] 90 tablet 4     Sig: take 1 tablet by mouth once daily    simvastatin (ZOCOR) 20 MG tablet [Pharmacy Med Name: SIMVASTATIN 20 MG TABLET] 90 tablet 4     Sig: take 1 tablet by mouth at bedtime    lisinopril (PRINIVIL;ZESTRIL) 20 MG tablet [Pharmacy Med Name: LISINOPRIL 20 MG TABLET] 90 tablet 4     Sig: take 1 tablet by mouth once daily       Last Office Visit:   2/23/2023    Next Visit Date:  Future Appointments   Date Time Provider Aleksandra Cisneros   8/25/2023 11:15 AM Murray Álvarez PA-C [address] [place] [address]

## 2023-06-07 NOTE — TELEPHONE ENCOUNTER
Requesting medication refill.  Please approve or deny this request.    Rx requested:  Requested Prescriptions     Pending Prescriptions Disp Refills    sildenafil (VIAGRA) 100 MG tablet [Pharmacy Med Name: SILDENAFIL 100 MG TABLET] 18 tablet 2     Sig: take 1 tablet by mouth 60 MINUTES BEFORE SEXUAL INTERCOURSE IF NEEDED       Last Office Visit:   2/23/2023    Next Visit Date:  Future Appointments   Date Time Provider Aleksandra Cisneros   8/25/2023 11:15 AM Paula Rivera PA-C [address] [place] [address]

## 2023-08-25 ENCOUNTER — OFFICE VISIT (OUTPATIENT)
Dept: FAMILY MEDICINE CLINIC | Age: 73
End: 2023-08-25

## 2023-08-25 VITALS
OXYGEN SATURATION: 98 % | WEIGHT: 253 LBS | HEART RATE: 52 BPM | HEIGHT: 67 IN | DIASTOLIC BLOOD PRESSURE: 82 MMHG | SYSTOLIC BLOOD PRESSURE: 130 MMHG | BODY MASS INDEX: 39.71 KG/M2 | RESPIRATION RATE: 18 BRPM

## 2023-08-25 DIAGNOSIS — Z12.5 PROSTATE CANCER SCREENING: ICD-10-CM

## 2023-08-25 DIAGNOSIS — E78.5 DYSLIPIDEMIA: ICD-10-CM

## 2023-08-25 DIAGNOSIS — I10 ESSENTIAL HYPERTENSION, BENIGN: ICD-10-CM

## 2023-08-25 DIAGNOSIS — H40.1133 PRIMARY OPEN ANGLE GLAUCOMA (POAG) OF BOTH EYES, SEVERE STAGE: ICD-10-CM

## 2023-08-25 DIAGNOSIS — Z00.00 MEDICARE ANNUAL WELLNESS VISIT, SUBSEQUENT: Primary | ICD-10-CM

## 2023-08-25 DIAGNOSIS — H17.9 CORNEAL SCARS, BOTH EYES: ICD-10-CM

## 2023-08-25 DIAGNOSIS — N52.9 ERECTILE DYSFUNCTION, UNSPECIFIED ERECTILE DYSFUNCTION TYPE: ICD-10-CM

## 2023-08-25 DIAGNOSIS — Z12.5 SCREENING PSA (PROSTATE SPECIFIC ANTIGEN): ICD-10-CM

## 2023-08-25 DIAGNOSIS — Z11.3 SCREEN FOR STD (SEXUALLY TRANSMITTED DISEASE): ICD-10-CM

## 2023-08-25 DIAGNOSIS — E66.01 SEVERE OBESITY (BMI 35.0-39.9) WITH COMORBIDITY (HCC): ICD-10-CM

## 2023-08-25 LAB
ALBUMIN SERPL-MCNC: 4.3 G/DL (ref 3.5–4.6)
ALP SERPL-CCNC: 73 U/L (ref 35–104)
ALT SERPL-CCNC: 12 U/L (ref 0–41)
ANION GAP SERPL CALCULATED.3IONS-SCNC: 11 MEQ/L (ref 9–15)
AST SERPL-CCNC: 16 U/L (ref 0–40)
BILIRUB SERPL-MCNC: 0.7 MG/DL (ref 0.2–0.7)
BUN SERPL-MCNC: 13 MG/DL (ref 8–23)
CALCIUM SERPL-MCNC: 10.4 MG/DL (ref 8.5–9.9)
CHLORIDE SERPL-SCNC: 102 MEQ/L (ref 95–107)
CHOLEST SERPL-MCNC: 172 MG/DL (ref 0–199)
CO2 SERPL-SCNC: 26 MEQ/L (ref 20–31)
CREAT SERPL-MCNC: 0.91 MG/DL (ref 0.7–1.2)
ERYTHROCYTE [DISTWIDTH] IN BLOOD BY AUTOMATED COUNT: 12.9 % (ref 11.5–14.5)
GLOBULIN SER CALC-MCNC: 3 G/DL (ref 2.3–3.5)
GLUCOSE SERPL-MCNC: 91 MG/DL (ref 70–99)
HCT VFR BLD AUTO: 44 % (ref 42–52)
HDLC SERPL-MCNC: 48 MG/DL (ref 40–59)
HGB BLD-MCNC: 14.2 G/DL (ref 14–18)
LDLC SERPL CALC-MCNC: 86 MG/DL (ref 0–129)
MCH RBC QN AUTO: 28.6 PG (ref 27–31.3)
MCHC RBC AUTO-ENTMCNC: 32.3 % (ref 33–37)
MCV RBC AUTO: 88.7 FL (ref 79–92.2)
PLATELET # BLD AUTO: 277 K/UL (ref 130–400)
POTASSIUM SERPL-SCNC: 3.7 MEQ/L (ref 3.4–4.9)
PROT SERPL-MCNC: 7.3 G/DL (ref 6.3–8)
PSA SERPL-MCNC: 5.29 NG/ML (ref 0–4)
RBC # BLD AUTO: 4.96 M/UL (ref 4.7–6.1)
SODIUM SERPL-SCNC: 139 MEQ/L (ref 135–144)
TRIGL SERPL-MCNC: 188 MG/DL (ref 0–150)
WBC # BLD AUTO: 7.5 K/UL (ref 4.8–10.8)

## 2023-08-25 ASSESSMENT — PATIENT HEALTH QUESTIONNAIRE - PHQ9
1. LITTLE INTEREST OR PLEASURE IN DOING THINGS: 0
SUM OF ALL RESPONSES TO PHQ QUESTIONS 1-9: 0
SUM OF ALL RESPONSES TO PHQ9 QUESTIONS 1 & 2: 0
SUM OF ALL RESPONSES TO PHQ QUESTIONS 1-9: 0
2. FEELING DOWN, DEPRESSED OR HOPELESS: 0

## 2023-08-25 ASSESSMENT — LIFESTYLE VARIABLES
HOW MANY STANDARD DRINKS CONTAINING ALCOHOL DO YOU HAVE ON A TYPICAL DAY: PATIENT DOES NOT DRINK
HOW OFTEN DO YOU HAVE A DRINK CONTAINING ALCOHOL: NEVER

## 2023-08-25 NOTE — PROGRESS NOTES
Medicare Annual Wellness Visit    Aleyda Lu is here for Medicare AWV    Assessment & Plan   Medicare annual wellness visit, subsequent  Screening PSA (prostate specific antigen)  -     PSA Screening; Future  Prostate cancer screening  -     PSA Screening; Future  Essential hypertension, benign  -     CBC; Future  -     Comprehensive Metabolic Panel; Future  Dyslipidemia  -     Lipid Panel; Future  Screen for STD (sexually transmitted disease)  -     Trichomonas Vaginalis Rna, Qual Tma, Pap Via; Future  -     Rpr; Future  -     C.trachomatis N.gonorrhoeae DNA, Urine; Future  Corneal scars, both eyes  Primary open angle glaucoma (POAG) of both eyes, severe stage  Erectile dysfunction, unspecified erectile dysfunction type  Severe obesity (BMI 35.0-39. 9) with comorbidity ([address])  Recommendations for Preventive Services Due: see orders and patient instructions/AVS.  Recommended screening schedule for the next 5-10 years is provided to the patient in written form: see Patient Instructions/AVS.     Return in about 6 months (around 2/25/2024) for follow up in office. Subjective   The following acute and/or chronic problems were also addressed today:  Would like testing for STD. Possible exposure. Denies hematuria, dysuria, purulent drainage. He is sexually active. Doing well with his medications. Most recently got refills. Viagra working well for ED. Due for routine labs. He is followed by Dr. Jolie Montenegro for open angle glaucoma. Peripheral vision of R eye is gone. He is receiving injections, using eye drops. Patient's complete Health Risk Assessment and screening values have been reviewed and are found in Flowsheets. The following problems were reviewed today and where indicated follow up appointments were made and/or referrals ordered.     Positive Risk Factor Screenings with Interventions:                 Weight and Activity:  Physical Activity: Sufficiently Active    Days of Exercise

## 2023-08-26 ENCOUNTER — HOSPITAL ENCOUNTER (EMERGENCY)
Age: 73
Discharge: HOME | End: 2023-08-26
Attending: FAMILY MEDICINE
Payer: MEDICARE

## 2023-08-26 VITALS
OXYGEN SATURATION: 98 % | HEIGHT: 66 IN | TEMPERATURE: 97.9 F | DIASTOLIC BLOOD PRESSURE: 72 MMHG | HEART RATE: 47 BPM | WEIGHT: 240 LBS | RESPIRATION RATE: 16 BRPM | SYSTOLIC BLOOD PRESSURE: 155 MMHG | BODY MASS INDEX: 38.57 KG/M2

## 2023-08-26 DIAGNOSIS — T63.441A LOCAL REACTION TO BEE STING, ACCIDENTAL OR UNINTENTIONAL, INITIAL ENCOUNTER: Primary | ICD-10-CM

## 2023-08-26 DIAGNOSIS — R97.20 ELEVATED PSA: Primary | ICD-10-CM

## 2023-08-26 LAB — RPR SER QL: NORMAL

## 2023-08-26 PROCEDURE — 6370000000 HC RX 637 (ALT 250 FOR IP): Performed by: PHYSICIAN ASSISTANT

## 2023-08-26 PROCEDURE — 99283 EMERGENCY DEPT VISIT LOW MDM: CPT

## 2023-08-26 RX ORDER — BACITRACIN ZINC 500 [USP'U]/G
OINTMENT TOPICAL ONCE
Status: COMPLETED | OUTPATIENT
Start: 2023-08-26 | End: 2023-08-26

## 2023-08-26 RX ORDER — GINSENG 100 MG
CAPSULE ORAL
Qty: 28 G | Refills: 0 | Status: SHIPPED | OUTPATIENT
Start: 2023-08-26

## 2023-08-26 RX ORDER — DIPHENHYDRAMINE HCL 25 MG
25 CAPSULE ORAL EVERY 4 HOURS PRN
Qty: 24 CAPSULE | Refills: 0 | Status: SHIPPED | OUTPATIENT
Start: 2023-08-26 | End: 2023-09-05

## 2023-08-26 RX ADMIN — BACITRACIN ZINC: 500 OINTMENT TOPICAL at 18:02

## 2023-08-26 ASSESSMENT — PAIN - FUNCTIONAL ASSESSMENT: PAIN_FUNCTIONAL_ASSESSMENT: 0-10

## 2023-08-26 ASSESSMENT — PAIN DESCRIPTION - LOCATION: LOCATION: FINGER (COMMENT WHICH ONE)

## 2023-08-26 ASSESSMENT — PAIN SCALES - GENERAL: PAINLEVEL_OUTOF10: 7

## 2023-08-26 ASSESSMENT — PAIN DESCRIPTION - PAIN TYPE: TYPE: ACUTE PAIN

## 2023-08-26 ASSESSMENT — PAIN DESCRIPTION - DESCRIPTORS: DESCRIPTORS: BURNING

## 2023-08-26 ASSESSMENT — PAIN DESCRIPTION - ORIENTATION: ORIENTATION: LEFT

## 2023-08-26 ASSESSMENT — ENCOUNTER SYMPTOMS: SHORTNESS OF BREATH: 0

## 2023-08-26 NOTE — ED TRIAGE NOTES
Patient presents with complaints of a bee sting to his left index finger. Patient states this occurred at 1100 am and he wasn't going to come in but every time he washes his hands it stings.  No distress noted on arrival.

## 2023-08-26 NOTE — ED PROVIDER NOTES
[facility]  eMERGENCYdEPARTMENT eNCOUnter        Pt Name: Yolanda Kan  MRN: [id number]  [address] [facility], [place] 1950of evaluation: 8/26/2023  Provider:Keyon Felix PA-C    CHIEF COMPLAINT       Chief Complaint   Patient presents with    Insect Bite     Left finger pain after being stung at 1100 this morning         HISTORY OF PRESENT ILLNESS  (Location/Symptom, Timing/Onset, Context/Setting, Quality, Duration, Modifying Factors, Severity.)   Yolanda Kan is a 68 y.o. male who presents to the emergency department with complaints of being stung by an insect/bee at approximately 11 AM this morning and having continued swelling and pain to the area and believes that the stinger still in place. Patient denies any anaphylactic type reaction or symptoms. HPI    Nursing Notes were reviewed and I agree. REVIEW OF SYSTEMS    (2-9 systems for level 4, 10 or more for level 5)     Review of Systems   Respiratory:  Negative for shortness of breath. Cardiovascular:  Negative for chest pain. Musculoskeletal:  Negative for joint swelling. Skin:  Positive for wound. All other systems reviewed and are negative. as noted above the remainder of the review of systems was reviewed and negative.        PAST MEDICAL HISTORY     Past Medical History:   Diagnosis Date    Diabetes mellitus, type 2 ([address]) 10/11/2011    pt relates he was borderline at one time and now denies being diabetic and relates his physician has not prescribed any meds or blood sugar monitoring    Dyslipidemia 10/11/2011    Erectile dysfunction 7/19/2012    Essential hypertension, benign 10/11/2011    Glaucoma     History of colon polyps     adenomatous polyps         SURGICAL HISTORY       Past Surgical History:   Procedure Laterality Date    CATARACT REMOVAL Right 2/17/15    [facility]    COLONOSCOPY  9/26/11    Dr Rhodes Failing    COLONOSCOPY N/A 11/11/2016    COLONOSCOPY performed by Nelly Luis MD at [address] [place] [address] 2020    COLONOSCOPY WITH POLYPECTOMY performed by Stephanie Pond MD at [facility]    TRABECULECTOMY Right 14    [facility]         CURRENT MEDICATIONS       Previous Medications    AMLODIPINE (NORVASC) 10 MG TABLET    take 1 tablet by mouth once daily    ASPIRIN 81 MG EC TABLET    Take 1 tablet by mouth daily    ATROPINE 1 % OPHTHALMIC SOLUTION        CIPROFLOXACIN (CILOXAN) 0.3 % OPHTHALMIC SOLUTION    instill 1 drop into left eye every 2 hours while awake    DORZOLAMIDE-TIMOLOL (COSOPT) 22.3-6.8 MG/ML OPHTHALMIC SOLUTION    Use 1 Drop in both eyes twice daily. HANDICAP PLACARD MISC    by Does not apply route Good for 5 years; expires 2027. HYDROCHLOROTHIAZIDE (HYDRODIURIL) 12.5 MG TABLET    take 1 tablet by mouth once daily    LATANOPROST (XALATAN) 0.005 % OPHTHALMIC SOLUTION    Use 1 Drop in both eyes daily at bedtime. LISINOPRIL (PRINIVIL;ZESTRIL) 20 MG TABLET    take 1 tablet by mouth once daily    PREDNISOLONE ACETATE (PRED FORTE) 1 % OPHTHALMIC SUSPENSION    1 drop    SILDENAFIL (VIAGRA) 100 MG TABLET    take 1 tablet by mouth 60 MINUTES BEFORE SEXUAL INTERCOURSE IF NEEDED    SIMVASTATIN (ZOCOR) 20 MG TABLET    take 1 tablet by mouth at bedtime    VITAMIN B-12 (CYANOCOBALAMIN) 1000 MCG TABLET    take 1 tablet by mouth once daily    VITAMIN D (DIALYVITE VITAMIN D 5000) 125 MCG (5000 UT) CAPS CAPSULE    Take 1 capsule by mouth daily       ALLERGIES     Apraclonidine and Brimonidine    HISTORY     History reviewed. No pertinent family history.        SOCIAL HISTORY       Social History     Socioeconomic History    Marital status: Single     Spouse name: None    Number of children: None    Years of education: None    Highest education level: None   Tobacco Use    Smoking status: Former     Packs/day: 1.50     Years: 30.00     Pack years: 45.00     Types: Cigarettes     Quit date: 2000     Years since quittin.6    Smokeless tobacco: Never   Vaping Use    Vaping Use: Never used

## 2023-08-27 DIAGNOSIS — E55.9 VITAMIN D DEFICIENCY: ICD-10-CM

## 2023-08-28 LAB
SPECIMEN SOURCE: NORMAL
T VAGINALIS RRNA SPEC QL NAA+PROBE: NEGATIVE

## 2023-08-29 DIAGNOSIS — E55.9 VITAMIN D DEFICIENCY: ICD-10-CM

## 2023-08-29 RX ORDER — CHOLECALCIFEROL (VITAMIN D3) 125 MCG
5000 CAPSULE ORAL DAILY
Qty: 90 CAPSULE | Refills: 4 | Status: SHIPPED | OUTPATIENT
Start: 2023-08-29

## 2023-08-29 RX ORDER — CHOLECALCIFEROL (VITAMIN D3) 125 MCG
CAPSULE ORAL
Qty: 90 CAPSULE | Refills: 4 | Status: SHIPPED | OUTPATIENT
Start: 2023-08-29 | End: 2023-08-29 | Stop reason: SDUPTHER

## 2023-08-29 NOTE — TELEPHONE ENCOUNTER
patient requesting medication refill.  Please approve or deny this request.    Rx requested:  Requested Prescriptions     Pending Prescriptions Disp Refills    vitamin D (RA VITAMIN D-3) 125 MCG (5000 UT) CAPS capsule 90 capsule 4     Sig: Take 1 capsule by mouth daily         Last Office Visit:   8/25/2023      Next Visit Date:  Future Appointments   Date Time Provider [address] [place] [address]   2/26/2024  1:00 PM Ana Flores PA-C [address] [facility]

## 2023-08-29 NOTE — TELEPHONE ENCOUNTER
Comments:     Last Office Visit (last PCP visit):   8/25/2023    Next Visit Date:  Future Appointments   Date Time Provider [address]   2/26/2024  1:00 PM [facility] [address] [place] [address]       **If hasn't been seen in over a year OR hasn't followed up according to last diabetes/ADHD visit, make appointment for patient before sending refill to provider.     Rx requested:  Requested Prescriptions     Pending Prescriptions Disp Refills    RA VITAMIN D-3 125 MCG (5000 UT) CAPS capsule [Pharmacy Med Name: RA VITAMIN D3 5,000 UNIT SFTGL] 90 capsule 4     Sig: take 1 capsule by mouth once daily

## 2023-08-31 LAB
C TRACH DNA UR QL NAA+PROBE: NEGATIVE
N GONORRHOEA DNA UR QL NAA+PROBE: NEGATIVE

## 2023-09-10 DIAGNOSIS — Z76.0 MEDICATION REFILL: ICD-10-CM

## 2023-09-10 DIAGNOSIS — I10 ESSENTIAL HYPERTENSION, BENIGN: ICD-10-CM

## 2023-09-11 RX ORDER — HYDROCHLOROTHIAZIDE 12.5 MG/1
TABLET ORAL
Qty: 90 TABLET | Refills: 1 | Status: SHIPPED | OUTPATIENT
Start: 2023-09-11

## 2023-09-11 NOTE — TELEPHONE ENCOUNTER
Comments:     Last Office Visit (last PCP visit):   8/25/2023    Next Visit Date:  Future Appointments   Date Time Provider [address]   2/26/2024  1:00 PM [employer] [address]       **If hasn't been seen in over a year OR hasn't followed up according to last diabetes/ADHD visit, make appointment for patient before sending refill to provider.     Rx requested:  Requested Prescriptions     Pending Prescriptions Disp Refills    hydroCHLOROthiazide (HYDRODIURIL) 12.5 MG tablet [Pharmacy Med Name: HYDROCHLOROTHIAZIDE 12.5 MG TB] 90 tablet 1     Sig: take 1 tablet by mouth once daily

## 2023-09-29 DIAGNOSIS — N52.2 DRUG-INDUCED ERECTILE DYSFUNCTION: ICD-10-CM

## 2023-10-02 RX ORDER — SILDENAFIL 100 MG/1
TABLET, FILM COATED ORAL
Qty: 18 TABLET | Refills: 2 | Status: SHIPPED | OUTPATIENT
Start: 2023-10-02

## 2023-10-02 NOTE — TELEPHONE ENCOUNTER
Comments:     Last Office Visit (last PCP visit):   8/25/2023    Next Visit Date:  Future Appointments   Date Time Provider [address]   2/26/2024  1:00 PM [employer] [address]       **If hasn't been seen in over a year OR hasn't followed up according to last diabetes/ADHD visit, make appointment for patient before sending refill to provider.     Rx requested:  Requested Prescriptions     Pending Prescriptions Disp Refills    sildenafil (VIAGRA) 100 MG tablet [Pharmacy Med Name: SILDENAFIL 100 MG TABLET] 18 tablet 2     Sig: take 1 tablet by mouth 60 MINUTES BEFORE SEXUAL INTERCOURSE IF NEEDED

## 2023-10-25 ENCOUNTER — TELEPHONE (OUTPATIENT)
Dept: PHARMACY | Facility: CLINIC | Age: 73
End: 2023-10-25

## 2023-10-25 NOTE — TELEPHONE ENCOUNTER
supplies to help keep copays down. He states he actually brought a few of his medications to [facility] yesterday to be filled and is waiting to hear back that they are ready for . Writer attempted to contact [facility] to confirm- extended hold. Left message to request the above medications be filled for patient. No further outreach planned at this time.     Last Visit: 23  Next Visit: 24    Future Appointments   Date Time Provider [address] [place] [address]   2024  1:30 PM Gisele Frye MD [address] [place] [address]   2024  1:00 PM Carolee Cao PA-C [facility] [place], [address]  Department, toll free: [phone], option 1    For Pharmacy Admin Tracking Only    Program: Dulce Maria in place:  No  Recommendation Provided To: Patient/Caregiver: 2 via Telephone and Pharmacy: 2  Intervention Detail: Adherence Monitorin  Intervention Accepted By: Patient/Caregiver: 2 and Pharmacy: 2  Gap Closed?: Yes   Time Spent (min): 30

## 2024-01-08 ENCOUNTER — OFFICE VISIT (OUTPATIENT)
Dept: UROLOGY | Age: 74
End: 2024-01-08
Payer: MEDICARE

## 2024-01-08 VITALS
OXYGEN SATURATION: 99 % | DIASTOLIC BLOOD PRESSURE: 82 MMHG | HEIGHT: 66 IN | SYSTOLIC BLOOD PRESSURE: 138 MMHG | BODY MASS INDEX: 41.78 KG/M2 | HEART RATE: 50 BPM | WEIGHT: 260 LBS

## 2024-01-08 DIAGNOSIS — N13.8 BPH WITH OBSTRUCTION/LOWER URINARY TRACT SYMPTOMS: ICD-10-CM

## 2024-01-08 DIAGNOSIS — R97.20 ELEVATED PSA: Primary | ICD-10-CM

## 2024-01-08 DIAGNOSIS — N40.1 BPH WITH OBSTRUCTION/LOWER URINARY TRACT SYMPTOMS: ICD-10-CM

## 2024-01-08 PROCEDURE — 1123F ACP DISCUSS/DSCN MKR DOCD: CPT | Performed by: UROLOGY

## 2024-01-08 PROCEDURE — 3075F SYST BP GE 130 - 139MM HG: CPT | Performed by: UROLOGY

## 2024-01-08 PROCEDURE — 99204 OFFICE O/P NEW MOD 45 MIN: CPT | Performed by: UROLOGY

## 2024-01-08 PROCEDURE — 3079F DIAST BP 80-89 MM HG: CPT | Performed by: UROLOGY

## 2024-01-08 ASSESSMENT — ENCOUNTER SYMPTOMS
SHORTNESS OF BREATH: 0
RESPIRATORY NEGATIVE: 1
ABDOMINAL DISTENTION: 0
ABDOMINAL PAIN: 0

## 2024-01-08 NOTE — PROGRESS NOTES
Subjective:      Patient ID: Marcell Qureshi is a 73 y.o. male    HPI  This is a 74 yo male with DM, HTN, ED, and sent for evaluation of an elevated PSA. He has no prior  history or surgery. He has NF 1 and occasional urgency but no UI. He has a fair flow and some PVD. He has no intermittency. He has no hematuria or dysuria. He has no UTIs or stones. He has no abd or flank pain. He has no other complaints. He quit ciggs 25 yrs ago. He has no family h/o of  malignancies. I reviewed the PSA history.     Past Medical History:   Diagnosis Date    Diabetes mellitus, type 2 (HCC) 10/11/2011    pt relates he was borderline at one time and now denies being diabetic and relates his physician has not prescribed any meds or blood sugar monitoring    Dyslipidemia 10/11/2011    Erectile dysfunction 2012    Essential hypertension, benign 10/11/2011    Glaucoma     History of colon polyps     adenomatous polyps     Past Surgical History:   Procedure Laterality Date    CATARACT REMOVAL Right 2/17/15    [facility]    COLONOSCOPY  11    Dr Brwoning    COLONOSCOPY N/A 2016    COLON[facility]COPY performed by [facility]ning MD at [facility]    COLONOSCOPY N/A 2020    COLONOSCOPY WITH POLYPECTOMY performed by Toro Painter MD at [facility]    TRABECULECTOMY Right 14    [facility]     Social History     Socioeconomic History    Marital status: Single     Spouse name: None    Number of children: None    Years of education: None    Highest education level: None   Tobacco Use    Smoking status: Former     Current packs/day: 0.00     Average packs/day: 1.5 packs/day for 30.0 years (45.0 ttl pk-yrs)     Types: Cigarettes     Start date: 1970     Quit date: 2000     Years since quittin.0    Smokeless tobacco: Never   Vaping Use    Vaping Use: Never used   Substance and Sexual Activity    Alcohol use: No     Social Determinants of Health     Financial Resource Strain: Low Risk  (2023)    Overall

## 2024-01-22 DIAGNOSIS — N52.2 DRUG-INDUCED ERECTILE DYSFUNCTION: ICD-10-CM

## 2024-01-23 RX ORDER — SILDENAFIL 100 MG/1
TABLET, FILM COATED ORAL
Qty: 18 TABLET | Refills: 2 | Status: SHIPPED | OUTPATIENT
Start: 2024-01-23

## 2024-01-29 DIAGNOSIS — R97.20 ELEVATED PSA: ICD-10-CM

## 2024-01-29 LAB — PSA SERPL-MCNC: 6.14 NG/ML (ref 0–4)

## 2024-01-31 ENCOUNTER — PREP FOR PROCEDURE (OUTPATIENT)
Dept: UROLOGY | Age: 74
End: 2024-01-31

## 2024-01-31 ENCOUNTER — OFFICE VISIT (OUTPATIENT)
Dept: UROLOGY | Age: 74
End: 2024-01-31

## 2024-01-31 VITALS
HEART RATE: 47 BPM | BODY MASS INDEX: 41.78 KG/M2 | HEIGHT: 66 IN | SYSTOLIC BLOOD PRESSURE: 130 MMHG | DIASTOLIC BLOOD PRESSURE: 82 MMHG | WEIGHT: 260 LBS

## 2024-01-31 DIAGNOSIS — R97.20 ELEVATED PSA: ICD-10-CM

## 2024-01-31 DIAGNOSIS — N40.1 BPH WITH OBSTRUCTION/LOWER URINARY TRACT SYMPTOMS: Primary | ICD-10-CM

## 2024-01-31 DIAGNOSIS — N40.1 BPH WITH OBSTRUCTION/LOWER URINARY TRACT SYMPTOMS: ICD-10-CM

## 2024-01-31 DIAGNOSIS — N13.8 BPH WITH OBSTRUCTION/LOWER URINARY TRACT SYMPTOMS: Primary | ICD-10-CM

## 2024-01-31 DIAGNOSIS — N13.8 BPH WITH OBSTRUCTION/LOWER URINARY TRACT SYMPTOMS: ICD-10-CM

## 2024-01-31 LAB
BILIRUBIN, POC: NORMAL
BLOOD URINE, POC: NORMAL
CLARITY, POC: CLEAR
COLOR, POC: YELLOW
GLUCOSE URINE, POC: NORMAL
KETONES, POC: NORMAL
LEUKOCYTE EST, POC: NORMAL
NITRITE, POC: NORMAL
PH, POC: 7
PROTEIN, POC: NORMAL
SPECIFIC GRAVITY, POC: 1.02
UROBILINOGEN, POC: 2

## 2024-01-31 RX ORDER — SODIUM CHLORIDE 9 MG/ML
INJECTION, SOLUTION INTRAVENOUS PRN
Status: CANCELLED | OUTPATIENT
Start: 2024-01-31

## 2024-01-31 RX ORDER — SODIUM CHLORIDE 0.9 % (FLUSH) 0.9 %
5-40 SYRINGE (ML) INJECTION PRN
Status: CANCELLED | OUTPATIENT
Start: 2024-01-31

## 2024-01-31 RX ORDER — CIPROFLOXACIN 500 MG/1
500 TABLET, FILM COATED ORAL 2 TIMES DAILY
Qty: 4 TABLET | Refills: 0 | Status: SHIPPED | OUTPATIENT
Start: 2024-01-31

## 2024-01-31 RX ORDER — SODIUM CHLORIDE 0.9 % (FLUSH) 0.9 %
5-40 SYRINGE (ML) INJECTION EVERY 12 HOURS SCHEDULED
Status: CANCELLED | OUTPATIENT
Start: 2024-01-31

## 2024-01-31 ASSESSMENT — ENCOUNTER SYMPTOMS: ABDOMINAL PAIN: 0

## 2024-01-31 NOTE — PROGRESS NOTES
the Total PSA is between 3.00 and 10.00 ng/mL, consider  requesting a Free PSA to aid in diagnosis.         Uroflow: 10 ml/sec, vol 122 cc  post void residual by U/S: 47 cc      Assessment:      This is a 72 yo male with DM, HTN, ED, and with BPH by exam and mild LUTs and an elevated PSA that has risen since last checked. He has mild LUTs and good bladder emptying with no evidence for UTI by U/A. He is not interested in a BPH medication trial. I recommend he consider a prostate biopsy for the elevated PSA. The options of prostate MRI with fusion biopsy vs Trus guided biopsy was discussed and he wants the latter. The risks and benefits of ultrasound guided prostate biopsy including but not limited to infection, sepsis, prostatitis, pain, bleeding, transfusion, need for catheter and pt is willing to proceed. He understands about 3-5% risk of infectious complications and possible hematuria, hematochezia and hematospermia after procedure. Pt was instructed to stop all anticoagulants including ASA, Nsaids, vitamins and herbals. He will use a fleet enema to day of procedure and will start po antibiotics the day prior for three days total. He understands the approx 25% risk of prostate cancer based on his current PSA. He also understands the possibility of cancer progression with an increased risk for morbidity and mortality without diagnosis.      Plan:      Trus with prostate biopsies on 2/12/24, MAC  2. Cipro 500 mg po BID, #4 start day prior and resume day after  3. No ASA, NSAIDs, Vits or herbals 2 weeks prior  4. Levaquin 500 mg IV pre-op  5. Printed info on procedure given and signed  6. F/U 2 weeks after to review results  7. Standard PAT        Barrie Gifford MD

## 2024-02-01 ENCOUNTER — PREP FOR PROCEDURE (OUTPATIENT)
Dept: SURGERY | Age: 74
End: 2024-02-01

## 2024-02-01 RX ORDER — SODIUM CHLORIDE 9 MG/ML
INJECTION, SOLUTION INTRAVENOUS PRN
Status: CANCELLED | OUTPATIENT
Start: 2024-02-01

## 2024-02-01 RX ORDER — SODIUM CHLORIDE 0.9 % (FLUSH) 0.9 %
5-40 SYRINGE (ML) INJECTION PRN
Status: CANCELLED | OUTPATIENT
Start: 2024-02-01

## 2024-02-01 RX ORDER — SODIUM CHLORIDE 0.9 % (FLUSH) 0.9 %
5-40 SYRINGE (ML) INJECTION EVERY 12 HOURS SCHEDULED
Status: CANCELLED | OUTPATIENT
Start: 2024-02-01

## 2024-02-07 ENCOUNTER — PREP FOR PROCEDURE (OUTPATIENT)
Dept: UROLOGY | Age: 74
End: 2024-02-07

## 2024-02-07 DIAGNOSIS — N40.0 ENLARGED PROSTATE: ICD-10-CM

## 2024-02-07 RX ORDER — SODIUM CHLORIDE 0.9 % (FLUSH) 0.9 %
5-40 SYRINGE (ML) INJECTION PRN
Status: CANCELLED | OUTPATIENT
Start: 2024-02-07

## 2024-02-07 RX ORDER — SODIUM CHLORIDE 9 MG/ML
INJECTION, SOLUTION INTRAVENOUS PRN
Status: CANCELLED | OUTPATIENT
Start: 2024-02-07

## 2024-02-07 RX ORDER — SODIUM CHLORIDE 0.9 % (FLUSH) 0.9 %
5-40 SYRINGE (ML) INJECTION EVERY 12 HOURS SCHEDULED
Status: CANCELLED | OUTPATIENT
Start: 2024-02-07

## 2024-02-08 ENCOUNTER — HOSPITAL ENCOUNTER (OUTPATIENT)
Dept: PREADMISSION TESTING | Age: 74
Discharge: HOME | End: 2024-02-08

## 2024-02-26 ENCOUNTER — OFFICE VISIT (OUTPATIENT)
Dept: FAMILY MEDICINE CLINIC | Age: 74
End: 2024-02-26
Payer: MEDICARE

## 2024-02-26 ENCOUNTER — CARE COORDINATION (OUTPATIENT)
Dept: CARE COORDINATION | Age: 74
End: 2024-02-26

## 2024-02-26 VITALS
SYSTOLIC BLOOD PRESSURE: 128 MMHG | HEART RATE: 54 BPM | HEIGHT: 66 IN | DIASTOLIC BLOOD PRESSURE: 76 MMHG | BODY MASS INDEX: 39.7 KG/M2 | OXYGEN SATURATION: 97 % | RESPIRATION RATE: 16 BRPM | WEIGHT: 247 LBS

## 2024-02-26 DIAGNOSIS — Z20.2 STD EXPOSURE: ICD-10-CM

## 2024-02-26 DIAGNOSIS — I10 ESSENTIAL HYPERTENSION, BENIGN: ICD-10-CM

## 2024-02-26 DIAGNOSIS — E66.01 SEVERE OBESITY (BMI 35.0-39.9) WITH COMORBIDITY (HCC): Primary | ICD-10-CM

## 2024-02-26 DIAGNOSIS — H40.1133 PRIMARY OPEN ANGLE GLAUCOMA (POAG) OF BOTH EYES, SEVERE STAGE: ICD-10-CM

## 2024-02-26 DIAGNOSIS — E53.8 B12 DEFICIENCY: ICD-10-CM

## 2024-02-26 DIAGNOSIS — Z76.0 MEDICATION REFILL: ICD-10-CM

## 2024-02-26 DIAGNOSIS — E78.5 DYSLIPIDEMIA: ICD-10-CM

## 2024-02-26 DIAGNOSIS — E55.9 VITAMIN D DEFICIENCY: ICD-10-CM

## 2024-02-26 DIAGNOSIS — N52.2 DRUG-INDUCED ERECTILE DYSFUNCTION: ICD-10-CM

## 2024-02-26 PROCEDURE — 3078F DIAST BP <80 MM HG: CPT | Performed by: PHYSICIAN ASSISTANT

## 2024-02-26 PROCEDURE — 1036F TOBACCO NON-USER: CPT | Performed by: PHYSICIAN ASSISTANT

## 2024-02-26 PROCEDURE — G8427 DOCREV CUR MEDS BY ELIG CLIN: HCPCS | Performed by: PHYSICIAN ASSISTANT

## 2024-02-26 PROCEDURE — 99214 OFFICE O/P EST MOD 30 MIN: CPT | Performed by: PHYSICIAN ASSISTANT

## 2024-02-26 PROCEDURE — 1123F ACP DISCUSS/DSCN MKR DOCD: CPT | Performed by: PHYSICIAN ASSISTANT

## 2024-02-26 PROCEDURE — 3017F COLORECTAL CA SCREEN DOC REV: CPT | Performed by: PHYSICIAN ASSISTANT

## 2024-02-26 PROCEDURE — G8417 CALC BMI ABV UP PARAM F/U: HCPCS | Performed by: PHYSICIAN ASSISTANT

## 2024-02-26 PROCEDURE — 3074F SYST BP LT 130 MM HG: CPT | Performed by: PHYSICIAN ASSISTANT

## 2024-02-26 PROCEDURE — G8484 FLU IMMUNIZE NO ADMIN: HCPCS | Performed by: PHYSICIAN ASSISTANT

## 2024-02-26 RX ORDER — GINSENG 100 MG
CAPSULE ORAL
Qty: 28 G | Refills: 0 | Status: SHIPPED | OUTPATIENT
Start: 2024-02-26

## 2024-02-26 RX ORDER — LISINOPRIL 20 MG/1
20 TABLET ORAL DAILY
Qty: 90 TABLET | Refills: 4 | Status: SHIPPED | OUTPATIENT
Start: 2024-02-26

## 2024-02-26 RX ORDER — SILDENAFIL 100 MG/1
TABLET, FILM COATED ORAL
Qty: 18 TABLET | Refills: 2 | Status: SHIPPED | OUTPATIENT
Start: 2024-02-26

## 2024-02-26 RX ORDER — SIMVASTATIN 20 MG
20 TABLET ORAL NIGHTLY
Qty: 90 TABLET | Refills: 4 | Status: SHIPPED | OUTPATIENT
Start: 2024-02-26

## 2024-02-26 RX ORDER — AMLODIPINE BESYLATE 10 MG/1
10 TABLET ORAL DAILY
Qty: 90 TABLET | Refills: 4 | Status: SHIPPED | OUTPATIENT
Start: 2024-02-26

## 2024-02-26 RX ORDER — CIPROFLOXACIN 500 MG/1
500 TABLET, FILM COATED ORAL 2 TIMES DAILY
Qty: 4 TABLET | Refills: 0 | Status: SHIPPED | OUTPATIENT
Start: 2024-02-26

## 2024-02-26 RX ORDER — HYDROCHLOROTHIAZIDE 12.5 MG/1
12.5 TABLET ORAL DAILY
Qty: 90 TABLET | Refills: 4 | Status: SHIPPED | OUTPATIENT
Start: 2024-02-26

## 2024-02-26 RX ORDER — CHOLECALCIFEROL (VITAMIN D3) 125 MCG
5000 CAPSULE ORAL DAILY
Qty: 90 CAPSULE | Refills: 4 | Status: SHIPPED | OUTPATIENT
Start: 2024-02-26

## 2024-02-26 RX ORDER — LANOLIN ALCOHOL/MO/W.PET/CERES
1000 CREAM (GRAM) TOPICAL DAILY
Qty: 90 TABLET | Refills: 4 | Status: SHIPPED | OUTPATIENT
Start: 2024-02-26 | End: 2025-02-25

## 2024-02-26 RX ORDER — ASPIRIN 81 MG/1
81 TABLET ORAL DAILY
Qty: 90 TABLET | Refills: 4 | Status: SHIPPED | OUTPATIENT
Start: 2024-02-26

## 2024-02-26 SDOH — ECONOMIC STABILITY: INCOME INSECURITY: HOW HARD IS IT FOR YOU TO PAY FOR THE VERY BASICS LIKE FOOD, HOUSING, MEDICAL CARE, AND HEATING?: NOT HARD AT ALL

## 2024-02-26 SDOH — ECONOMIC STABILITY: FOOD INSECURITY: WITHIN THE PAST 12 MONTHS, YOU WORRIED THAT YOUR FOOD WOULD RUN OUT BEFORE YOU GOT MONEY TO BUY MORE.: NEVER TRUE

## 2024-02-26 SDOH — ECONOMIC STABILITY: FOOD INSECURITY: WITHIN THE PAST 12 MONTHS, THE FOOD YOU BOUGHT JUST DIDN'T LAST AND YOU DIDN'T HAVE MONEY TO GET MORE.: NEVER TRUE

## 2024-02-26 ASSESSMENT — PATIENT HEALTH QUESTIONNAIRE - PHQ9
SUM OF ALL RESPONSES TO PHQ QUESTIONS 1-9: 0
2. FEELING DOWN, DEPRESSED OR HOPELESS: 0
1. LITTLE INTEREST OR PLEASURE IN DOING THINGS: 0
SUM OF ALL RESPONSES TO PHQ QUESTIONS 1-9: 0

## 2024-02-26 ASSESSMENT — ENCOUNTER SYMPTOMS
EYE ITCHING: 0
EYE REDNESS: 0
ABDOMINAL PAIN: 0
EYE DISCHARGE: 0
EYE PAIN: 0
CHEST TIGHTNESS: 0
SHORTNESS OF BREATH: 0
PHOTOPHOBIA: 0
ABDOMINAL DISTENTION: 0
COLOR CHANGE: 0

## 2024-02-26 NOTE — PROGRESS NOTES
22.3-6.8 MG/ML ophthalmic solution Use 1 Drop in both eyes twice daily.       No current facility-administered medications for this visit.     PMH, Surgical Hx, Family Hx, and Social Hx reviewed and updated.  Health Maintenance reviewed.    Objective  Vitals:    02/26/24 1304 02/26/24 1323   BP: (!) 142/100 128/76   Site: Left Upper Arm Left Upper Arm   Position: Sitting Sitting   Cuff Size: Large Adult    Pulse: 54    Resp: 16    SpO2: 97%    Weight: 112 kg (247 lb)    Height: 1.676 m (5' 6\")      BP Readings from Last 3 Encounters:   02/26/24 128/76   01/31/24 130/82   01/08/24 138/82     Wt Readings from Last 3 Encounters:   02/26/24 112 kg (247 lb)   01/31/24 117.9 kg (260 lb)   01/08/24 117.9 kg (260 lb)     Physical Exam  Constitutional:       General: He is not in acute distress.     Appearance: He is well-developed. He is obese. He is not diaphoretic.      Comments: Obese male. Sitting comfortably in exam room.  In a great mood.    HENT:      Head: Normocephalic and atraumatic.      Right Ear: Hearing and external ear normal.      Left Ear: Hearing and external ear normal.      Nose: Nose normal.      Mouth/Throat:      Pharynx: No oropharyngeal exudate.   Eyes:      Conjunctiva/sclera: Conjunctivae normal.      Comments: Wearing glasses.    Cardiovascular:      Rate and Rhythm: Normal rate and regular rhythm.      Heart sounds: Normal heart sounds. No murmur heard.  Pulmonary:      Effort: Pulmonary effort is normal. No respiratory distress.      Breath sounds: Normal breath sounds. No wheezing or rales.   Musculoskeletal:      Cervical back: Normal range of motion and neck supple.   Lymphadenopathy:      Cervical: No cervical adenopathy.   Skin:     General: Skin is warm and dry.   Neurological:      General: No focal deficit present.      Mental Status: He is alert and oriented to person, place, and time.      Cranial Nerves: No cranial nerve deficit.   Psychiatric:         Attention and Perception:

## 2024-02-26 NOTE — CARE COORDINATION
Case referred to this writer about by Nandini Cao PA-C to assist patient with HHA.  Telephone call with patient who clarired that he needs help with housekeeping and not HHA.  Dicussed that he has been denied PASSPORT Program in the past.  His name is on the list for housekeeping services with [facility] Office on Aging.  Discussed option of private hire agency assistance.  Patient could not locate lists sent to him previously.  Discussed plan to mail him out lists of private hire agencies.   Please call family with the following:  · I am glad she has adjusted to Depakote without difficulty.  Weight gain is a potential side effect.  My hope is that the weight gain will level off as her body gets used to the medication.  However, I would try and encourage healthy portions of meals along with monitoring the type of food she eats.  In addition exercise may be of help.  · Blood work requisition forms placed in EMR please let family know trough levels need to be drawn.  · I would like to see the family back in clinic.  I can add them to my schedule on 11/23 at 1 PM in clinic [place] office.  At that time we can discuss options for stimulant medications and overall response to seizure medication and determine plan going forward

## 2024-02-27 DIAGNOSIS — Z20.2 STD EXPOSURE: ICD-10-CM

## 2024-02-27 DIAGNOSIS — E66.01 SEVERE OBESITY (BMI 35.0-39.9) WITH COMORBIDITY (HCC): ICD-10-CM

## 2024-02-27 DIAGNOSIS — E53.8 B12 DEFICIENCY: ICD-10-CM

## 2024-02-27 DIAGNOSIS — E55.9 VITAMIN D DEFICIENCY: ICD-10-CM

## 2024-02-27 DIAGNOSIS — I10 ESSENTIAL HYPERTENSION, BENIGN: ICD-10-CM

## 2024-02-27 DIAGNOSIS — E78.5 DYSLIPIDEMIA: ICD-10-CM

## 2024-02-27 LAB
ALBUMIN SERPL-MCNC: 4.1 G/DL (ref 3.5–4.6)
ALP SERPL-CCNC: 73 U/L (ref 35–104)
ALT SERPL-CCNC: 10 U/L (ref 0–41)
ANION GAP SERPL CALCULATED.3IONS-SCNC: 7 MEQ/L (ref 9–15)
AST SERPL-CCNC: 14 U/L (ref 0–40)
BILIRUB SERPL-MCNC: 0.8 MG/DL (ref 0.2–0.7)
BUN SERPL-MCNC: 13 MG/DL (ref 8–23)
CALCIUM SERPL-MCNC: 10.3 MG/DL (ref 8.5–9.9)
CHLORIDE SERPL-SCNC: 105 MEQ/L (ref 95–107)
CHOLEST SERPL-MCNC: 179 MG/DL (ref 0–199)
CO2 SERPL-SCNC: 29 MEQ/L (ref 20–31)
CREAT SERPL-MCNC: 0.85 MG/DL (ref 0.7–1.2)
ERYTHROCYTE [DISTWIDTH] IN BLOOD BY AUTOMATED COUNT: 12 % (ref 11.5–14.5)
FOLATE: 12 NG/ML (ref 4.8–24.2)
GLOBULIN SER CALC-MCNC: 3.3 G/DL (ref 2.3–3.5)
GLUCOSE SERPL-MCNC: 106 MG/DL (ref 70–99)
HCT VFR BLD AUTO: 46.7 % (ref 42–52)
HDLC SERPL-MCNC: 46 MG/DL (ref 40–59)
HGB BLD-MCNC: 14.3 G/DL (ref 14–18)
LDL CHOLESTEROL CALCULATED: 111 MG/DL (ref 0–129)
MCH RBC QN AUTO: 27.9 PG (ref 27–31.3)
MCHC RBC AUTO-ENTMCNC: 30.6 % (ref 33–37)
MCV RBC AUTO: 91 FL (ref 79–92.2)
PLATELET # BLD AUTO: 315 K/UL (ref 130–400)
POTASSIUM SERPL-SCNC: 4.1 MEQ/L (ref 3.4–4.9)
PROT SERPL-MCNC: 7.4 G/DL (ref 6.3–8)
RBC # BLD AUTO: 5.13 M/UL (ref 4.7–6.1)
SODIUM SERPL-SCNC: 141 MEQ/L (ref 135–144)
TRIGLYCERIDE, FASTING: 112 MG/DL (ref 0–150)
VITAMIN B-12: 1089 PG/ML (ref 232–1245)
VITAMIN D 25-HYDROXY: 28.4 NG/ML (ref 30–100)
WBC # BLD AUTO: 5.9 K/UL (ref 4.8–10.8)

## 2024-02-29 LAB
SPECIMEN SOURCE: NORMAL
T VAGINALIS RRNA SPEC QL NAA+PROBE: NEGATIVE

## 2024-03-01 LAB
C TRACH DNA UR QL NAA+PROBE: NEGATIVE
N GONORRHOEA DNA UR QL NAA+PROBE: NEGATIVE

## 2024-03-04 ENCOUNTER — CARE COORDINATION (OUTPATIENT)
Dept: CARE COORDINATION | Age: 74
End: 2024-03-04

## 2024-03-04 NOTE — CARE COORDINATION
Telephone call to patient.  He indicated was not a good time to talk and expressed plan to return phone call  at a better time.

## 2024-03-05 ENCOUNTER — CARE COORDINATION (OUTPATIENT)
Dept: CARE COORDINATION | Age: 74
End: 2024-03-05

## 2024-03-12 ENCOUNTER — CARE COORDINATION (OUTPATIENT)
Dept: CARE COORDINATION | Age: 74
End: 2024-03-12

## 2024-03-12 NOTE — CARE COORDINATION
Telephone call with patient. He just got the list of private hire agencies sent to them.  He is going to start making calls.  He called Nandini Delarosaid would be 125 dollars an hour for two people,.  He is asking for this writer to check on where he is at on waiting list for housekeeping with [facility] Office on Aging.

## 2024-03-12 NOTE — CARE COORDINATION
Telephone call to [facility] Office on Aging.  Left voicemail of Tina/housekeeping to return phone call regarding status of waiting list for housekeeping.

## 2024-03-13 ENCOUNTER — CARE COORDINATION (OUTPATIENT)
Dept: CARE COORDINATION | Age: 74
End: 2024-03-13

## 2024-03-13 NOTE — CARE COORDINATION
Telephone call with patient.  Relayed result of phone call with [facility] Office on Aging.  He is going to make some phone calls from lists sent to him by this writer.

## 2024-03-13 NOTE — CARE COORDINATION
Telephone call to [facility] Office on Aging/Housekeeping Program.  She stated that patient's name has been on list from 2023 and they are just now finishing up 2022 enrollments.  She estimated that patient is at list 60 on the waiting list for housekeeping program

## 2024-03-19 ENCOUNTER — CARE COORDINATION (OUTPATIENT)
Dept: CARE COORDINATION | Age: 74
End: 2024-03-19

## 2024-03-19 NOTE — CARE COORDINATION
Telephone call to patient.  Left voicemail of purpose of call with request for return phone call.  Call back number was provided.

## 2024-03-19 NOTE — CARE COORDINATION
Returning patient's voicemail message.  Left voicemail returning call.  Provided call back number.

## 2024-03-26 ENCOUNTER — CARE COORDINATION (OUTPATIENT)
Dept: CARE COORDINATION | Age: 74
End: 2024-03-26

## 2024-03-26 NOTE — CARE COORDINATION
Telephone to patient.  Left voicemail of reason for call with request for return phone call. Call back number was provided.

## 2024-04-02 ENCOUNTER — CARE COORDINATION (OUTPATIENT)
Dept: CARE COORDINATION | Age: 74
End: 2024-04-02

## 2024-04-02 NOTE — CARE COORDINATION
Telephone call with patient.  He did make phone calls to private hire agencies and felt he could not afford.  He is going to try and  find someone on his own. Discussed that he is on waiting list for housekeeping with [facility] Office on Aging.

## 2024-04-09 ENCOUNTER — CARE COORDINATION (OUTPATIENT)
Dept: CARE COORDINATION | Age: 74
End: 2024-04-09

## 2024-04-16 ENCOUNTER — CARE COORDINATION (OUTPATIENT)
Dept: CARE COORDINATION | Age: 74
End: 2024-04-16

## 2024-04-16 NOTE — CARE COORDINATION
Telephone call with patient.  He is still looking for someone to do housekeeping at a reasonable price.  Discussed that his name is on waiting list for housekeeping with [facility] Office on Aging. Discussed meals-on-wheels and he doesn't eat pork or beef.  He buys microwave meals.  He is driving but not a night.  No other concerns or needs were expressed at time of phone call.

## 2024-04-23 ENCOUNTER — CARE COORDINATION (OUTPATIENT)
Dept: CARE COORDINATION | Age: 74
End: 2024-04-23

## 2024-04-23 NOTE — CARE COORDINATION
Telephone call to patient.  Relayed results of phone call with [facility] Office on Aging.  Explained he should be hearing from them for update on waiting list.

## 2024-04-23 NOTE — CARE COORDINATION
Telephone call to [facility] Office on Aging/Tina.  She confirmed patient's name has been on list since 2022.  They are in process of calling people on waiting list for update.  Provided Tina  new contact information for patient.

## 2024-04-30 ENCOUNTER — CARE COORDINATION (OUTPATIENT)
Dept: CARE COORDINATION | Age: 74
End: 2024-04-30

## 2024-04-30 NOTE — CARE COORDINATION
Telephone call to patient.  He calls a person he know to come in when he has the money. Discussed meals-on-wheels again and waiting list involved,  He does not eat fish or pork.

## 2024-05-07 ENCOUNTER — CARE COORDINATION (OUTPATIENT)
Dept: CARE COORDINATION | Age: 74
End: 2024-05-07

## 2024-05-07 NOTE — CARE COORDINATION
Telephone call to patient.  Left voicemail of purpose of call with request for return phone call.  Call back number was provided,.

## 2024-05-14 ENCOUNTER — CARE COORDINATION (OUTPATIENT)
Dept: CARE COORDINATION | Age: 74
End: 2024-05-14

## 2024-05-14 NOTE — CARE COORDINATION
to patient.  He found a neighbor down the street to to housekeeping. He was out driving.  No other concerns were expressed at time of phone call.

## 2024-05-21 ENCOUNTER — CARE COORDINATION (OUTPATIENT)
Dept: CARE COORDINATION | Age: 74
End: 2024-05-21

## 2024-05-28 ENCOUNTER — CARE COORDINATION (OUTPATIENT)
Dept: CARE COORDINATION | Age: 74
End: 2024-05-28

## 2024-06-04 ENCOUNTER — CARE COORDINATION (OUTPATIENT)
Dept: CARE COORDINATION | Age: 74
End: 2024-06-04

## 2024-06-11 ENCOUNTER — CARE COORDINATION (OUTPATIENT)
Dept: CARE COORDINATION | Age: 74
End: 2024-06-11

## 2024-06-11 NOTE — CARE COORDINATION
Telephone to patient.  Left voicemail of purpose of call with request for return phone call.  Call back number was provided.

## 2024-06-18 ENCOUNTER — CARE COORDINATION (OUTPATIENT)
Dept: CARE COORDINATION | Age: 74
End: 2024-06-18

## 2024-06-18 NOTE — CARE COORDINATION
Telephone call to patient.  Left voicemail this will be final call as unable to reach him. Will discharge patient from [facility]/.  Left phone number for future reference.

## 2024-06-24 ENCOUNTER — OFFICE VISIT (OUTPATIENT)
Dept: FAMILY MEDICINE CLINIC | Age: 74
End: 2024-06-24
Payer: MEDICARE

## 2024-06-24 VITALS
DIASTOLIC BLOOD PRESSURE: 78 MMHG | BODY MASS INDEX: 36.68 KG/M2 | WEIGHT: 242 LBS | OXYGEN SATURATION: 96 % | SYSTOLIC BLOOD PRESSURE: 106 MMHG | HEART RATE: 47 BPM | HEIGHT: 68 IN

## 2024-06-24 DIAGNOSIS — N50.89 GENITAL LESION, MALE: Primary | ICD-10-CM

## 2024-06-24 DIAGNOSIS — N50.89 GENITAL LESION, MALE: ICD-10-CM

## 2024-06-24 PROCEDURE — G8427 DOCREV CUR MEDS BY ELIG CLIN: HCPCS | Performed by: NURSE PRACTITIONER

## 2024-06-24 PROCEDURE — 1123F ACP DISCUSS/DSCN MKR DOCD: CPT | Performed by: NURSE PRACTITIONER

## 2024-06-24 PROCEDURE — 3074F SYST BP LT 130 MM HG: CPT | Performed by: NURSE PRACTITIONER

## 2024-06-24 PROCEDURE — 3017F COLORECTAL CA SCREEN DOC REV: CPT | Performed by: NURSE PRACTITIONER

## 2024-06-24 PROCEDURE — 99213 OFFICE O/P EST LOW 20 MIN: CPT | Performed by: NURSE PRACTITIONER

## 2024-06-24 PROCEDURE — 1036F TOBACCO NON-USER: CPT | Performed by: NURSE PRACTITIONER

## 2024-06-24 PROCEDURE — G8417 CALC BMI ABV UP PARAM F/U: HCPCS | Performed by: NURSE PRACTITIONER

## 2024-06-24 PROCEDURE — 3078F DIAST BP <80 MM HG: CPT | Performed by: NURSE PRACTITIONER

## 2024-06-24 ASSESSMENT — ENCOUNTER SYMPTOMS
NAUSEA: 0
ABDOMINAL PAIN: 0

## 2024-06-24 NOTE — PROGRESS NOTES
Subjective  Marcell Qureshi, 74 y.o. male presents today with:  Chief Complaint   Patient presents with    Exposure to STD     Possible sti. Went urgent care on Saturday but they told him to come here because they can't perform testing there. Also has bump on penis, noticed it last week, no pain. They did a ua but no std tests they suggest syphillis test       HPI  Presents to  for genital lesion   One week ago noted lesion   Site does not bother him   Sexually active   No drainage or bleeding from the lesion  Site does not burn nor itch               Past Medical History:   Diagnosis Date    Diabetes mellitus, type 2 (HCC) 10/11/2011    pt relates he was borderline at one time and now denies being diabetic and relates his physician has not prescribed any meds or blood sugar monitoring    Dyslipidemia 10/11/2011    Erectile dysfunction 7/19/2012    Essential hypertension, benign 10/11/2011    Glaucoma     History of colon polyps     adenomatous polyps      Past Surgical History:   Procedure Laterality Date    CATARACT REMOVAL Right 2/17/15    [facility]    COLONOSCOPY  9/26/11    Dr Browning    COLONOSCOPY N/A 11/11/2016    COLONOSCOPY performed by Sebas Browning MD at [facility]    COLONOSCOPY N/A 12/7/2020    COLONOSCOPY WITH POLYPECTOMY performed by Toro Painter MD at [facility]    TRABECULECTOMY Right 5/1/14    [facility]     No family history on file.          Review of Systems   Constitutional:  Negative for activity change, appetite change and chills.   Gastrointestinal:  Negative for abdominal pain and nausea.   Genitourinary:  Positive for genital sores. Negative for decreased urine volume, difficulty urinating, dysuria, frequency, hematuria, penile discharge, penile pain, penile swelling, scrotal swelling, testicular pain and urgency.   Musculoskeletal:  Negative for neck stiffness.         PMH, Surgical Hx, Family Hx, and Social Hx reviewed and updated.  Health Maintenance

## 2024-06-25 LAB — RPR SER QL: NORMAL

## 2024-06-27 LAB
FINAL REPORT: NORMAL
PRELIMINARY: NORMAL

## 2024-07-01 DIAGNOSIS — N50.89 GENITAL LESION, MALE: Primary | ICD-10-CM

## 2024-07-01 RX ORDER — IMIQUIMOD 12.5 MG/.25G
CREAM TOPICAL
Qty: 24 EACH | Refills: 0 | Status: SHIPPED | OUTPATIENT
Start: 2024-07-01

## 2024-08-07 DIAGNOSIS — N52.2 DRUG-INDUCED ERECTILE DYSFUNCTION: ICD-10-CM

## 2024-08-07 NOTE — TELEPHONE ENCOUNTER
Comments:     Last Office Visit (last PCP visit):   2/26/2024    Next Visit Date:  Future Appointments   Date Time Provider Department Center   8/26/2024  1:00 PM Nandini Cao PA-C [facility]       **If hasn't been seen in over a year OR hasn't followed up according to last diabetes/ADHD visit, make appointment for patient before sending refill to provider.    Rx requested:  Requested Prescriptions     Pending Prescriptions Disp Refills    sildenafil (VIAGRA) 100 MG tablet [Pharmacy Med Name: SILDENAFIL 100MG TABLETS] 18 tablet 2     Sig: TAKE 1 TABLET BY MOUTH 60 MINUTES BEFORE SEXUAL INTERCOURSE IF NEEDED

## 2024-08-08 RX ORDER — SILDENAFIL 100 MG/1
TABLET, FILM COATED ORAL
Qty: 18 TABLET | Refills: 2 | Status: SHIPPED | OUTPATIENT
Start: 2024-08-08

## 2024-08-16 ENCOUNTER — OFFICE VISIT (OUTPATIENT)
Dept: FAMILY MEDICINE CLINIC | Age: 74
End: 2024-08-16
Payer: MEDICARE

## 2024-08-16 VITALS
DIASTOLIC BLOOD PRESSURE: 84 MMHG | OXYGEN SATURATION: 98 % | HEART RATE: 52 BPM | BODY MASS INDEX: 36.8 KG/M2 | TEMPERATURE: 96.9 F | SYSTOLIC BLOOD PRESSURE: 124 MMHG | HEIGHT: 68 IN

## 2024-08-16 DIAGNOSIS — N48.89 PENILE IRRITATION: ICD-10-CM

## 2024-08-16 DIAGNOSIS — Z11.3 SCREEN FOR STD (SEXUALLY TRANSMITTED DISEASE): ICD-10-CM

## 2024-08-16 DIAGNOSIS — N48.89 SORE OF PENIS: Primary | ICD-10-CM

## 2024-08-16 DIAGNOSIS — N48.89 SORE OF PENIS: ICD-10-CM

## 2024-08-16 PROCEDURE — 3017F COLORECTAL CA SCREEN DOC REV: CPT | Performed by: NURSE PRACTITIONER

## 2024-08-16 PROCEDURE — 1123F ACP DISCUSS/DSCN MKR DOCD: CPT | Performed by: NURSE PRACTITIONER

## 2024-08-16 PROCEDURE — 99213 OFFICE O/P EST LOW 20 MIN: CPT | Performed by: NURSE PRACTITIONER

## 2024-08-16 PROCEDURE — 3074F SYST BP LT 130 MM HG: CPT | Performed by: NURSE PRACTITIONER

## 2024-08-16 PROCEDURE — 1036F TOBACCO NON-USER: CPT | Performed by: NURSE PRACTITIONER

## 2024-08-16 PROCEDURE — 3079F DIAST BP 80-89 MM HG: CPT | Performed by: NURSE PRACTITIONER

## 2024-08-16 PROCEDURE — G8428 CUR MEDS NOT DOCUMENT: HCPCS | Performed by: NURSE PRACTITIONER

## 2024-08-16 PROCEDURE — G8417 CALC BMI ABV UP PARAM F/U: HCPCS | Performed by: NURSE PRACTITIONER

## 2024-08-16 ASSESSMENT — ENCOUNTER SYMPTOMS
DIARRHEA: 0
NAUSEA: 0
VOMITING: 0
COLOR CHANGE: 1
ABDOMINAL PAIN: 0

## 2024-08-16 NOTE — PROGRESS NOTES
Subjective  Marcell Qureshi, 74 y.o. male presents today with:  Chief Complaint   Patient presents with    Penis Pain      Redness, sores on penis       HPI  Presents to  for penile sores   Affected site: glans penis   Sites are weeping fluid, serosanguineous   Site is burning   Genital warts had cleared. Applied Aldara cream. Discussed with pt his current issue is different condition & not the genital wart he had prior. He does not agree with this statement.   Denies dysuria   Denies difficulty urinating   Denies fever or chills                      Past Medical History:   Diagnosis Date    Diabetes mellitus, type 2 (HCC) 10/11/2011    pt relates he was borderline at one time and now denies being diabetic and relates his physician has not prescribed any meds or blood sugar monitoring    Dyslipidemia 10/11/2011    Erectile dysfunction 7/19/2012    Essential hypertension, benign 10/11/2011    Glaucoma     History of colon polyps     adenomatous polyps      Past Surgical History:   Procedure Laterality Date    CATARACT REMOVAL Right 2/17/15    [facility]    COLONOSCOPY  9/26/11    Dr Browning    COLONOSCOPY N/A 11/11/2016    COLONOSCOPY performed by Sebas Browning MD at [facility]    COLONOSCOPY N/A 12/7/2020    COLONOSCOPY WITH POLYPECTOMY performed by Toro Painter MD at [facility]    TRABECULECTOMY Right 5/1/14    [facility]     No family history on file.            Review of Systems   Constitutional:  Negative for appetite change, chills and fever.   Gastrointestinal:  Negative for abdominal pain, diarrhea, nausea and vomiting.   Genitourinary:  Positive for genital sores, penile discharge (from glans penis not urethra), penile pain and penile swelling. Negative for decreased urine volume, difficulty urinating, dysuria, flank pain, frequency, hematuria, scrotal swelling, testicular pain and urgency.   Skin:  Positive for color change.   Neurological:  Negative for dizziness, weakness,

## 2024-08-17 LAB — RPR SER QL: NORMAL

## 2024-08-19 LAB
HSV1 DNA CSF QL NAA+PROBE: NOT DETECTED
HSV2 DNA CSF QL NAA+PROBE: NOT DETECTED
SPECIMEN SOURCE: NORMAL

## 2024-08-19 RX ORDER — FLUCONAZOLE 100 MG/1
100 TABLET ORAL DAILY
Qty: 14 TABLET | Refills: 0 | Status: SHIPPED | OUTPATIENT
Start: 2024-08-19 | End: 2024-09-02

## 2024-08-19 RX ORDER — NYSTATIN 100000 U/G
CREAM TOPICAL
Qty: 60 EACH | Refills: 0 | Status: SHIPPED | OUTPATIENT
Start: 2024-08-19 | End: 2024-09-02

## 2024-08-20 LAB
REASON FOR REJECTION: NORMAL
REJECTED TEST: NORMAL

## 2024-08-21 ENCOUNTER — TELEPHONE (OUTPATIENT)
Dept: FAMILY MEDICINE CLINIC | Age: 74
End: 2024-08-21

## 2024-08-21 NOTE — TELEPHONE ENCOUNTER
[facility] states they were not able to do the Trichomonas. It was sent in the wrong tube.    Please advise

## 2024-08-23 LAB
SPECIMEN SOURCE: NORMAL
T VAGINALIS RRNA SPEC QL NAA+PROBE: NEGATIVE

## 2024-08-26 ENCOUNTER — OFFICE VISIT (OUTPATIENT)
Dept: FAMILY MEDICINE CLINIC | Age: 74
End: 2024-08-26
Payer: MEDICARE

## 2024-08-26 VITALS
OXYGEN SATURATION: 98 % | DIASTOLIC BLOOD PRESSURE: 70 MMHG | SYSTOLIC BLOOD PRESSURE: 120 MMHG | WEIGHT: 235 LBS | HEIGHT: 68 IN | RESPIRATION RATE: 16 BRPM | HEART RATE: 57 BPM | BODY MASS INDEX: 35.61 KG/M2

## 2024-08-26 VITALS
DIASTOLIC BLOOD PRESSURE: 70 MMHG | SYSTOLIC BLOOD PRESSURE: 120 MMHG | OXYGEN SATURATION: 98 % | HEART RATE: 57 BPM | WEIGHT: 235 LBS | HEIGHT: 68 IN | BODY MASS INDEX: 35.61 KG/M2 | RESPIRATION RATE: 16 BRPM

## 2024-08-26 DIAGNOSIS — E66.01 SEVERE OBESITY (BMI 35.0-39.9) WITH COMORBIDITY (HCC): ICD-10-CM

## 2024-08-26 DIAGNOSIS — H40.1133 PRIMARY OPEN ANGLE GLAUCOMA (POAG) OF BOTH EYES, SEVERE STAGE: ICD-10-CM

## 2024-08-26 DIAGNOSIS — H17.9 CORNEAL SCARS, BOTH EYES: ICD-10-CM

## 2024-08-26 DIAGNOSIS — A63.0 GENITAL WARTS: Primary | ICD-10-CM

## 2024-08-26 DIAGNOSIS — R97.20 ABNORMAL PSA: ICD-10-CM

## 2024-08-26 DIAGNOSIS — Z00.00 MEDICARE ANNUAL WELLNESS VISIT, SUBSEQUENT: Primary | ICD-10-CM

## 2024-08-26 PROBLEM — Z20.2 STD EXPOSURE: Status: RESOLVED | Noted: 2024-02-26 | Resolved: 2024-08-26

## 2024-08-26 PROCEDURE — G0439 PPPS, SUBSEQ VISIT: HCPCS | Performed by: PHYSICIAN ASSISTANT

## 2024-08-26 PROCEDURE — 3078F DIAST BP <80 MM HG: CPT | Performed by: PHYSICIAN ASSISTANT

## 2024-08-26 PROCEDURE — 1036F TOBACCO NON-USER: CPT | Performed by: PHYSICIAN ASSISTANT

## 2024-08-26 PROCEDURE — G8427 DOCREV CUR MEDS BY ELIG CLIN: HCPCS | Performed by: PHYSICIAN ASSISTANT

## 2024-08-26 PROCEDURE — 3074F SYST BP LT 130 MM HG: CPT | Performed by: PHYSICIAN ASSISTANT

## 2024-08-26 PROCEDURE — 99214 OFFICE O/P EST MOD 30 MIN: CPT | Performed by: PHYSICIAN ASSISTANT

## 2024-08-26 PROCEDURE — 1123F ACP DISCUSS/DSCN MKR DOCD: CPT | Performed by: PHYSICIAN ASSISTANT

## 2024-08-26 PROCEDURE — 3017F COLORECTAL CA SCREEN DOC REV: CPT | Performed by: PHYSICIAN ASSISTANT

## 2024-08-26 PROCEDURE — G8417 CALC BMI ABV UP PARAM F/U: HCPCS | Performed by: PHYSICIAN ASSISTANT

## 2024-08-26 RX ORDER — IMIQUIMOD 12.5 MG/.25G
CREAM TOPICAL
Qty: 24 EACH | Refills: 1 | Status: SHIPPED | OUTPATIENT
Start: 2024-08-26 | End: 2024-09-02

## 2024-08-26 ASSESSMENT — PATIENT HEALTH QUESTIONNAIRE - PHQ9
2. FEELING DOWN, DEPRESSED OR HOPELESS: NOT AT ALL
SUM OF ALL RESPONSES TO PHQ9 QUESTIONS 1 & 2: 0
1. LITTLE INTEREST OR PLEASURE IN DOING THINGS: NOT AT ALL
SUM OF ALL RESPONSES TO PHQ QUESTIONS 1-9: 0

## 2024-08-26 ASSESSMENT — ENCOUNTER SYMPTOMS
CHEST TIGHTNESS: 0
COLOR CHANGE: 0
EYE ITCHING: 0
PHOTOPHOBIA: 0
ABDOMINAL DISTENTION: 0
EYE DISCHARGE: 0
EYE PAIN: 0
EYE REDNESS: 0
ABDOMINAL PAIN: 0
SHORTNESS OF BREATH: 0

## 2024-08-26 ASSESSMENT — LIFESTYLE VARIABLES
HOW OFTEN DO YOU HAVE A DRINK CONTAINING ALCOHOL: NEVER
HOW MANY STANDARD DRINKS CONTAINING ALCOHOL DO YOU HAVE ON A TYPICAL DAY: PATIENT DOES NOT DRINK

## 2024-08-26 NOTE — PROGRESS NOTES
Subjective  Marcell Qureshi, 74 y.o. male presents today with:  Chief Complaint   Patient presents with    6 Month Follow-Up     No current concerns        HPI  Last OV with me: 2/26/24.     Recently was seen at [facility] x2 for genital sore/rash.   He was treated with oral abx and then with oral diflucan and nystatin cream.  Overall, he has had improvement.  Still has a lesion on the glans/head.  No dysuria, no pain.  No discharge.  Not currently sexually active.     Remains complaint with his BP medication.    Sleeping well.  No chest pain.  No dizziness.    Exercises 4-5 days/week.     No stress.   Retired.     Elevated PSA-needs to follow up with Dr. Gifford.      Continues with opthalmology for POAG.  Vision remains stable.     Review of Systems   Constitutional:  Negative for activity change, appetite change, chills, diaphoresis, fatigue and fever.   HENT:  Negative for congestion.    Eyes:  Positive for visual disturbance. Negative for photophobia, pain, discharge, redness and itching.   Respiratory:  Negative for chest tightness and shortness of breath.    Cardiovascular:  Negative for chest pain, palpitations and leg swelling.   Gastrointestinal:  Negative for abdominal distention and abdominal pain.   Genitourinary:  Positive for genital sores. Negative for decreased urine volume, difficulty urinating, penile pain, penile swelling, scrotal swelling, testicular pain and urgency.   Skin:  Negative for color change.   Neurological:  Negative for dizziness, light-headedness and headaches.   Psychiatric/Behavioral:  Negative for agitation, dysphoric mood and sleep disturbance. The patient is not nervous/anxious.        Past Medical History:   Diagnosis Date    Diabetes mellitus, type 2 (HCC) 10/11/2011    pt relates he was borderline at one time and now denies being diabetic and relates his physician has not prescribed any meds or blood sugar monitoring    Dyslipidemia 10/11/2011    Erectile

## 2024-08-26 NOTE — PROGRESS NOTES
Medicare Annual Wellness Visit    Marcell Qureshi is here for Medicare AWV    Assessment & Plan   Medicare annual wellness visit, subsequent  Recommendations for Preventive Services Due: see orders and patient instructions/AVS.  Recommended screening schedule for the next 5-10 years is provided to the patient in written form: see Patient Instructions/AVS.     No follow-ups on file.     Subjective       Patient's complete Health Risk Assessment and screening values have been reviewed and are found in Flowsheets. The following problems were reviewed today and where indicated follow up appointments were made and/or referrals ordered.    Positive Risk Factor Screenings with Interventions:                  Abnormal BMI (obese):  Body mass index is 35.73 kg/m². (!) Abnormal  Interventions:  Exercising, eating healthy                            Objective   Vitals:    08/26/24 1310   BP: 120/70   Site: Right Upper Arm   Position: Sitting   Cuff Size: Large Adult   Pulse: 57   Resp: 16   SpO2: 98%   Weight: 106.6 kg (235 lb)   Height: 1.727 m (5' 8\")      Body mass index is 35.73 kg/m².                    Allergies   Allergen Reactions    Apraclonidine      Other reaction(s): Other: See Comments  Irritation, not tolerable     Brimonidine      Other reaction(s): Other: See Comments  Follicles      Prior to Visit Medications    Medication Sig Taking? Authorizing Provider   imiquimod (ALDARA) 5 % cream Apply a thin layer 3 times per week (on alternate days) prior to bedtime; leave on skin for 6 to 10 hours, then remove with mild soap and water.  Nandini Cao PA-C   fluconazole (DIFLUCAN) 100 MG tablet Take 1 tablet by mouth daily for 14 days Indications: Penile Yeast Infection  Leonora Greco APRN - NP   nystatin (MYCOSTATIN) 041289 UNIT/GM cream Indications: Penile Yeast Infection Apply topically 2 times daily.  Leonora Greco APRN - NP   sildenafil (VIAGRA) 100 MG tablet TAKE 1 TABLET BY MOUTH 60 MINUTES

## 2024-10-07 ENCOUNTER — OFFICE VISIT (OUTPATIENT)
Dept: FAMILY MEDICINE CLINIC | Age: 74
End: 2024-10-07
Payer: MEDICARE

## 2024-10-07 VITALS
RESPIRATION RATE: 16 BRPM | OXYGEN SATURATION: 99 % | BODY MASS INDEX: 35.77 KG/M2 | DIASTOLIC BLOOD PRESSURE: 88 MMHG | SYSTOLIC BLOOD PRESSURE: 122 MMHG | HEIGHT: 68 IN | HEART RATE: 50 BPM | WEIGHT: 236 LBS

## 2024-10-07 DIAGNOSIS — N48.9 PENILE LESION: Primary | ICD-10-CM

## 2024-10-07 DIAGNOSIS — A63.0 VENEREAL WART: ICD-10-CM

## 2024-10-07 PROCEDURE — 1123F ACP DISCUSS/DSCN MKR DOCD: CPT | Performed by: PHYSICIAN ASSISTANT

## 2024-10-07 PROCEDURE — G8427 DOCREV CUR MEDS BY ELIG CLIN: HCPCS | Performed by: PHYSICIAN ASSISTANT

## 2024-10-07 PROCEDURE — 3017F COLORECTAL CA SCREEN DOC REV: CPT | Performed by: PHYSICIAN ASSISTANT

## 2024-10-07 PROCEDURE — G8484 FLU IMMUNIZE NO ADMIN: HCPCS | Performed by: PHYSICIAN ASSISTANT

## 2024-10-07 PROCEDURE — 3079F DIAST BP 80-89 MM HG: CPT | Performed by: PHYSICIAN ASSISTANT

## 2024-10-07 PROCEDURE — 3074F SYST BP LT 130 MM HG: CPT | Performed by: PHYSICIAN ASSISTANT

## 2024-10-07 PROCEDURE — 99214 OFFICE O/P EST MOD 30 MIN: CPT | Performed by: PHYSICIAN ASSISTANT

## 2024-10-07 PROCEDURE — 1036F TOBACCO NON-USER: CPT | Performed by: PHYSICIAN ASSISTANT

## 2024-10-07 PROCEDURE — G8417 CALC BMI ABV UP PARAM F/U: HCPCS | Performed by: PHYSICIAN ASSISTANT

## 2024-10-07 ASSESSMENT — ENCOUNTER SYMPTOMS
CHEST TIGHTNESS: 0
ABDOMINAL DISTENTION: 0
ABDOMINAL PAIN: 0
PHOTOPHOBIA: 0
EYE REDNESS: 0
EYE ITCHING: 0
SHORTNESS OF BREATH: 0
EYE DISCHARGE: 0
EYE PAIN: 0
COLOR CHANGE: 0

## 2024-10-07 NOTE — PROGRESS NOTES
COLONOSCOPY  11    Dr Browning    COLONOSCOPY N/A 2016    COLONOSCOPY performed by Sebas Browning MD at [facility]    COLONOSCOPY N/A 2020    COLONOSCOPY WITH POLYPECTOMY performed by Toro Painter MD at [facility]    TRABECULECTOMY Right 14    [facility]     Social History     Socioeconomic History    Marital status: Single     Spouse name: Not on file    Number of children: Not on file    Years of education: Not on file    Highest education level: Not on file   Occupational History    Not on file   Tobacco Use    Smoking status: Former     Current packs/day: 0.00     Average packs/day: 1.5 packs/day for 30.0 years (45.0 ttl pk-yrs)     Types: Cigarettes     Start date: 1970     Quit date: 2000     Years since quittin.7    Smokeless tobacco: Never   Vaping Use    Vaping status: Never Used   Substance and Sexual Activity    Alcohol use: No    Drug use: Not on file    Sexual activity: Not on file   Other Topics Concern    Not on file   Social History Narrative    Not on file     Social Determinants of Health     Financial Resource Strain: Low Risk  (2024)    Overall Financial Resource Strain (CARDIA)     Difficulty of Paying Living Expenses: Not hard at all   Food Insecurity: No Food Insecurity (2024)    Hunger Vital Sign     Worried About Running Out of Food in the Last Year: Never true     Ran Out of Food in the Last Year: Never true   Transportation Needs: Unknown (2024)    PRAPARE - Transportation     Lack of Transportation (Medical): Not on file     Lack of Transportation (Non-Medical): No   Physical Activity: Sufficiently Active (2024)    Exercise Vital Sign     Days of Exercise per Week: 3 days     Minutes of Exercise per Session: 90 min   Stress: Not on file   Social Connections: Not on file   Intimate Partner Violence: Not on file   Housing Stability: Unknown (2024)    Housing Stability Vital Sign     Unable to Pay for Housing in the

## 2024-10-09 ENCOUNTER — TELEPHONE (OUTPATIENT)
Dept: FAMILY MEDICINE CLINIC | Age: 74
End: 2024-10-09

## 2024-10-09 DIAGNOSIS — N48.9 PENILE LESION: Primary | ICD-10-CM

## 2024-10-09 NOTE — TELEPHONE ENCOUNTER
----- Message from Alhaji ESCOBAR sent at 10/7/2024  4:31 PM EDT -----  Regarding: ECC Message to Provider  ECC Message to Provider    Relationship to Patient: Self     Additional Information Patient wants to know if Nandini Bennett PA-C can refer him to different specialist doctor. He was refer first to Dr. Otf Cordero but this doctor is not available until next year around June or July so he wants to be referred to different doctor. He wants to have the name and phone number of the doctor that will be available in his area.  --------------------------------------------------------------------------------------------------------------------------    Call Back Information: OK to leave message on voicemail  Preferred Call Back Number: Phone  [phone]

## 2024-10-10 ENCOUNTER — TELEPHONE (OUTPATIENT)
Dept: FAMILY MEDICINE CLINIC | Age: 74
End: 2024-10-10

## 2024-10-10 NOTE — TELEPHONE ENCOUNTER
Patient states Dr. Ghulam Del Toro is no longer at the [facility].    Patient is asking for a referral for another doctor.    Please Advise.    Callback # [phone]

## 2024-10-11 NOTE — TELEPHONE ENCOUNTER
Pt states he called this office and they are no longer in [place] they are in [place] and he cannot go out that far he does not drive well due to vision, would like a new referral

## 2024-10-14 ENCOUNTER — OFFICE VISIT (OUTPATIENT)
Dept: FAMILY MEDICINE CLINIC | Age: 74
End: 2024-10-14
Payer: MEDICARE

## 2024-10-14 VITALS — WEIGHT: 236 LBS | TEMPERATURE: 98.6 F | HEIGHT: 68 IN | BODY MASS INDEX: 35.77 KG/M2

## 2024-10-14 DIAGNOSIS — N48.9 LESION OF PENIS: Primary | ICD-10-CM

## 2024-10-14 PROCEDURE — 1036F TOBACCO NON-USER: CPT | Performed by: FAMILY MEDICINE

## 2024-10-14 PROCEDURE — 1123F ACP DISCUSS/DSCN MKR DOCD: CPT | Performed by: FAMILY MEDICINE

## 2024-10-14 PROCEDURE — G8484 FLU IMMUNIZE NO ADMIN: HCPCS | Performed by: FAMILY MEDICINE

## 2024-10-14 PROCEDURE — G8417 CALC BMI ABV UP PARAM F/U: HCPCS | Performed by: FAMILY MEDICINE

## 2024-10-14 PROCEDURE — 99214 OFFICE O/P EST MOD 30 MIN: CPT | Performed by: FAMILY MEDICINE

## 2024-10-14 PROCEDURE — G8427 DOCREV CUR MEDS BY ELIG CLIN: HCPCS | Performed by: FAMILY MEDICINE

## 2024-10-14 PROCEDURE — 3017F COLORECTAL CA SCREEN DOC REV: CPT | Performed by: FAMILY MEDICINE

## 2024-10-14 RX ORDER — NYSTATIN 100000 U/G
CREAM TOPICAL
COMMUNITY
Start: 2024-08-19

## 2024-10-14 NOTE — TELEPHONE ENCOUNTER
OK.  I would recommend being placed on a cancellation list for Dr. Cordero.  Unsure if Dr. Gifford will remove a penile wart?

## 2024-10-14 NOTE — PROGRESS NOTES
Diagnosis Orders   1. Lesion of penis  Amb External Referral To Dermatology        Return for with the referal specialist.  Patient Instructions   Referral to dermatology.  Somewhat suspicious for squamous cell cancer as well as verrucous lesion.    Subjective:      Patient ID: Marcell Qureshi is a 74 y.o. male who presents for:  Chief Complaint   Patient presents with    Skin Exam     Pt has a penile lesion. He treated it with aldara but it made it bleed. Also history of warts.        HPI  Patient tried to scratch off area when it was smaller and it bled.  Was given topical treatment with Aldara and it bled.  Would like to have lesion removed.  Is uncircumcised.        Current Outpatient Medications on File Prior to Visit   Medication Sig Dispense Refill    nystatin (MYCOSTATIN) 346400 UNIT/GM cream FOR PENILE YEAST INFECTION APPLY TOPICALLY TWICE DAILY      sildenafil (VIAGRA) 100 MG tablet TAKE 1 TABLET BY MOUTH 60 MINUTES BEFORE SEXUAL INTERCOURSE IF NEEDED 18 tablet 2    aspirin 81 MG EC tablet Take 1 tablet by mouth daily 90 tablet 4    vitamin B-12 (CYANOCOBALAMIN) 1000 MCG tablet Take 1 tablet by mouth daily 90 tablet 4    amLODIPine (NORVASC) 10 MG tablet Take 1 tablet by mouth daily 90 tablet 4    lisinopril (PRINIVIL;ZESTRIL) 20 MG tablet Take 1 tablet by mouth daily 90 tablet 4    simvastatin (ZOCOR) 20 MG tablet Take 1 tablet by mouth nightly at bedtime. 90 tablet 4    vitamin D (RA VITAMIN D-3) 125 MCG (5000 UT) CAPS capsule Take 1 capsule by mouth daily 90 capsule 4    hydroCHLOROthiazide 12.5 MG tablet Take 1 tablet by mouth daily 90 tablet 4    prednisoLONE acetate (PRED FORTE) 1 % ophthalmic suspension 1 drop      Handicap Placard MISC by Does not apply route Good for 5 years; expires 5/28/2027. 1 each 0    latanoprost (XALATAN) 0.005 % ophthalmic solution Use 1 Drop in both eyes daily at bedtime.      dorzolamide-timolol (COSOPT) 22.3-6.8 MG/ML ophthalmic solution Use 1 Drop in both eyes twice

## 2024-10-16 NOTE — PATIENT INSTRUCTIONS
Referral to dermatology.  Somewhat suspicious for squamous cell cancer as well as verrucous lesion.

## 2024-12-18 ENCOUNTER — APPOINTMENT (OUTPATIENT)
Dept: UROLOGY | Facility: CLINIC | Age: 74
End: 2024-12-18
Payer: MEDICARE

## 2024-12-18 VITALS
HEIGHT: 66 IN | TEMPERATURE: 97.1 F | SYSTOLIC BLOOD PRESSURE: 165 MMHG | HEART RATE: 50 BPM | DIASTOLIC BLOOD PRESSURE: 95 MMHG | BODY MASS INDEX: 38.67 KG/M2 | WEIGHT: 240.6 LBS

## 2024-12-18 DIAGNOSIS — N48.9 PENILE LESION: Primary | ICD-10-CM

## 2024-12-18 PROCEDURE — 1159F MED LIST DOCD IN RCRD: CPT | Performed by: UROLOGY

## 2024-12-18 PROCEDURE — 99204 OFFICE O/P NEW MOD 45 MIN: CPT | Performed by: UROLOGY

## 2024-12-18 RX ORDER — VIT C/E/ZN/COPPR/LUTEIN/ZEAXAN 250MG-90MG
1 CAPSULE ORAL DAILY
COMMUNITY
Start: 2024-02-26

## 2024-12-18 RX ORDER — CEFAZOLIN SODIUM 2 G/100ML
2 INJECTION, SOLUTION INTRAVENOUS ONCE
OUTPATIENT
Start: 2024-12-18 | End: 2024-12-18

## 2024-12-18 RX ORDER — SIMVASTATIN 20 MG/1
20 TABLET, FILM COATED ORAL NIGHTLY
COMMUNITY

## 2024-12-18 RX ORDER — LISINOPRIL 20 MG/1
20 TABLET ORAL DAILY
COMMUNITY

## 2024-12-18 RX ORDER — ASPIRIN 81 MG/1
1 TABLET ORAL DAILY
COMMUNITY
Start: 2024-02-26

## 2024-12-18 RX ORDER — LANOLIN ALCOHOL/MO/W.PET/CERES
1 CREAM (GRAM) TOPICAL DAILY
COMMUNITY
Start: 2024-02-26 | End: 2025-02-25

## 2024-12-18 RX ORDER — SILDENAFIL 100 MG/1
100 TABLET, FILM COATED ORAL AS NEEDED
COMMUNITY

## 2024-12-18 RX ORDER — IBUPROFEN 100 MG/5ML
1000 SUSPENSION, ORAL (FINAL DOSE FORM) ORAL
COMMUNITY

## 2024-12-18 RX ORDER — AMLODIPINE BESYLATE 10 MG/1
10 TABLET ORAL DAILY
COMMUNITY

## 2024-12-18 RX ORDER — HYDROCHLOROTHIAZIDE 12.5 MG/1
12.5 TABLET ORAL DAILY
COMMUNITY

## 2024-12-18 ASSESSMENT — ENCOUNTER SYMPTOMS
OCCASIONAL FEELINGS OF UNSTEADINESS: 0
LOSS OF SENSATION IN FEET: 0
DEPRESSION: 0

## 2024-12-18 ASSESSMENT — PATIENT HEALTH QUESTIONNAIRE - PHQ9
SUM OF ALL RESPONSES TO PHQ9 QUESTIONS 1 AND 2: 0
1. LITTLE INTEREST OR PLEASURE IN DOING THINGS: NOT AT ALL
2. FEELING DOWN, DEPRESSED OR HOPELESS: NOT AT ALL

## 2024-12-18 NOTE — PROGRESS NOTES
Subjective   Patient ID: Wisam Austin is a 74 y.o. male new patient kindly referred by Dr. Skyler Moreira who presents for Penile lesions.    HPI  The patient states he has been to several providers in an effort to resolve the penile lesion. He was give a cream that did not help. He was told it is too close to the veins. It has been 5 months or more.     The patient is taking daily aspirin.    Component  Ref Range & Units 10 mo ago   PSA  0.00 - 4.00 ng/mL 6.14 High      Component  Ref Range & Units 1 yr ago   PSA  0.00 - 4.00 ng/mL 5.29 High      Component  Ref Range & Units 2 yr ago   PSA  0.00 - 4.00 ng/mL 3.70     Past Medical History  No past medical history on file.     Surgical History  No past surgical history on file.      Social History  He has no history on file for tobacco use, alcohol use, and drug use.    Family History  No family history on file.    Medications    Current Outpatient Medications:     amLODIPine (Norvasc) 10 mg tablet, Take 1 tablet (10 mg) by mouth once daily., Disp: , Rfl:     ascorbic acid (Vitamin C) 1,000 mg tablet, Take 1 tablet (1,000 mg) by mouth once daily., Disp: , Rfl:     aspirin 81 mg EC tablet, Take 1 tablet (81 mg) by mouth once daily., Disp: , Rfl:     cholecalciferol (Vitamin D-3) 125 mcg (5000 UT) capsule, Take 1 capsule (125 mcg) by mouth once daily., Disp: , Rfl:     cyanocobalamin (Vitamin B-12) 1,000 mcg tablet, Take 1 tablet (1,000 mcg) by mouth once daily., Disp: , Rfl:     hydroCHLOROthiazide (Microzide) 12.5 mg tablet, Take 1 tablet (12.5 mg) by mouth once daily., Disp: , Rfl:     lisinopril 20 mg tablet, Take 1 tablet (20 mg) by mouth once daily., Disp: , Rfl:     sildenafil (Viagra) 100 mg tablet, Take 1 tablet (100 mg) by mouth if needed for erectile dysfunction., Disp: , Rfl:     simvastatin (Zocor) 20 mg tablet, Take 1 tablet (20 mg) by mouth once daily at bedtime., Disp: , Rfl:      Allergies  Apraclonidine and Brimonidine     Review of Systems  A 12  system review was completed and is negative with the exception of those signs and symptoms noted in the history of present illness.    Objective   Physical Exam  General: in NAD, appears stated age  Head: normocephalic, atraumatic  Neck: supple; trachea is midline  Respiratory: normal effort, no use of accessory muscles  Cardiovascular: no peripheral edema  Abdomen: soft, nondistended, nontender, no rebound or guarding, no organomegaly, no CVA tenderness, no hernia  Lymphatic: no lymphadenopathy noted  Skin: normal turgor, no rashes  Neurologic: grossly intact, oriented to person/place/time  Psychiatric: mode and affect appropriate  : normal phallus, normal meatus, scrotum normal, testicles normal bilaterally, epididymis normal bilaterally  On the glans penis, right of the meatus, there is a 2 cm verrucous mass. No palpable inguinal lymphadenopathy.    Assessment/Plan   Problem List Items Addressed This Visit    None  Visit Diagnoses         Codes    Penile lesion    -  Primary N48.9    Relevant Orders    Case Request Operating Room: Circumcision (Completed)    Basic Metabolic Panel    CBC    ECG 12 lead    Request for Pre-Admission Testing Visit          The lesion is concerning for malignancy. I explained that the lesion needs to be removed. A circumcision will be done at the same time. We discussed the risks, benefits, and alternatives to the procedure. We also discussed the postop care and recovery expectations.  All questions and concerns were addressed. Patient verbalizes understanding and has no other questions at this time. Schedule circumcision and excision of penile lesion. Follow-up with pathology.     While searching the patient's history, we noted PSA is on an upward trend, with the last value at 6.14. We cannot see that this has been investigated further. Discuss with the patient after penile lesion is treated.     Follow-up excision of penile lesion and circumcision for continued management of  penile lesion.     Scribe Attestation  By signing my name below, I, Miguelangel Landry   attest that this documentation has been prepared under the direction and in the presence of Andre Storey MD.

## 2024-12-24 ENCOUNTER — TELEPHONE (OUTPATIENT)
Dept: UROLOGY | Facility: CLINIC | Age: 74
End: 2024-12-24
Payer: MEDICARE

## 2024-12-24 NOTE — TELEPHONE ENCOUNTER
Pt called requesting orders for pre-op blood work. Advised orders are in the system and no hard copy is needed. Reviewed instructions in surgery letter with understanding voiced. No further questions at this time.

## 2024-12-26 RX ORDER — IBUPROFEN 200 MG
1 CAPSULE ORAL DAILY
COMMUNITY

## 2024-12-26 RX ORDER — LATANOPROST 50 UG/ML
1 SOLUTION/ DROPS OPHTHALMIC NIGHTLY
COMMUNITY

## 2024-12-26 RX ORDER — DORZOLAMIDE HCL 20 MG/ML
1 SOLUTION/ DROPS OPHTHALMIC 2 TIMES DAILY
COMMUNITY

## 2024-12-26 RX ORDER — BISMUTH SUBSALICYLATE 262 MG
1 TABLET,CHEWABLE ORAL DAILY
COMMUNITY

## 2024-12-26 NOTE — PREPROCEDURE INSTRUCTIONS

## 2024-12-27 ENCOUNTER — HOSPITAL ENCOUNTER (OUTPATIENT)
Dept: CARDIOLOGY | Facility: HOSPITAL | Age: 74
Discharge: HOME | End: 2024-12-27
Payer: MEDICARE

## 2024-12-27 ENCOUNTER — LAB (OUTPATIENT)
Dept: LAB | Facility: HOSPITAL | Age: 74
End: 2024-12-27
Payer: MEDICARE

## 2024-12-27 DIAGNOSIS — N48.9 PENILE LESION: ICD-10-CM

## 2024-12-27 LAB
ANION GAP SERPL CALC-SCNC: 10 MMOL/L (ref 10–20)
ATRIAL RATE: 60 BPM
BUN SERPL-MCNC: 16 MG/DL (ref 6–23)
CALCIUM SERPL-MCNC: 10.6 MG/DL (ref 8.6–10.3)
CHLORIDE SERPL-SCNC: 104 MMOL/L (ref 98–107)
CO2 SERPL-SCNC: 31 MMOL/L (ref 21–32)
CREAT SERPL-MCNC: 1 MG/DL (ref 0.5–1.3)
EGFRCR SERPLBLD CKD-EPI 2021: 79 ML/MIN/1.73M*2
ERYTHROCYTE [DISTWIDTH] IN BLOOD BY AUTOMATED COUNT: 11.9 % (ref 11.5–14.5)
GLUCOSE SERPL-MCNC: 114 MG/DL (ref 74–99)
HCT VFR BLD AUTO: 44.2 % (ref 41–52)
HGB BLD-MCNC: 14.5 G/DL (ref 13.5–17.5)
MCH RBC QN AUTO: 29.5 PG (ref 26–34)
MCHC RBC AUTO-ENTMCNC: 32.8 G/DL (ref 32–36)
MCV RBC AUTO: 90 FL (ref 80–100)
NRBC BLD-RTO: 0 /100 WBCS (ref 0–0)
P AXIS: 105 DEGREES
P OFFSET: 185 MS
P ONSET: 133 MS
PLATELET # BLD AUTO: 236 X10*3/UL (ref 150–450)
POTASSIUM SERPL-SCNC: 4.1 MMOL/L (ref 3.5–5.3)
PR INTERVAL: 174 MS
Q ONSET: 220 MS
QRS COUNT: 10 BEATS
QRS DURATION: 90 MS
QT INTERVAL: 424 MS
QTC CALCULATION(BAZETT): 424 MS
QTC FREDERICIA: 424 MS
R AXIS: 33 DEGREES
RBC # BLD AUTO: 4.92 X10*6/UL (ref 4.5–5.9)
SODIUM SERPL-SCNC: 141 MMOL/L (ref 136–145)
T AXIS: -23 DEGREES
T OFFSET: 432 MS
VENTRICULAR RATE: 60 BPM
WBC # BLD AUTO: 6.7 X10*3/UL (ref 4.4–11.3)

## 2024-12-27 PROCEDURE — 80048 BASIC METABOLIC PNL TOTAL CA: CPT

## 2024-12-27 PROCEDURE — 36415 COLL VENOUS BLD VENIPUNCTURE: CPT

## 2024-12-27 PROCEDURE — 93005 ELECTROCARDIOGRAM TRACING: CPT

## 2024-12-27 PROCEDURE — 85027 COMPLETE CBC AUTOMATED: CPT

## 2024-12-30 DIAGNOSIS — N40.0 ENLARGED PROSTATE: Primary | ICD-10-CM

## 2024-12-30 DIAGNOSIS — N40.1 BPH WITH OBSTRUCTION/LOWER URINARY TRACT SYMPTOMS: ICD-10-CM

## 2024-12-30 DIAGNOSIS — N13.8 BPH WITH OBSTRUCTION/LOWER URINARY TRACT SYMPTOMS: ICD-10-CM

## 2024-12-30 LAB — PSA SERPL-MCNC: 6.2 NG/ML (ref 0–4)

## 2025-01-05 LAB
ATRIAL RATE: 60 BPM
P AXIS: 105 DEGREES
P OFFSET: 185 MS
P ONSET: 133 MS
PR INTERVAL: 174 MS
Q ONSET: 220 MS
QRS COUNT: 10 BEATS
QRS DURATION: 90 MS
QT INTERVAL: 424 MS
QTC CALCULATION(BAZETT): 424 MS
QTC FREDERICIA: 424 MS
R AXIS: 33 DEGREES
T AXIS: -23 DEGREES
T OFFSET: 432 MS
VENTRICULAR RATE: 60 BPM

## 2025-01-07 ENCOUNTER — ANESTHESIA EVENT (OUTPATIENT)
Dept: OPERATING ROOM | Facility: HOSPITAL | Age: 75
End: 2025-01-07
Payer: MEDICARE

## 2025-01-07 PROBLEM — I10 HTN (HYPERTENSION): Status: ACTIVE | Noted: 2025-01-07

## 2025-01-07 PROBLEM — E66.812 CLASS 2 OBESITY WITH BODY MASS INDEX (BMI) OF 38.0 TO 38.9 IN ADULT: Status: ACTIVE | Noted: 2025-01-07

## 2025-01-07 PROBLEM — E78.5 HYPERLIPIDEMIA: Status: ACTIVE | Noted: 2025-01-07

## 2025-01-07 SDOH — HEALTH STABILITY: MENTAL HEALTH: CURRENT SMOKER: 0

## 2025-01-08 ENCOUNTER — HOSPITAL ENCOUNTER (OUTPATIENT)
Facility: HOSPITAL | Age: 75
Setting detail: OUTPATIENT SURGERY
Discharge: HOME | End: 2025-01-08
Attending: UROLOGY | Admitting: UROLOGY
Payer: MEDICARE

## 2025-01-08 ENCOUNTER — ANESTHESIA (OUTPATIENT)
Dept: OPERATING ROOM | Facility: HOSPITAL | Age: 75
End: 2025-01-08
Payer: MEDICARE

## 2025-01-08 VITALS
OXYGEN SATURATION: 98 % | RESPIRATION RATE: 18 BRPM | HEART RATE: 60 BPM | HEIGHT: 66 IN | TEMPERATURE: 97.9 F | DIASTOLIC BLOOD PRESSURE: 74 MMHG | BODY MASS INDEX: 38.12 KG/M2 | WEIGHT: 237.22 LBS | SYSTOLIC BLOOD PRESSURE: 167 MMHG

## 2025-01-08 DIAGNOSIS — N48.9 PENILE LESION: ICD-10-CM

## 2025-01-08 PROCEDURE — 7100000009 HC PHASE TWO TIME - INITIAL BASE CHARGE: Performed by: UROLOGY

## 2025-01-08 PROCEDURE — 54161 CIRCUM 28 DAYS OR OLDER: CPT | Performed by: UROLOGY

## 2025-01-08 PROCEDURE — 7100000010 HC PHASE TWO TIME - EACH INCREMENTAL 1 MINUTE: Performed by: UROLOGY

## 2025-01-08 PROCEDURE — 2500000004 HC RX 250 GENERAL PHARMACY W/ HCPCS (ALT 636 FOR OP/ED): Performed by: UROLOGY

## 2025-01-08 PROCEDURE — 2500000004 HC RX 250 GENERAL PHARMACY W/ HCPCS (ALT 636 FOR OP/ED): Performed by: STUDENT IN AN ORGANIZED HEALTH CARE EDUCATION/TRAINING PROGRAM

## 2025-01-08 PROCEDURE — 3600000002 HC OR TIME - INITIAL BASE CHARGE - PROCEDURE LEVEL TWO: Performed by: UROLOGY

## 2025-01-08 PROCEDURE — 3700000002 HC GENERAL ANESTHESIA TIME - EACH INCREMENTAL 1 MINUTE: Performed by: UROLOGY

## 2025-01-08 PROCEDURE — 54120 PARTIAL REMOVAL OF PENIS: CPT | Performed by: UROLOGY

## 2025-01-08 PROCEDURE — 7100000002 HC RECOVERY ROOM TIME - EACH INCREMENTAL 1 MINUTE: Performed by: UROLOGY

## 2025-01-08 PROCEDURE — 2500000004 HC RX 250 GENERAL PHARMACY W/ HCPCS (ALT 636 FOR OP/ED): Mod: JZ | Performed by: UROLOGY

## 2025-01-08 PROCEDURE — 3600000007 HC OR TIME - EACH INCREMENTAL 1 MINUTE - PROCEDURE LEVEL TWO: Performed by: UROLOGY

## 2025-01-08 PROCEDURE — 2500000001 HC RX 250 WO HCPCS SELF ADMINISTERED DRUGS (ALT 637 FOR MEDICARE OP): Performed by: STUDENT IN AN ORGANIZED HEALTH CARE EDUCATION/TRAINING PROGRAM

## 2025-01-08 PROCEDURE — 51702 INSERT TEMP BLADDER CATH: CPT

## 2025-01-08 PROCEDURE — 88304 TISSUE EXAM BY PATHOLOGIST: CPT | Mod: TC,ELYLAB | Performed by: UROLOGY

## 2025-01-08 PROCEDURE — 2500000005 HC RX 250 GENERAL PHARMACY W/O HCPCS: Performed by: UROLOGY

## 2025-01-08 PROCEDURE — 7100000001 HC RECOVERY ROOM TIME - INITIAL BASE CHARGE: Performed by: UROLOGY

## 2025-01-08 PROCEDURE — 3700000001 HC GENERAL ANESTHESIA TIME - INITIAL BASE CHARGE: Performed by: UROLOGY

## 2025-01-08 PROCEDURE — 2720000007 HC OR 272 NO HCPCS: Performed by: UROLOGY

## 2025-01-08 RX ORDER — SODIUM CHLORIDE, SODIUM LACTATE, POTASSIUM CHLORIDE, CALCIUM CHLORIDE 600; 310; 30; 20 MG/100ML; MG/100ML; MG/100ML; MG/100ML
100 INJECTION, SOLUTION INTRAVENOUS CONTINUOUS
Status: DISCONTINUED | OUTPATIENT
Start: 2025-01-08 | End: 2025-01-08 | Stop reason: HOSPADM

## 2025-01-08 RX ORDER — ALBUTEROL SULFATE 0.83 MG/ML
2.5 SOLUTION RESPIRATORY (INHALATION) ONCE AS NEEDED
Status: DISCONTINUED | OUTPATIENT
Start: 2025-01-08 | End: 2025-01-08 | Stop reason: HOSPADM

## 2025-01-08 RX ORDER — OXYCODONE HYDROCHLORIDE 5 MG/1
5 TABLET ORAL EVERY 4 HOURS PRN
Status: CANCELLED | OUTPATIENT
Start: 2025-01-08

## 2025-01-08 RX ORDER — KETOROLAC TROMETHAMINE 30 MG/ML
INJECTION, SOLUTION INTRAMUSCULAR; INTRAVENOUS AS NEEDED
Status: DISCONTINUED | OUTPATIENT
Start: 2025-01-08 | End: 2025-01-08

## 2025-01-08 RX ORDER — OXYCODONE HYDROCHLORIDE 5 MG/1
5 TABLET ORAL EVERY 4 HOURS PRN
Status: DISCONTINUED | OUTPATIENT
Start: 2025-01-08 | End: 2025-01-08 | Stop reason: HOSPADM

## 2025-01-08 RX ORDER — OXYCODONE HYDROCHLORIDE 5 MG/1
5 TABLET ORAL EVERY 6 HOURS PRN
Qty: 12 TABLET | Refills: 0 | Status: SHIPPED | OUTPATIENT
Start: 2025-01-08 | End: 2025-01-15

## 2025-01-08 RX ORDER — PROPOFOL 10 MG/ML
INJECTION, EMULSION INTRAVENOUS AS NEEDED
Status: DISCONTINUED | OUTPATIENT
Start: 2025-01-08 | End: 2025-01-08

## 2025-01-08 RX ORDER — ONDANSETRON HYDROCHLORIDE 2 MG/ML
4 INJECTION, SOLUTION INTRAVENOUS ONCE AS NEEDED
Status: DISCONTINUED | OUTPATIENT
Start: 2025-01-08 | End: 2025-01-08 | Stop reason: HOSPADM

## 2025-01-08 RX ORDER — SODIUM CHLORIDE 0.9 G/100ML
IRRIGANT IRRIGATION AS NEEDED
Status: DISCONTINUED | OUTPATIENT
Start: 2025-01-08 | End: 2025-01-08 | Stop reason: HOSPADM

## 2025-01-08 RX ORDER — AMLODIPINE BESYLATE 5 MG/1
10 TABLET ORAL ONCE
Status: COMPLETED | OUTPATIENT
Start: 2025-01-08 | End: 2025-01-08

## 2025-01-08 RX ORDER — MEPERIDINE HYDROCHLORIDE 25 MG/ML
12.5 INJECTION INTRAMUSCULAR; INTRAVENOUS; SUBCUTANEOUS EVERY 10 MIN PRN
Status: DISCONTINUED | OUTPATIENT
Start: 2025-01-08 | End: 2025-01-08 | Stop reason: HOSPADM

## 2025-01-08 RX ORDER — MIDAZOLAM HYDROCHLORIDE 1 MG/ML
INJECTION, SOLUTION INTRAMUSCULAR; INTRAVENOUS AS NEEDED
Status: DISCONTINUED | OUTPATIENT
Start: 2025-01-08 | End: 2025-01-08

## 2025-01-08 RX ORDER — BUPIVACAINE HYDROCHLORIDE 5 MG/ML
INJECTION, SOLUTION PERINEURAL AS NEEDED
Status: DISCONTINUED | OUTPATIENT
Start: 2025-01-08 | End: 2025-01-08 | Stop reason: HOSPADM

## 2025-01-08 RX ORDER — ONDANSETRON HYDROCHLORIDE 2 MG/ML
INJECTION, SOLUTION INTRAVENOUS AS NEEDED
Status: DISCONTINUED | OUTPATIENT
Start: 2025-01-08 | End: 2025-01-08

## 2025-01-08 RX ORDER — FENTANYL CITRATE 50 UG/ML
25 INJECTION, SOLUTION INTRAMUSCULAR; INTRAVENOUS EVERY 5 MIN PRN
Status: DISCONTINUED | OUTPATIENT
Start: 2025-01-08 | End: 2025-01-08 | Stop reason: HOSPADM

## 2025-01-08 RX ORDER — CEFAZOLIN SODIUM 2 G/100ML
2 INJECTION, SOLUTION INTRAVENOUS ONCE
Status: COMPLETED | OUTPATIENT
Start: 2025-01-08 | End: 2025-01-08

## 2025-01-08 RX ORDER — LIDOCAINE HYDROCHLORIDE 10 MG/ML
INJECTION, SOLUTION INFILTRATION; PERINEURAL AS NEEDED
Status: DISCONTINUED | OUTPATIENT
Start: 2025-01-08 | End: 2025-01-08 | Stop reason: HOSPADM

## 2025-01-08 RX ORDER — FENTANYL CITRATE 50 UG/ML
INJECTION, SOLUTION INTRAMUSCULAR; INTRAVENOUS AS NEEDED
Status: DISCONTINUED | OUTPATIENT
Start: 2025-01-08 | End: 2025-01-08

## 2025-01-08 RX ORDER — LIDOCAINE HYDROCHLORIDE 20 MG/ML
INJECTION, SOLUTION INFILTRATION; PERINEURAL AS NEEDED
Status: DISCONTINUED | OUTPATIENT
Start: 2025-01-08 | End: 2025-01-08

## 2025-01-08 RX ORDER — BACITRACIN ZINC 500 UNIT/G
OINTMENT (GRAM) TOPICAL 2 TIMES DAILY
Qty: 30 G | Refills: 1 | Status: SHIPPED | OUTPATIENT
Start: 2025-01-08

## 2025-01-08 RX ORDER — DIPHENHYDRAMINE HYDROCHLORIDE 50 MG/ML
12.5 INJECTION INTRAMUSCULAR; INTRAVENOUS ONCE AS NEEDED
Status: DISCONTINUED | OUTPATIENT
Start: 2025-01-08 | End: 2025-01-08 | Stop reason: HOSPADM

## 2025-01-08 RX ORDER — HYDRALAZINE HYDROCHLORIDE 20 MG/ML
5 INJECTION INTRAMUSCULAR; INTRAVENOUS EVERY 30 MIN PRN
Status: DISCONTINUED | OUTPATIENT
Start: 2025-01-08 | End: 2025-01-08 | Stop reason: HOSPADM

## 2025-01-08 RX ORDER — LABETALOL HYDROCHLORIDE 5 MG/ML
5 INJECTION, SOLUTION INTRAVENOUS ONCE AS NEEDED
Status: DISCONTINUED | OUTPATIENT
Start: 2025-01-08 | End: 2025-01-08 | Stop reason: HOSPADM

## 2025-01-08 RX ORDER — OXYCODONE HYDROCHLORIDE 5 MG/1
10 TABLET ORAL EVERY 4 HOURS PRN
Status: CANCELLED | OUTPATIENT
Start: 2025-01-08

## 2025-01-08 RX ADMIN — PROPOFOL 200 MG: 10 INJECTION, EMULSION INTRAVENOUS at 10:50

## 2025-01-08 RX ADMIN — FENTANYL CITRATE 50 MCG: 50 INJECTION, SOLUTION INTRAMUSCULAR; INTRAVENOUS at 11:05

## 2025-01-08 RX ADMIN — FENTANYL CITRATE 50 MCG: 50 INJECTION, SOLUTION INTRAMUSCULAR; INTRAVENOUS at 11:27

## 2025-01-08 RX ADMIN — KETOROLAC TROMETHAMINE 15 MG: 30 INJECTION, SOLUTION INTRAMUSCULAR at 11:41

## 2025-01-08 RX ADMIN — AMLODIPINE BESYLATE 10 MG: 5 TABLET ORAL at 09:38

## 2025-01-08 RX ADMIN — LIDOCAINE HYDROCHLORIDE 80 MG: 20 INJECTION, SOLUTION INFILTRATION; PERINEURAL at 10:48

## 2025-01-08 RX ADMIN — DEXAMETHASONE SODIUM PHOSPHATE 4 MG: 4 INJECTION, SOLUTION INTRAMUSCULAR; INTRAVENOUS at 10:58

## 2025-01-08 RX ADMIN — FENTANYL CITRATE 50 MCG: 50 INJECTION, SOLUTION INTRAMUSCULAR; INTRAVENOUS at 11:42

## 2025-01-08 RX ADMIN — FENTANYL CITRATE 25 MCG: 50 INJECTION INTRAMUSCULAR; INTRAVENOUS at 12:31

## 2025-01-08 RX ADMIN — OXYCODONE 5 MG: 5 TABLET ORAL at 12:58

## 2025-01-08 RX ADMIN — MIDAZOLAM 2 MG: 1 INJECTION INTRAMUSCULAR; INTRAVENOUS at 10:40

## 2025-01-08 RX ADMIN — ONDANSETRON 4 MG: 2 INJECTION, SOLUTION INTRAMUSCULAR; INTRAVENOUS at 10:58

## 2025-01-08 RX ADMIN — HYDROMORPHONE HYDROCHLORIDE 0.5 MG: 1 INJECTION, SOLUTION INTRAMUSCULAR; INTRAVENOUS; SUBCUTANEOUS at 12:14

## 2025-01-08 RX ADMIN — FENTANYL CITRATE 25 MCG: 50 INJECTION INTRAMUSCULAR; INTRAVENOUS at 12:25

## 2025-01-08 RX ADMIN — FENTANYL CITRATE 50 MCG: 50 INJECTION, SOLUTION INTRAMUSCULAR; INTRAVENOUS at 10:47

## 2025-01-08 RX ADMIN — SODIUM CHLORIDE, POTASSIUM CHLORIDE, SODIUM LACTATE AND CALCIUM CHLORIDE: 600; 310; 30; 20 INJECTION, SOLUTION INTRAVENOUS at 10:40

## 2025-01-08 RX ADMIN — CEFAZOLIN SODIUM 2 G: 2 INJECTION, SOLUTION INTRAVENOUS at 10:55

## 2025-01-08 RX ADMIN — HYDROMORPHONE HYDROCHLORIDE 0.5 MG: 1 INJECTION, SOLUTION INTRAMUSCULAR; INTRAVENOUS; SUBCUTANEOUS at 12:20

## 2025-01-08 ASSESSMENT — PAIN - FUNCTIONAL ASSESSMENT
PAIN_FUNCTIONAL_ASSESSMENT: 0-10

## 2025-01-08 ASSESSMENT — PAIN SCALES - GENERAL
PAINLEVEL_OUTOF10: 7
PAINLEVEL_OUTOF10: 3
PAINLEVEL_OUTOF10: 0 - NO PAIN
PAINLEVEL_OUTOF10: 4
PAINLEVEL_OUTOF10: 0 - NO PAIN
PAINLEVEL_OUTOF10: 4
PAINLEVEL_OUTOF10: 6
PAINLEVEL_OUTOF10: 7
PAIN_LEVEL: 2
PAINLEVEL_OUTOF10: 6
PAINLEVEL_OUTOF10: 7

## 2025-01-08 ASSESSMENT — PAIN DESCRIPTION - DESCRIPTORS
DESCRIPTORS: DULL;BURNING
DESCRIPTORS: SORE
DESCRIPTORS: BURNING
DESCRIPTORS: BURNING
DESCRIPTORS: SORE
DESCRIPTORS: BURNING

## 2025-01-08 ASSESSMENT — PAIN DESCRIPTION - LOCATION
LOCATION: PENIS
LOCATION: PENIS

## 2025-01-08 ASSESSMENT — COLUMBIA-SUICIDE SEVERITY RATING SCALE - C-SSRS
1. IN THE PAST MONTH, HAVE YOU WISHED YOU WERE DEAD OR WISHED YOU COULD GO TO SLEEP AND NOT WAKE UP?: NO
2. HAVE YOU ACTUALLY HAD ANY THOUGHTS OF KILLING YOURSELF?: NO
6. HAVE YOU EVER DONE ANYTHING, STARTED TO DO ANYTHING, OR PREPARED TO DO ANYTHING TO END YOUR LIFE?: NO

## 2025-01-08 NOTE — OP NOTE
Date: 2025  OR Location: [facility]    Name: Wisam Austin : 1950, Age: 74 y.o., MRN: [id number], Sex: male    Diagnosis  Pre-op Diagnosis      * Penile lesion [N48.9] Post-op Diagnosis     * Penile lesion [N48.9]     Procedures  1.  Circumcision  2.  Partial penectomy      Surgeons      * Andre Storey - Primary    Resident/Fellow/Other Assistant:  Surgeons and Role:     * Lyn Ji PA-C - Assisting    Procedure Summary  Anesthesia: General  ASA: II  Anesthesia Staff:   Anesthesiologist: Edy Vivar DO  Estimated Blood Loss: 25 cc  Intra-op Medications:   Medication Name Total Dose   sodium chloride 0.9 % irrigation solution 3,000 mL   ceFAZolin in dextrose (iso-os) (Ancef) IVPB 2 g 2 g              Anesthesia Record               Intraprocedure I/O Totals       None           Specimen:   Order Name Source Comment Collection Info Order Time   SURGICAL PATHOLOGY EXAM SOFT TISSUE RESECTION Pre-op diagnosis:  Penile lesion [N48.9] Collected By: Andre Storey MD 2025 11:19 AM       Staff:   Circulator: Erum Root RN  Relief Circulator: Radha Sandoval RN  Scrub Person: Faustina Blake         Drains and/or Catheters: * None in log *    Tourniquet Times:         Implants:     Findings: 2 cm verrucous lesion involving right glans penis completely excised.  Circumcision performed.    Indications: Wisam Austin is an 74 y.o. male who is having surgery for Penile lesion [N48.9].  Patient presented with a large genital lesion.  He has a 2 cm verrucous lesion on the right glans penis.  Presents today for excision.  I also  recommended circumcision.  The risk, benefits, and alternatives were discussed with patient.  Informed consent was obtained.    Procedure Details: Patient was brought to the operating room and placed in the supine position.  General anesthesia was induced.  Once anesthetized, his lower abdomen and genitalia were shaved, prepped, and draped in the usual sterile fashion  At the  meatus, a 4-0 chromic stitch was placed for retraction.  A circumferential incision was made in the foreskin overlying the corona.  The foreskin was then retracted and the second circumferential incision was made 1 cm proximal to the corona.  The foreskin was then removed with Bovie electrocautery.  Skin edges were then reapproximated at the 12, 3, 6, and 9 o'clock position with 3-0 Vicryl.  Remaining skin edges were closed with running 4-0 Vicryl.  At this point a Kiran catheter was placed.  A tourniquet using 1/2 inch Penrose was placed at the base of the penis.  We then sharply excised the verrucous lesion.  This required  resection into  the glans penis/corpus spongiosum.  Bovie electrocautery was then used to obtain hemostasis.  The base was sent as a second biopsy specimen.  Skin edges were then approximated with interrupted 4-0 chromic suture.  Bacitracin gauze was placed over the incision.  A compression dressing was placed.  We then used 10 mL of half percent Marcaine to perform a penile block.  The previously placed retraction stitch at the meatus was removed.  Patient was awakened and taken to the PACU in stable condition    Complications:  None; patient tolerated the procedure well.    Disposition: PACU - hemodynamically stable.  Condition: stable     Andre Storey  Phone Number: [phone]

## 2025-01-08 NOTE — ANESTHESIA PREPROCEDURE EVALUATION
Wisam Austin is a 74 y.o. male here for:    CIRCUMCISION  With Andre Storey MD  Pre-Op Diagnosis Codes:      * Penile lesion [N48.9]    Relevant Problems   Cardiac   (+) HTN (hypertension)   (+) Hyperlipidemia      Endocrine   (+) Class 2 obesity with body mass index (BMI) of 38.0 to 38.9 in adult       Lab Results   Component Value Date    HGB 14.5 12/27/2024    HCT 44.2 12/27/2024    WBC 6.7 12/27/2024     12/27/2024     12/27/2024    K 4.1 12/27/2024     12/27/2024    CREATININE 1.00 12/27/2024    BUN 16 12/27/2024       Social History     Tobacco Use   Smoking Status Former    Types: Cigarettes   Smokeless Tobacco Never       Allergies   Allergen Reactions    Apraclonidine Other     Other reaction(s): Other: See Comments -  Irritation, not tolerable    Brimonidine Other     Other reaction(s): Other: See Comments -  Follicles       Current Outpatient Medications   Medication Instructions    amLODIPine (NORVASC) 10 mg, Daily    aspirin 81 mg EC tablet 1 tablet, Daily    calcium citrate (Calcitrate) 200 mg (950 mg) tablet 1 tablet, oral, Daily    dorzolamide (Trusopt) 2 % ophthalmic solution 1 drop, Both Eyes, 2 times daily    hydroCHLOROthiazide (MICROZIDE) 12.5 mg, Daily    latanoprost (Xalatan) 0.005 % ophthalmic solution 1 drop, Both Eyes, Nightly    lisinopril 20 mg, Daily    multivitamin tablet 1 tablet, oral, Daily    sildenafil (VIAGRA) 100 mg, As needed    simvastatin (ZOCOR) 20 mg, Nightly       Past Surgical History:   Procedure Laterality Date    CATARACT EXTRACTION      COLONOSCOPY         No family history on file.    NPO Details:  No data recorded    Physical Exam    Airway  Mallampati: II  TM distance: >3 FB  Neck ROM: full     Cardiovascular - normal exam     Dental - normal exam     Pulmonary - normal exam     Abdominal            Anesthesia Plan    History of general anesthesia?: yes  History of complications of general anesthesia?: no    ASA 2     general     The patient  is not a current smoker.    intravenous induction   Postoperative administration of opioids is intended.  Trial extubation is planned.  Anesthetic plan and risks discussed with patient.

## 2025-01-08 NOTE — ANESTHESIA PROCEDURE NOTES
Airway  Date/Time: 1/8/2025 10:51 AM  Urgency: elective    Airway not difficult    Staffing  Performed: attending   Authorized by: Edy Vivar DO    Performed by: Edy Vivar DO  Patient location during procedure: OR    Indications and Patient Condition  Indications for airway management: anesthesia  Spontaneous Ventilation: absent  Sedation level: deep  Preoxygenated: yes  Patient position: sniffing  Mask difficulty assessment: 0 - not attempted    Final Airway Details  Final airway type: supraglottic airway      Successful airway: classic  Size 5     Number of attempts at approach: 1  Number of other approaches attempted: 0

## 2025-01-08 NOTE — DISCHARGE INSTRUCTIONS
Physician phone number provided to patient    General Anesthesia Discharge Instructions    About this topic  You may need general anesthesia if you need to be asleep during a procedure. Your doctor will use drugs to block the signals that go from your nerves to your brain. Doctors give general anesthesia during a surgery or procedure to:  Allow you to sleep  Help your body be still  Relax your muscles  Help you to relax and be pain free  Keep you from remembering the surgery  Let the doctor manage your airway, breathing, and blood flow  The doctor or nurse anesthetist gives general anesthesia by a shot into your vein. Sometimes, you may breathe in a gas through a mask placed over your face.  What care is needed at home?  Ask your doctor what you need to do when you go home. Make sure you ask questions if you do not understand what the doctor says.  Your doctor may give you drugs to prevent or treat an upset stomach from the anesthetic. Take them as ordered.  If your throat is sore, suck on ice chips or popsicles to ease throat pain.  Put 2 to 3 pillows under your head and back when you lie down to help you breathe easier.  For the first 24 to 48 hours:  Do not operate heavy or dangerous machinery.  Do not make major decisions or sign important papers. You may not be able to think clearly.  Avoid beer, wine, or mixed drinks.  You are at a higher risk of falling for at least 24 hours after general anesthesia.  Take extra care when you get up.  Do not change positions quickly.  Do not rush when you need to go to the bathroom or to answer the phone.  Ask for help if you feel unsteady when you try to walk.  Wear shoes with non-slip soles and low heels.  What follow-up care is needed?  Your doctor may ask you to come back to the office to check on your progress. Be sure to keep these visits.  If you have stitches that do not dissolve or staples, you will need to have them removed. Your doctor will want to do this in 1  to 2 weeks. If the doctor used skin glue, the glue will fall off on its own.  What drugs may be needed?  The doctor may order drugs to:  Help with pain  Treat an upset stomach or throwing up  Will physical activity be limited?  You will not be allowed to drive right away after the procedure. Ask a family member or a friend to drive you home.  Avoid trying to get out of bed without help until you are sure of your balance.  You may have to limit your activity. Talk to your doctor about if you need to limit how much you lift or limit exercise after your procedure.  What changes to diet are needed?  Start with a light diet when you are fully awake. This includes things that are easy to swallow like soups, pudding, jello, toast, and eggs. Slowly progress to your normal diet.  What problems could happen?  Low blood pressure  Breathing problems  Upset stomach or throwing up  Dizziness  Blood clots  Infection  When do I need to call the doctor?  Trouble breathing  Upset stomach or throwing up more than 3 times in the next 2 days  Dizziness  Teach Back: Helping You Understand  The Teach Back Method helps you understand the information we are giving you. After you talk with the staff, tell them in your own words what you learned. This helps to make sure the staff has described each thing clearly. It also helps to explain things that may have been confusing. Before going home, make sure you can do these:  I can tell you about my procedure.  I can tell you if I need to follow up with my doctor.  I can tell you what is good for me to eat and drink the next day.  I can tell you what I would do if I have trouble breathing, an upset stomach, or dizziness.  Where can I learn more?  [employer]  https://www.nigms.nih.gov/education/pages/factsheet_Anesthesia.aspx  N[facility] Choices  http://www.nhs.uk/conditions/Anaesthetic-general/Pages/Definition.aspx  Last Reviewed Date  2020-04-22  How to care for a  urinary catheter    The Basics  Written by the doctors and editors at [facility]  What is a urinary catheter? -- A urinary catheter is a thin, flexible tube that drains urine from the bladder. It connects to a special bag outside of the body (figure 1). The bag holds the urine until someone empties it.  In some cases, like if you are recovering from surgery, you might need to have a catheter for a short time. You might also need a catheter if your bladder isn't working properly or if you have a large prostate that prevents you from urinating normally.  Sometimes, a catheter is used for a long time. If so, you will need to get a new catheter every few weeks.  Having a catheter increases the risk of urinary tract infections. There are things that you can do when caring for the catheter to lower this risk.  How do I care for a urinary catheter at home? -- Ask the doctor or nurse what you should do when you go home. Make sure that you understand exactly what you need to do to care for yourself. Ask questions if there is anything you do not understand.  Your doctor might recommend working with a home health nurse. If so, they will come to your home to teach you how to care for the catheter.  To care for the catheter:  ? Wash your hands before and after handling the catheter.  ? Wash the skin around the catheter with soap and water each day. Rinse well, and pat the skin dry.  ? Do not get lotions or creams on the tube.  ? Keep the tube secure. You might want to use special straps or another device to keep the bag or catheter on your leg.  ? Do not let the tube pull or catch when you move around.  ? Do not let the tube kink or loop.  ? Do not clamp the catheter or tubing unless you were told to.  To care for the drainage bag:  ? Always keep the bag below your bladder.  ? Empty the bag often. To do this:  ? Wash your hands.  ? Slide the drain tube out of its lott.  ? Place the drain tube over the toilet or a clean  container. Do not let the drain tube touch the toilet or container.  ? Open the clamp or valve, and drain the urine. If you were told to, drain the urine into a container. Measure the amount, and write it down.  ? When the bag is empty, close the clamp or valve and place it back in the lott.  ? Wash your hands.  ? To change bags:  ? Wash your hands.  ? Empty the bag.  ? Put a towel under the place where the catheter and the tubing connect.  ? Wipe the connection with an alcohol wipe, and let it air dry.  ? Pinch the soft rubber catheter so urine does not leak out.  ? Disconnect the tubing from the catheter with a twisting motion.  ? Use another alcohol wipe, and clean the end of the catheter. Move away from the opening as you clean.  ? Take the cap off of the new tubing and bag, and attach the catheter to the tubing.  ? Stop pinching the catheter, and let the urine drain into the new tubing and bag.  ? Make sure that the catheter and bag are secure, but do not pull.  ? Rinse the used, empty drainage bag with 1 cup (240 mL) vinegar mixed with 1 cup (240 mL) water. Shake the mixture around, and let it sit for about 15 minutes. Rinse the bag thoroughly with cool water, and let it dry.  ? Place a cap on the drainage bag that you are not using, and store it in a clean towel.  ? Wash your hands.  You should also:  ? Wear cotton underwear.  ? Check regularly to make sure that there is urine in the bag. If you do not see urine:  ? Change your position to help urine flow out.  ? Check the tubing and catheter for kinks or loops. Be sure that the tube is not clamped.  ? Make sure that the bag is lower than your bladder.  ? Drink plenty of fluids.  ? Avoid soaking in a bath, hot tub, pool, or other body of water while the catheter is in. Showers are OK.  ? Talk to your doctor about what you can and cannot do while the catheter is in place. In general, you can do many of your normal activities as long as you keep the drainage  bag below the level of your bladder.  When should I call the doctor? -- Call for advice if:  ? You have any signs of infection - These include a fever of 100.4°F (38°C) or higher, chills, pain around the catheter, or redness or swelling of the skin around the catheter.  ? Your urine has blood in it, is dark or coffee colored, has solid bits in it, or looks like pus.  ? The tube comes out, or urine stops flowing into the bag.  ? You feel burning or pain in your bladder.  ? You have any new or worrying symptoms.  All topics are updated as new evidence becomes available and our peer review process is complete.  This topic retrieved from [employer] on: Luis Enrique 15, 2024.  Topic 213085 Version 4.0  Release: 32.5.3 - C32.165  © 2024 Up[employer], [employer]. and/or its affiliates. All rights reserved.  figure 1: Urinary catheter        Post-Op Instructions for Circumcision    Procedure Overview  You have had a circumcision. The foreskin was removed to uncover the head of the penis. The skin edges at the site of incision were brought back together with dissolving stitches.    Pain Management:  Tylenol and Ibuprofen are typically sufficient.  Tylenol 1000 mg can be taken every 6 hours. Ibuprofen 600 mg (Motrin or Advil) can also be taken every 6 hours.  You can alternate these medications such that you are taking either Tylenol or ibuprofen every 3 hours.    Occasionally we will prescribe an opioid pain medication postoperatively, ONLY use this for pain not controlled by Tylenol and ibuprofen.    Aleve (naproxen) is an acceptable alternative to ibuprofen.    Wound Care/Surgical Site Care:  Typically rolled gauze will be wrapped around your penis to protect the wound after surgery. The gauze can be removed within 24 hours of surgery. This is done by removing the tape and unraveling the gauze. You do not need to replace this with another bandage or dressing on the penis. Apply petroleum jelly to the incision 3-4x per day for the first 48 hours  after surgery.    Bleeding: If bleeding occurs from the skin edges or in between the stitches, apply firm steady pressure over the area for 10-15 minutes or until the bleeding stops, then apply an ice pack (cold gel pack, or crushed ice in a plastic bag, or a small bag of frozen peas). Do not apply ice directly onto the skin. An ice pack should always be wrapped in a pillowcase or towel, before applying.     Bruising and Swelling: You will have some bruising, swelling and tenderness of the penis for a few days. You will see this especially along the penile shaft and it will often progress for 24-48 hours before starting to improve. Discoloration of the penile skin will occur during the healing process and may take a few weeks. You may feel more comfortable wearing snug-fitting underwear for support.     Activity  Do not lift anything over 15 lbs (about a gallon jug of milk) for 2 weeks. Heavy lifting could cause bleeding in your penis/scrotum.    Avoid sexual activity for 3-4 weeks, this includes masturbation    Walking is encouraged. Bed rest is a thing of the past, the more you're up walking, the better. Walking helps prevent blood clots and pneumonia after surgery. You can take stairs, just go slowly at first.     Diet  You may return to eating regular food immediately. To keep your urine flowing freely and to avoid constipation, drink plenty of fluids during the day (8-10 glasses). Water is the best.    Bowel Movements  Constipation is common after surgery. We would like you to avoid this by using miralax at least once a day until you are having 1 soft bowel movement per day. If you are having issues with constipation, increase your dose of miralax to twice a day.     Other Medications  Resume your medications unless we tell you otherwise  Do not resume any blood thinners until your doctor specifies when it is okay.    Driving  You can resume driving after you have been off of opioid pain medications for 24  hours and feel you can press on the breaks suddenly (or other similar movements) if needed.     Problems you should report to your Doctor (contact info below)  Fever greater than 100.5 degrees F  Heavy bleeding or clots  Inability to urinate  Reactions to medication (hives, rash, nausea, vomiting, diarrhea)    Follow-up  We typically arrange your postoperative appointment for you.  If you do not hear from us to schedule this appointment within 1 week after surgery, please call our office.    DR. KUMAR'S CONTACT INFORMATION  For non-urgent issues, please send our office a message via [facility], this is often easier and more convenient for you than calling the office. You should have received an invitation to sign-up for [facility] in your email upon registration.    For appointments:  [phone], option 1 -> option 3 -> option 9    For questions/issues/concerns during business hours: [phone] - this number will direct you to the voicemail for Dr. Kumar's , which is frequently checked during business hours.     For after-hours issues:  [phone], this will direct you to our answering service or the resident physician on call if needed.    For medical emergencies, please call 911 or proceed to your nearest emergency room.

## 2025-01-08 NOTE — ANESTHESIA POSTPROCEDURE EVALUATION
Patient: Wisam Austin    Procedure Summary       Date: 01/08/25 Room / Location: [facility]  / [facility]    Anesthesia Start: 1040 Anesthesia Stop: 1205    Procedure: CIRCUMCISION PARTIAL PENECTOMY Diagnosis:       Penile lesion      (Penile lesion [N48.9])    Surgeons: Andre Storey MD Responsible Provider: Edy Vivar DO    Anesthesia Type: general ASA Status: 2            Anesthesia Type: general    Vitals Value Taken Time   /72 01/08/25 1216   Temp 36 °C (96.8 °F) 01/08/25 1201   Pulse 62 01/08/25 1217   Resp 11 01/08/25 1217   SpO2 97 % 01/08/25 1217   Vitals shown include unfiled device data.    Anesthesia Post Evaluation    Patient location during evaluation: PACU  Patient participation: complete - patient participated  Level of consciousness: awake  Pain score: 2  Pain management: adequate  Airway patency: patent  Cardiovascular status: acceptable, blood pressure returned to baseline and hemodynamically stable  Respiratory status: acceptable, nonlabored ventilation, spontaneous ventilation and face mask  Hydration status: acceptable  Postoperative Nausea and Vomiting: none        No notable events documented.

## 2025-01-13 ENCOUNTER — CLINICAL SUPPORT (OUTPATIENT)
Dept: UROLOGY | Facility: CLINIC | Age: 75
End: 2025-01-13
Payer: MEDICARE

## 2025-01-13 VITALS — SYSTOLIC BLOOD PRESSURE: 178 MMHG | DIASTOLIC BLOOD PRESSURE: 96 MMHG | HEART RATE: 62 BPM | TEMPERATURE: 97.8 F

## 2025-01-13 DIAGNOSIS — N48.9 PENILE LESION: ICD-10-CM

## 2025-01-13 PROCEDURE — 99024 POSTOP FOLLOW-UP VISIT: CPT | Performed by: NURSE PRACTITIONER

## 2025-01-13 NOTE — PROGRESS NOTES
Pt presented for trial of void and catheter removal. Pt tolerated trial of void well. Pt tolerated catheter removal well. Advised pt of normal/abnormal signs and symptoms of urinary retention and when to seek provider or ER care. Pt stated understanding.

## 2025-01-15 LAB
LABORATORY COMMENT REPORT: NORMAL
PATH REPORT.FINAL DX SPEC: NORMAL
PATH REPORT.GROSS SPEC: NORMAL
PATH REPORT.RELEVANT HX SPEC: NORMAL
PATH REPORT.TOTAL CANCER: NORMAL

## 2025-01-21 ENCOUNTER — TELEPHONE (OUTPATIENT)
Dept: UROLOGY | Facility: CLINIC | Age: 75
End: 2025-01-21
Payer: MEDICARE

## 2025-01-21 DIAGNOSIS — N48.9 PENILE LESION: ICD-10-CM

## 2025-01-21 RX ORDER — OXYCODONE AND ACETAMINOPHEN 5; 325 MG/1; MG/1
1 TABLET ORAL EVERY 6 HOURS PRN
Qty: 10 TABLET | Refills: 0 | Status: SHIPPED | OUTPATIENT
Start: 2025-01-21 | End: 2025-01-28

## 2025-01-28 ENCOUNTER — APPOINTMENT (OUTPATIENT)
Dept: UROLOGY | Facility: CLINIC | Age: 75
End: 2025-01-28
Payer: MEDICARE

## 2025-01-28 DIAGNOSIS — N48.9 PENILE LESION: ICD-10-CM

## 2025-01-28 DIAGNOSIS — N48.89 PENILE PAIN: ICD-10-CM

## 2025-01-28 PROCEDURE — 1159F MED LIST DOCD IN RCRD: CPT | Performed by: UROLOGY

## 2025-01-28 PROCEDURE — 99024 POSTOP FOLLOW-UP VISIT: CPT | Performed by: UROLOGY

## 2025-01-28 RX ORDER — PHENAZOPYRIDINE HYDROCHLORIDE 100 MG/1
100 TABLET, FILM COATED ORAL 3 TIMES DAILY
Qty: 10 TABLET | Refills: 0 | Status: SHIPPED | OUTPATIENT
Start: 2025-01-28 | End: 2025-02-07

## 2025-01-28 ASSESSMENT — PATIENT HEALTH QUESTIONNAIRE - PHQ9
SUM OF ALL RESPONSES TO PHQ9 QUESTIONS 1 AND 2: 0
2. FEELING DOWN, DEPRESSED OR HOPELESS: NOT AT ALL
1. LITTLE INTEREST OR PLEASURE IN DOING THINGS: NOT AT ALL

## 2025-01-28 ASSESSMENT — ENCOUNTER SYMPTOMS
OCCASIONAL FEELINGS OF UNSTEADINESS: 0
DEPRESSION: 0
LOSS OF SENSATION IN FEET: 0

## 2025-01-28 NOTE — PROGRESS NOTES
Subjective   Patient ID: Wisam Austin is a 74 y.o. male who presents for Results. Patient is s/p CIRCUMCISION PARTIAL PENECTOMY on 1/8/25.    HPI  The patient states he has pain at the tip of his penis when voiding.     Pathology Results  FINAL DIAGNOSIS   A. FORESKIN, EXCISION:  --SKIN WITH MILD CHRONIC INFLAMMATION     B. SKIN, PENILE, EXCISION:  --VIRAL WART, INFLAMED, PRESENT AT CAUTERIZED MARGIN  --NO HIGH-GRADE DYSPLASIA OR MALIGNANCY IDENTIFIED       C. SOFT TISSUE, BASE OF PENILE LESION, EXCISION:  --FIBROVASCULAR TISSUE WITH NO SIGNIFICANT PATHOLOGIC FINDINGS     Review of Systems  A 12 system review was completed and is negative with the exception of those signs and symptoms noted in the history of present illness.    Objective   Physical Exam  General: in NAD, appears stated age  Head: normocephalic, atraumatic  Neck: supple; trachea is midline  Respiratory: normal effort, no use of accessory muscles  Cardiovascular: no peripheral edema  Abdomen: soft, nondistended, nontender, no rebound or guarding, no organomegaly, no CVA tenderness, no hernia  Lymphatic: no lymphadenopathy noted  Skin: normal turgor, no rashes  Neurologic: grossly intact, oriented to person/place/time  Psychiatric: mode and affect appropriate  : normal phallus, normal meatus, scrotum normal, testicles normal bilaterally, epididymis normal bilaterally  Circumcision incision is well-healed, there is a small amount of exudate superficially on the glans penis    Assessment/Plan   Problem List Items Addressed This Visit             ICD-10-CM    Penile lesion N48.9    Relevant Medications    phenazopyridine (Pyridium) 100 mg tablet     Other Visit Diagnoses         Codes    Penile pain     N48.89    Relevant Medications    phenazopyridine (Pyridium) 100 mg tablet          The patient will dress the wound with dry guaze. I encouraged him to keep the area clean and dry. He does not need to apply ointment. We discussed that the biopsy was  negative for cancer. Order pyridium to help with dysuria. We will see the patient back in 1 month for wound check.     Follow-up in 1 month for continued management of wound care s/p penectomy.     Scribe Attestation  By signing my name below, IMagda Scribe   attest that this documentation has been prepared under the direction and in the presence of Andre Storey MD.

## 2025-02-07 ENCOUNTER — TELEPHONE (OUTPATIENT)
Dept: UROLOGY | Facility: CLINIC | Age: 75
End: 2025-02-07
Payer: MEDICARE

## 2025-02-07 NOTE — TELEPHONE ENCOUNTER
Spoke with pt about multivitamin. Pt stated that he was taking this before his surgery. Advised pt that since he was taking this before his surgery, he should be fine to take the multivitamin now. Pt stated understanding.

## 2025-02-27 ENCOUNTER — APPOINTMENT (OUTPATIENT)
Dept: RADIOLOGY | Facility: HOSPITAL | Age: 75
DRG: 871 | End: 2025-02-27
Payer: MEDICARE

## 2025-02-27 ENCOUNTER — APPOINTMENT (OUTPATIENT)
Dept: CARDIOLOGY | Facility: HOSPITAL | Age: 75
DRG: 871 | End: 2025-02-27
Payer: MEDICARE

## 2025-02-27 ENCOUNTER — HOSPITAL ENCOUNTER (INPATIENT)
Facility: HOSPITAL | Age: 75
DRG: 871 | End: 2025-02-27
Attending: STUDENT IN AN ORGANIZED HEALTH CARE EDUCATION/TRAINING PROGRAM | Admitting: INTERNAL MEDICINE
Payer: MEDICARE

## 2025-02-27 ENCOUNTER — APPOINTMENT (OUTPATIENT)
Dept: UROLOGY | Facility: CLINIC | Age: 75
End: 2025-02-27
Payer: MEDICARE

## 2025-02-27 VITALS
WEIGHT: 201 LBS | HEART RATE: 106 BPM | SYSTOLIC BLOOD PRESSURE: 88 MMHG | DIASTOLIC BLOOD PRESSURE: 61 MMHG | HEIGHT: 66 IN | BODY MASS INDEX: 32.3 KG/M2

## 2025-02-27 DIAGNOSIS — E78.5 HYPERLIPIDEMIA, UNSPECIFIED HYPERLIPIDEMIA TYPE: ICD-10-CM

## 2025-02-27 DIAGNOSIS — R79.89 ELEVATED TROPONIN: ICD-10-CM

## 2025-02-27 DIAGNOSIS — A41.9 SEPSIS DUE TO URINARY TRACT INFECTION (MULTI): ICD-10-CM

## 2025-02-27 DIAGNOSIS — N48.9 PENILE LESION: Primary | ICD-10-CM

## 2025-02-27 DIAGNOSIS — E83.52 HYPERCALCEMIA: ICD-10-CM

## 2025-02-27 DIAGNOSIS — I95.89 OTHER SPECIFIED HYPOTENSION: ICD-10-CM

## 2025-02-27 DIAGNOSIS — N39.0 SEPSIS DUE TO URINARY TRACT INFECTION (MULTI): ICD-10-CM

## 2025-02-27 DIAGNOSIS — N17.9 AKI (ACUTE KIDNEY INJURY) (CMS-HCC): Primary | ICD-10-CM

## 2025-02-27 DIAGNOSIS — R35.89 POSTOBSTRUCTIVE DIURESIS: ICD-10-CM

## 2025-02-27 DIAGNOSIS — I15.9 SECONDARY HYPERTENSION: ICD-10-CM

## 2025-02-27 DIAGNOSIS — I47.19 ATRIAL TACHYCARDIA (CMS-HCC): ICD-10-CM

## 2025-02-27 DIAGNOSIS — I48.91 ATRIAL FIBRILLATION, UNSPECIFIED TYPE (MULTI): ICD-10-CM

## 2025-02-27 DIAGNOSIS — E87.5 HYPERKALEMIA: ICD-10-CM

## 2025-02-27 DIAGNOSIS — I51.9 LEFT VENTRICULAR DIASTOLIC DYSFUNCTION: ICD-10-CM

## 2025-02-27 DIAGNOSIS — R00.0 WIDE-COMPLEX TACHYCARDIA: ICD-10-CM

## 2025-02-27 DIAGNOSIS — E66.812 CLASS 2 OBESITY WITH BODY MASS INDEX (BMI) OF 38.0 TO 38.9 IN ADULT, UNSPECIFIED OBESITY TYPE, UNSPECIFIED WHETHER SERIOUS COMORBIDITY PRESENT: ICD-10-CM

## 2025-02-27 DIAGNOSIS — I51.89 DIASTOLIC DYSFUNCTION: ICD-10-CM

## 2025-02-27 DIAGNOSIS — N19 UREMIA: ICD-10-CM

## 2025-02-27 PROBLEM — D72.828 NEUTROPHILIC LEUKOCYTOSIS: Status: ACTIVE | Noted: 2025-02-27

## 2025-02-27 PROBLEM — N30.01 ACUTE CYSTITIS WITH HEMATURIA: Status: ACTIVE | Noted: 2025-02-27

## 2025-02-27 PROBLEM — E83.41 HYPERMAGNESEMIA: Status: ACTIVE | Noted: 2025-02-27

## 2025-02-27 LAB
ALBUMIN SERPL BCP-MCNC: 4 G/DL (ref 3.4–5)
ALP SERPL-CCNC: 64 U/L (ref 33–136)
ALT SERPL W P-5'-P-CCNC: 10 U/L (ref 10–52)
ANION GAP SERPL CALC-SCNC: 21 MMOL/L (ref 10–20)
APPEARANCE UR: ABNORMAL
AST SERPL W P-5'-P-CCNC: 15 U/L (ref 9–39)
BACTERIA #/AREA URNS AUTO: ABNORMAL /HPF
BASOPHILS # BLD AUTO: 0.1 X10*3/UL (ref 0–0.1)
BASOPHILS NFR BLD AUTO: 0.8 %
BILIRUB SERPL-MCNC: 0.8 MG/DL (ref 0–1.2)
BILIRUB UR STRIP.AUTO-MCNC: ABNORMAL MG/DL
BNP SERPL-MCNC: 54 PG/ML (ref 0–99)
BUN SERPL-MCNC: 134 MG/DL (ref 6–23)
CALCIUM SERPL-MCNC: 10.9 MG/DL (ref 8.6–10.3)
CARDIAC TROPONIN I PNL SERPL HS: 51 NG/L (ref 0–20)
CARDIAC TROPONIN I PNL SERPL HS: 63 NG/L (ref 0–20)
CHLORIDE SERPL-SCNC: 97 MMOL/L (ref 98–107)
CO2 SERPL-SCNC: 21 MMOL/L (ref 21–32)
COLOR UR: YELLOW
CREAT SERPL-MCNC: 7.86 MG/DL (ref 0.5–1.3)
EGFRCR SERPLBLD CKD-EPI 2021: 7 ML/MIN/1.73M*2
EOSINOPHIL # BLD AUTO: 0.23 X10*3/UL (ref 0–0.4)
EOSINOPHIL NFR BLD AUTO: 1.8 %
ERYTHROCYTE [DISTWIDTH] IN BLOOD BY AUTOMATED COUNT: 12.4 % (ref 11.5–14.5)
GLUCOSE SERPL-MCNC: 121 MG/DL (ref 74–99)
GLUCOSE UR STRIP.AUTO-MCNC: NORMAL MG/DL
GRAN CASTS #/AREA UR COMP ASSIST: ABNORMAL /LPF
HCT VFR BLD AUTO: 34.5 % (ref 41–52)
HGB BLD-MCNC: 10.2 G/DL (ref 13.5–17.5)
HYALINE CASTS #/AREA URNS AUTO: ABNORMAL /LPF
IMM GRANULOCYTES # BLD AUTO: 0.06 X10*3/UL (ref 0–0.5)
IMM GRANULOCYTES NFR BLD AUTO: 0.5 % (ref 0–0.9)
INR PPP: 1.2 (ref 0.9–1.1)
KETONES UR STRIP.AUTO-MCNC: ABNORMAL MG/DL
LEUKOCYTE ESTERASE UR QL STRIP.AUTO: ABNORMAL
LYMPHOCYTES # BLD AUTO: 2.34 X10*3/UL (ref 0.8–3)
LYMPHOCYTES NFR BLD AUTO: 18.5 %
MAGNESIUM SERPL-MCNC: 3.07 MG/DL (ref 1.6–2.4)
MCH RBC QN AUTO: 28.9 PG (ref 26–34)
MCHC RBC AUTO-ENTMCNC: 29.6 G/DL (ref 32–36)
MCV RBC AUTO: 98 FL (ref 80–100)
MONOCYTES # BLD AUTO: 0.92 X10*3/UL (ref 0.05–0.8)
MONOCYTES NFR BLD AUTO: 7.3 %
MUCOUS THREADS #/AREA URNS AUTO: ABNORMAL /LPF
NEUTROPHILS # BLD AUTO: 9.03 X10*3/UL (ref 1.6–5.5)
NEUTROPHILS NFR BLD AUTO: 71.1 %
NITRITE UR QL STRIP.AUTO: NEGATIVE
NRBC BLD-RTO: 0 /100 WBCS (ref 0–0)
PH UR STRIP.AUTO: 5 [PH]
PLATELET # BLD AUTO: 356 X10*3/UL (ref 150–450)
POTASSIUM SERPL-SCNC: 5.3 MMOL/L (ref 3.5–5.3)
PROT SERPL-MCNC: 8.4 G/DL (ref 6.4–8.2)
PROT UR STRIP.AUTO-MCNC: ABNORMAL MG/DL
PROTHROMBIN TIME: 13.5 SECONDS (ref 9.8–12.4)
RBC # BLD AUTO: 3.53 X10*6/UL (ref 4.5–5.9)
RBC # UR STRIP.AUTO: NEGATIVE MG/DL
RBC #/AREA URNS AUTO: ABNORMAL /HPF
SODIUM SERPL-SCNC: 134 MMOL/L (ref 136–145)
SP GR UR STRIP.AUTO: 1.02
SQUAMOUS #/AREA URNS AUTO: ABNORMAL /HPF
UROBILINOGEN UR STRIP.AUTO-MCNC: ABNORMAL MG/DL
WBC # BLD AUTO: 12.7 X10*3/UL (ref 4.4–11.3)
WBC #/AREA URNS AUTO: ABNORMAL /HPF

## 2025-02-27 PROCEDURE — 99024 POSTOP FOLLOW-UP VISIT: CPT | Performed by: UROLOGY

## 2025-02-27 PROCEDURE — 93005 ELECTROCARDIOGRAM TRACING: CPT

## 2025-02-27 PROCEDURE — 2500000002 HC RX 250 W HCPCS SELF ADMINISTERED DRUGS (ALT 637 FOR MEDICARE OP, ALT 636 FOR OP/ED)

## 2025-02-27 PROCEDURE — 84484 ASSAY OF TROPONIN QUANT: CPT | Performed by: STUDENT IN AN ORGANIZED HEALTH CARE EDUCATION/TRAINING PROGRAM

## 2025-02-27 PROCEDURE — 2060000001 HC INTERMEDIATE ICU ROOM DAILY

## 2025-02-27 PROCEDURE — 2500000005 HC RX 250 GENERAL PHARMACY W/O HCPCS

## 2025-02-27 PROCEDURE — 2500000004 HC RX 250 GENERAL PHARMACY W/ HCPCS (ALT 636 FOR OP/ED)

## 2025-02-27 PROCEDURE — 36415 COLL VENOUS BLD VENIPUNCTURE: CPT | Performed by: STUDENT IN AN ORGANIZED HEALTH CARE EDUCATION/TRAINING PROGRAM

## 2025-02-27 PROCEDURE — 51798 US URINE CAPACITY MEASURE: CPT

## 2025-02-27 PROCEDURE — 71045 X-RAY EXAM CHEST 1 VIEW: CPT

## 2025-02-27 PROCEDURE — 83880 ASSAY OF NATRIURETIC PEPTIDE: CPT | Performed by: STUDENT IN AN ORGANIZED HEALTH CARE EDUCATION/TRAINING PROGRAM

## 2025-02-27 PROCEDURE — 3078F DIAST BP <80 MM HG: CPT | Performed by: UROLOGY

## 2025-02-27 PROCEDURE — 99285 EMERGENCY DEPT VISIT HI MDM: CPT | Mod: 25 | Performed by: STUDENT IN AN ORGANIZED HEALTH CARE EDUCATION/TRAINING PROGRAM

## 2025-02-27 PROCEDURE — 81001 URINALYSIS AUTO W/SCOPE: CPT

## 2025-02-27 PROCEDURE — 71045 X-RAY EXAM CHEST 1 VIEW: CPT | Performed by: RADIOLOGY

## 2025-02-27 PROCEDURE — 85610 PROTHROMBIN TIME: CPT | Performed by: STUDENT IN AN ORGANIZED HEALTH CARE EDUCATION/TRAINING PROGRAM

## 2025-02-27 PROCEDURE — 74176 CT ABD & PELVIS W/O CONTRAST: CPT | Mod: FOREIGN READ | Performed by: RADIOLOGY

## 2025-02-27 PROCEDURE — 85025 COMPLETE CBC W/AUTO DIFF WBC: CPT | Performed by: STUDENT IN AN ORGANIZED HEALTH CARE EDUCATION/TRAINING PROGRAM

## 2025-02-27 PROCEDURE — 3074F SYST BP LT 130 MM HG: CPT | Performed by: UROLOGY

## 2025-02-27 PROCEDURE — 74176 CT ABD & PELVIS W/O CONTRAST: CPT

## 2025-02-27 PROCEDURE — 87086 URINE CULTURE/COLONY COUNT: CPT | Mod: STJLAB

## 2025-02-27 PROCEDURE — 87040 BLOOD CULTURE FOR BACTERIA: CPT | Mod: STJLAB | Performed by: STUDENT IN AN ORGANIZED HEALTH CARE EDUCATION/TRAINING PROGRAM

## 2025-02-27 PROCEDURE — 1159F MED LIST DOCD IN RCRD: CPT | Performed by: UROLOGY

## 2025-02-27 PROCEDURE — 51702 INSERT TEMP BLADDER CATH: CPT

## 2025-02-27 PROCEDURE — 2500000004 HC RX 250 GENERAL PHARMACY W/ HCPCS (ALT 636 FOR OP/ED): Performed by: STUDENT IN AN ORGANIZED HEALTH CARE EDUCATION/TRAINING PROGRAM

## 2025-02-27 PROCEDURE — 80053 COMPREHEN METABOLIC PANEL: CPT | Performed by: STUDENT IN AN ORGANIZED HEALTH CARE EDUCATION/TRAINING PROGRAM

## 2025-02-27 PROCEDURE — 99285 EMERGENCY DEPT VISIT HI MDM: CPT | Performed by: STUDENT IN AN ORGANIZED HEALTH CARE EDUCATION/TRAINING PROGRAM

## 2025-02-27 PROCEDURE — 2500000001 HC RX 250 WO HCPCS SELF ADMINISTERED DRUGS (ALT 637 FOR MEDICARE OP)

## 2025-02-27 PROCEDURE — 96374 THER/PROPH/DIAG INJ IV PUSH: CPT | Mod: 59

## 2025-02-27 PROCEDURE — 96361 HYDRATE IV INFUSION ADD-ON: CPT | Mod: 59

## 2025-02-27 PROCEDURE — 83735 ASSAY OF MAGNESIUM: CPT | Performed by: STUDENT IN AN ORGANIZED HEALTH CARE EDUCATION/TRAINING PROGRAM

## 2025-02-27 RX ORDER — ACETAMINOPHEN 650 MG/1
650 SUPPOSITORY RECTAL EVERY 6 HOURS PRN
Status: DISCONTINUED | OUTPATIENT
Start: 2025-02-27 | End: 2025-03-04 | Stop reason: HOSPADM

## 2025-02-27 RX ORDER — SODIUM CHLORIDE 9 MG/ML
100 INJECTION, SOLUTION INTRAVENOUS CONTINUOUS
Status: ACTIVE | OUTPATIENT
Start: 2025-02-28 | End: 2025-03-01

## 2025-02-27 RX ORDER — TALC
3 POWDER (GRAM) TOPICAL NIGHTLY PRN
Status: DISCONTINUED | OUTPATIENT
Start: 2025-02-27 | End: 2025-03-04 | Stop reason: HOSPADM

## 2025-02-27 RX ORDER — AMLODIPINE BESYLATE 10 MG/1
10 TABLET ORAL DAILY
Status: DISCONTINUED | OUTPATIENT
Start: 2025-02-28 | End: 2025-03-04 | Stop reason: HOSPADM

## 2025-02-27 RX ORDER — ACETAMINOPHEN 160 MG/5ML
650 SOLUTION ORAL EVERY 6 HOURS PRN
Status: DISCONTINUED | OUTPATIENT
Start: 2025-02-27 | End: 2025-03-04 | Stop reason: HOSPADM

## 2025-02-27 RX ORDER — HEPARIN SODIUM 5000 [USP'U]/ML
5000 INJECTION, SOLUTION INTRAVENOUS; SUBCUTANEOUS EVERY 8 HOURS
Status: DISCONTINUED | OUTPATIENT
Start: 2025-02-27 | End: 2025-03-04 | Stop reason: HOSPADM

## 2025-02-27 RX ORDER — ACETAMINOPHEN 325 MG/1
650 TABLET ORAL EVERY 6 HOURS PRN
Status: DISCONTINUED | OUTPATIENT
Start: 2025-02-27 | End: 2025-03-04 | Stop reason: HOSPADM

## 2025-02-27 RX ORDER — DORZOLAMIDE HYDROCHLORIDE AND TIMOLOL MALEATE 20; 5 MG/ML; MG/ML
1 SOLUTION/ DROPS OPHTHALMIC 2 TIMES DAILY
Status: DISCONTINUED | OUTPATIENT
Start: 2025-02-27 | End: 2025-03-04 | Stop reason: HOSPADM

## 2025-02-27 RX ORDER — SIMVASTATIN 20 MG/1
20 TABLET, FILM COATED ORAL NIGHTLY
Status: DISCONTINUED | OUTPATIENT
Start: 2025-02-27 | End: 2025-03-03

## 2025-02-27 RX ORDER — IBUPROFEN 200 MG
200 CAPSULE ORAL DAILY
Status: DISCONTINUED | OUTPATIENT
Start: 2025-02-28 | End: 2025-02-28

## 2025-02-27 RX ORDER — DORZOLAMIDE HYDROCHLORIDE AND TIMOLOL MALEATE 20; 5 MG/ML; MG/ML
1 SOLUTION/ DROPS OPHTHALMIC 2 TIMES DAILY
COMMUNITY

## 2025-02-27 RX ORDER — LIDOCAINE HYDROCHLORIDE 20 MG/ML
1 JELLY TOPICAL ONCE
Status: COMPLETED | OUTPATIENT
Start: 2025-02-27 | End: 2025-02-27

## 2025-02-27 RX ORDER — HYDROCHLOROTHIAZIDE 12.5 MG/1
12.5 TABLET ORAL DAILY
Status: DISCONTINUED | OUTPATIENT
Start: 2025-02-28 | End: 2025-03-04 | Stop reason: HOSPADM

## 2025-02-27 RX ORDER — LATANOPROST 50 UG/ML
1 SOLUTION/ DROPS OPHTHALMIC NIGHTLY
Status: DISCONTINUED | OUTPATIENT
Start: 2025-02-27 | End: 2025-03-04 | Stop reason: HOSPADM

## 2025-02-27 RX ORDER — POLYETHYLENE GLYCOL 3350 17 G/17G
17 POWDER, FOR SOLUTION ORAL DAILY PRN
Status: DISCONTINUED | OUTPATIENT
Start: 2025-02-27 | End: 2025-03-04 | Stop reason: HOSPADM

## 2025-02-27 RX ORDER — LISINOPRIL 20 MG/1
20 TABLET ORAL DAILY
Status: DISCONTINUED | OUTPATIENT
Start: 2025-02-28 | End: 2025-03-04 | Stop reason: HOSPADM

## 2025-02-27 RX ADMIN — CEFTRIAXONE 2 G: 2 INJECTION, POWDER, FOR SOLUTION INTRAMUSCULAR; INTRAVENOUS at 21:57

## 2025-02-27 RX ADMIN — LIDOCAINE HYDROCHLORIDE 1 APPLICATION: 20 JELLY TOPICAL at 22:08

## 2025-02-27 RX ADMIN — SODIUM BICARBONATE 100 ML/HR: 84 INJECTION, SOLUTION INTRAVENOUS at 22:17

## 2025-02-27 RX ADMIN — LATANOPROST 1 DROP: 50 SOLUTION OPHTHALMIC at 23:39

## 2025-02-27 RX ADMIN — HEPARIN SODIUM 5000 UNITS: 5000 INJECTION, SOLUTION INTRAVENOUS; SUBCUTANEOUS at 23:36

## 2025-02-27 RX ADMIN — SIMVASTATIN 20 MG: 20 TABLET, FILM COATED ORAL at 23:35

## 2025-02-27 RX ADMIN — SODIUM CHLORIDE 1000 ML: 9 INJECTION, SOLUTION INTRAVENOUS at 21:56

## 2025-02-27 RX ADMIN — SODIUM CHLORIDE 1000 ML: 9 INJECTION, SOLUTION INTRAVENOUS at 17:58

## 2025-02-27 SDOH — ECONOMIC STABILITY: INCOME INSECURITY: IN THE PAST 12 MONTHS HAS THE ELECTRIC, GAS, OIL, OR WATER COMPANY THREATENED TO SHUT OFF SERVICES IN YOUR HOME?: NO

## 2025-02-27 SDOH — SOCIAL STABILITY: SOCIAL INSECURITY
WITHIN THE LAST YEAR, HAVE YOU BEEN RAPED OR FORCED TO HAVE ANY KIND OF SEXUAL ACTIVITY BY YOUR PARTNER OR EX-PARTNER?: NO

## 2025-02-27 SDOH — SOCIAL STABILITY: SOCIAL INSECURITY: HAS ANYONE EVER THREATENED TO HURT YOUR FAMILY OR YOUR PETS?: NO

## 2025-02-27 SDOH — SOCIAL STABILITY: SOCIAL INSECURITY: WERE YOU ABLE TO COMPLETE ALL THE BEHAVIORAL HEALTH SCREENINGS?: YES

## 2025-02-27 SDOH — ECONOMIC STABILITY: FOOD INSECURITY: WITHIN THE PAST 12 MONTHS, THE FOOD YOU BOUGHT JUST DIDN'T LAST AND YOU DIDN'T HAVE MONEY TO GET MORE.: NEVER TRUE

## 2025-02-27 SDOH — ECONOMIC STABILITY: FOOD INSECURITY: WITHIN THE PAST 12 MONTHS, YOU WORRIED THAT YOUR FOOD WOULD RUN OUT BEFORE YOU GOT THE MONEY TO BUY MORE.: NEVER TRUE

## 2025-02-27 SDOH — SOCIAL STABILITY: SOCIAL INSECURITY: WITHIN THE LAST YEAR, HAVE YOU BEEN HUMILIATED OR EMOTIONALLY ABUSED IN OTHER WAYS BY YOUR PARTNER OR EX-PARTNER?: NO

## 2025-02-27 SDOH — SOCIAL STABILITY: SOCIAL INSECURITY: DOES ANYONE TRY TO KEEP YOU FROM HAVING/CONTACTING OTHER FRIENDS OR DOING THINGS OUTSIDE YOUR HOME?: NO

## 2025-02-27 SDOH — SOCIAL STABILITY: SOCIAL INSECURITY
WITHIN THE LAST YEAR, HAVE YOU BEEN KICKED, HIT, SLAPPED, OR OTHERWISE PHYSICALLY HURT BY YOUR PARTNER OR EX-PARTNER?: NO

## 2025-02-27 SDOH — SOCIAL STABILITY: SOCIAL INSECURITY: ABUSE: ADULT

## 2025-02-27 SDOH — SOCIAL STABILITY: SOCIAL INSECURITY: DO YOU FEEL UNSAFE GOING BACK TO THE PLACE WHERE YOU ARE LIVING?: NO

## 2025-02-27 SDOH — SOCIAL STABILITY: SOCIAL INSECURITY: HAVE YOU HAD THOUGHTS OF HARMING ANYONE ELSE?: NO

## 2025-02-27 SDOH — SOCIAL STABILITY: SOCIAL INSECURITY: WITHIN THE LAST YEAR, HAVE YOU BEEN AFRAID OF YOUR PARTNER OR EX-PARTNER?: NO

## 2025-02-27 SDOH — SOCIAL STABILITY: SOCIAL INSECURITY: ARE THERE ANY APPARENT SIGNS OF INJURIES/BEHAVIORS THAT COULD BE RELATED TO ABUSE/NEGLECT?: NO

## 2025-02-27 SDOH — SOCIAL STABILITY: SOCIAL INSECURITY: ARE YOU OR HAVE YOU BEEN THREATENED OR ABUSED PHYSICALLY, EMOTIONALLY, OR SEXUALLY BY ANYONE?: NO

## 2025-02-27 SDOH — SOCIAL STABILITY: SOCIAL INSECURITY: DO YOU FEEL ANYONE HAS EXPLOITED OR TAKEN ADVANTAGE OF YOU FINANCIALLY OR OF YOUR PERSONAL PROPERTY?: NO

## 2025-02-27 ASSESSMENT — COGNITIVE AND FUNCTIONAL STATUS - GENERAL
WALKING IN HOSPITAL ROOM: A LITTLE
MOBILITY SCORE: 22
PATIENT BASELINE BEDBOUND: NO
CLIMB 3 TO 5 STEPS WITH RAILING: A LITTLE
DAILY ACTIVITIY SCORE: 24

## 2025-02-27 ASSESSMENT — PATIENT HEALTH QUESTIONNAIRE - PHQ9
SUM OF ALL RESPONSES TO PHQ9 QUESTIONS 1 & 2: 0
1. LITTLE INTEREST OR PLEASURE IN DOING THINGS: NOT AT ALL
2. FEELING DOWN, DEPRESSED OR HOPELESS: NOT AT ALL

## 2025-02-27 ASSESSMENT — ACTIVITIES OF DAILY LIVING (ADL)
LACK_OF_TRANSPORTATION: NO
GROOMING: INDEPENDENT
BATHING: INDEPENDENT
HEARING - LEFT EAR: FUNCTIONAL
ADEQUATE_TO_COMPLETE_ADL: YES
FEEDING YOURSELF: INDEPENDENT
JUDGMENT_ADEQUATE_SAFELY_COMPLETE_DAILY_ACTIVITIES: YES
PATIENT'S MEMORY ADEQUATE TO SAFELY COMPLETE DAILY ACTIVITIES?: YES
HEARING - RIGHT EAR: FUNCTIONAL
DRESSING YOURSELF: INDEPENDENT
LACK_OF_TRANSPORTATION: NO
TOILETING: INDEPENDENT
WALKS IN HOME: INDEPENDENT

## 2025-02-27 ASSESSMENT — LIFESTYLE VARIABLES
HOW MANY STANDARD DRINKS CONTAINING ALCOHOL DO YOU HAVE ON A TYPICAL DAY: PATIENT DOES NOT DRINK
HOW OFTEN DO YOU HAVE A DRINK CONTAINING ALCOHOL: NEVER
EVER HAD A DRINK FIRST THING IN THE MORNING TO STEADY YOUR NERVES TO GET RID OF A HANGOVER: NO
EVER FELT BAD OR GUILTY ABOUT YOUR DRINKING: NO
AUDIT-C TOTAL SCORE: 0
SKIP TO QUESTIONS 9-10: 1
HOW OFTEN DO YOU HAVE 6 OR MORE DRINKS ON ONE OCCASION: NEVER
AUDIT-C TOTAL SCORE: 0
TOTAL SCORE: 0
HAVE PEOPLE ANNOYED YOU BY CRITICIZING YOUR DRINKING: NO
HAVE YOU EVER FELT YOU SHOULD CUT DOWN ON YOUR DRINKING: NO

## 2025-02-27 ASSESSMENT — COLUMBIA-SUICIDE SEVERITY RATING SCALE - C-SSRS
6. HAVE YOU EVER DONE ANYTHING, STARTED TO DO ANYTHING, OR PREPARED TO DO ANYTHING TO END YOUR LIFE?: NO
2. HAVE YOU ACTUALLY HAD ANY THOUGHTS OF KILLING YOURSELF?: NO
1. IN THE PAST MONTH, HAVE YOU WISHED YOU WERE DEAD OR WISHED YOU COULD GO TO SLEEP AND NOT WAKE UP?: NO

## 2025-02-27 ASSESSMENT — PAIN SCALES - GENERAL
PAINLEVEL_OUTOF10: 0 - NO PAIN

## 2025-02-27 ASSESSMENT — PAIN - FUNCTIONAL ASSESSMENT
PAIN_FUNCTIONAL_ASSESSMENT: 0-10
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
## 2025-02-27 NOTE — PROGRESS NOTES
Subjective   Patient ID: Wisam Austin is a 75 y.o. male who presents for Symptoms check. Last seen 1/28/25 when The patient will dress the wound with dry guaze. I encouraged him to keep the area clean and dry. He does not need to apply ointment. We discussed that the biopsy was negative for cancer. Order pyridium to help with dysuria. We will see the patient back in 1 month for wound check.     HPI  The patient's BP today is very low at 88/61.   The patient relates that he if feeling weaker and weaker.   The patient denies trouble with urination.     Review of Systems  A 12 system review was completed and is negative with the exception of those signs and symptoms noted in the history of present illness.    Objective   Physical Exam  General: in NAD, appears stated age  Head: normocephalic, atraumatic  Respiratory: normal effort, no use of accessory muscles  Cardiovascular: no edema noted  Skin: normal turgor, no rashes  Neurologic: grossly intact, oriented to person/place/time  Psychiatric: mode and affect appropriate   Incisions: Clean, dry, and intact, circumcision incision well-healed, penile incision well-healed     Assessment/Plan     The patient's BP today is very low at 88/61. The patient's wound is well-healed. I recommend that the patient go to the ER to be evaluated for low BP, as I cannot relate this to any wound infection.  Follow-up prn.       Scribe Attestation  By signing my name below, I, Magda Bradley Scrnubia   attest that this documentation has been prepared under the direction and in the presence of Andre Storey MD.

## 2025-02-28 PROBLEM — R79.89 ELEVATED TROPONIN: Status: ACTIVE | Noted: 2025-02-28

## 2025-02-28 PROBLEM — E83.52 HYPERCALCEMIA: Status: ACTIVE | Noted: 2025-02-28

## 2025-02-28 PROBLEM — N40.0 ENLARGED PROSTATE: Status: ACTIVE | Noted: 2025-02-28

## 2025-02-28 LAB
ANION GAP SERPL CALC-SCNC: 18 MMOL/L (ref 10–20)
ATRIAL RATE: 72 BPM
BACTERIA UR CULT: NORMAL
BUN SERPL-MCNC: 116 MG/DL (ref 6–23)
CALCIUM SERPL-MCNC: 9.9 MG/DL (ref 8.6–10.3)
CARDIAC TROPONIN I PNL SERPL HS: 41 NG/L (ref 0–20)
CHLORIDE SERPL-SCNC: 100 MMOL/L (ref 98–107)
CO2 SERPL-SCNC: 21 MMOL/L (ref 21–32)
CREAT SERPL-MCNC: 5.62 MG/DL (ref 0.5–1.3)
EGFRCR SERPLBLD CKD-EPI 2021: 10 ML/MIN/1.73M*2
ERYTHROCYTE [DISTWIDTH] IN BLOOD BY AUTOMATED COUNT: 12.4 % (ref 11.5–14.5)
GLUCOSE SERPL-MCNC: 175 MG/DL (ref 74–99)
HCT VFR BLD AUTO: 29 % (ref 41–52)
HGB BLD-MCNC: 8.9 G/DL (ref 13.5–17.5)
HOLD SPECIMEN: NORMAL
MAGNESIUM SERPL-MCNC: 2.74 MG/DL (ref 1.6–2.4)
MCH RBC QN AUTO: 28.7 PG (ref 26–34)
MCHC RBC AUTO-ENTMCNC: 30.7 G/DL (ref 32–36)
MCV RBC AUTO: 94 FL (ref 80–100)
NRBC BLD-RTO: 0 /100 WBCS (ref 0–0)
P AXIS: 76 DEGREES
P OFFSET: 206 MS
P ONSET: 144 MS
PLATELET # BLD AUTO: 283 X10*3/UL (ref 150–450)
POTASSIUM SERPL-SCNC: 4.1 MMOL/L (ref 3.5–5.3)
PR INTERVAL: 158 MS
Q ONSET: 223 MS
QRS COUNT: 12 BEATS
QRS DURATION: 86 MS
QT INTERVAL: 376 MS
QTC CALCULATION(BAZETT): 411 MS
QTC FREDERICIA: 399 MS
R AXIS: 48 DEGREES
RBC # BLD AUTO: 3.1 X10*6/UL (ref 4.5–5.9)
SODIUM SERPL-SCNC: 135 MMOL/L (ref 136–145)
T AXIS: 54 DEGREES
T OFFSET: 411 MS
VENTRICULAR RATE: 72 BPM
WBC # BLD AUTO: 8.8 X10*3/UL (ref 4.4–11.3)

## 2025-02-28 PROCEDURE — 97161 PT EVAL LOW COMPLEX 20 MIN: CPT | Mod: GP

## 2025-02-28 PROCEDURE — 99222 1ST HOSP IP/OBS MODERATE 55: CPT | Performed by: UROLOGY

## 2025-02-28 PROCEDURE — 80048 BASIC METABOLIC PNL TOTAL CA: CPT

## 2025-02-28 PROCEDURE — 85027 COMPLETE CBC AUTOMATED: CPT

## 2025-02-28 PROCEDURE — 36415 COLL VENOUS BLD VENIPUNCTURE: CPT

## 2025-02-28 PROCEDURE — 2500000004 HC RX 250 GENERAL PHARMACY W/ HCPCS (ALT 636 FOR OP/ED)

## 2025-02-28 PROCEDURE — 99222 1ST HOSP IP/OBS MODERATE 55: CPT | Performed by: PSYCHIATRY & NEUROLOGY

## 2025-02-28 PROCEDURE — 2500000005 HC RX 250 GENERAL PHARMACY W/O HCPCS

## 2025-02-28 PROCEDURE — 83735 ASSAY OF MAGNESIUM: CPT

## 2025-02-28 PROCEDURE — 51702 INSERT TEMP BLADDER CATH: CPT

## 2025-02-28 PROCEDURE — 84484 ASSAY OF TROPONIN QUANT: CPT

## 2025-02-28 PROCEDURE — 2060000001 HC INTERMEDIATE ICU ROOM DAILY

## 2025-02-28 PROCEDURE — 2500000002 HC RX 250 W HCPCS SELF ADMINISTERED DRUGS (ALT 637 FOR MEDICARE OP, ALT 636 FOR OP/ED)

## 2025-02-28 PROCEDURE — 2500000001 HC RX 250 WO HCPCS SELF ADMINISTERED DRUGS (ALT 637 FOR MEDICARE OP)

## 2025-02-28 RX ORDER — CYCLOBENZAPRINE HCL 5 MG
5 TABLET ORAL ONCE
Status: COMPLETED | OUTPATIENT
Start: 2025-02-28 | End: 2025-02-28

## 2025-02-28 RX ADMIN — SODIUM CHLORIDE 100 ML/HR: 9 INJECTION, SOLUTION INTRAVENOUS at 19:13

## 2025-02-28 RX ADMIN — CYCLOBENZAPRINE HYDROCHLORIDE 5 MG: 5 TABLET, FILM COATED ORAL at 03:11

## 2025-02-28 RX ADMIN — SIMVASTATIN 20 MG: 20 TABLET, FILM COATED ORAL at 20:30

## 2025-02-28 RX ADMIN — LATANOPROST 1 DROP: 50 SOLUTION OPHTHALMIC at 20:30

## 2025-02-28 RX ADMIN — Medication 3 MG: at 03:25

## 2025-02-28 RX ADMIN — SODIUM CHLORIDE 100 ML/HR: 9 INJECTION, SOLUTION INTRAVENOUS at 08:27

## 2025-02-28 RX ADMIN — HYDROMORPHONE HYDROCHLORIDE 0.4 MG: 1 INJECTION, SOLUTION INTRAMUSCULAR; INTRAVENOUS; SUBCUTANEOUS at 00:54

## 2025-02-28 RX ADMIN — Medication 3 MG: at 21:57

## 2025-02-28 RX ADMIN — HEPARIN SODIUM 5000 UNITS: 5000 INJECTION, SOLUTION INTRAVENOUS; SUBCUTANEOUS at 14:39

## 2025-02-28 RX ADMIN — CEFTRIAXONE 1 G: 1 INJECTION, POWDER, FOR SOLUTION INTRAMUSCULAR; INTRAVENOUS at 18:28

## 2025-02-28 RX ADMIN — HEPARIN SODIUM 5000 UNITS: 5000 INJECTION, SOLUTION INTRAVENOUS; SUBCUTANEOUS at 08:27

## 2025-02-28 SDOH — ECONOMIC STABILITY: TRANSPORTATION INSECURITY: IN THE PAST 12 MONTHS, HAS LACK OF TRANSPORTATION KEPT YOU FROM MEDICAL APPOINTMENTS OR FROM GETTING MEDICATIONS?: NO

## 2025-02-28 SDOH — ECONOMIC STABILITY: FOOD INSECURITY: WITHIN THE PAST 12 MONTHS, YOU WORRIED THAT YOUR FOOD WOULD RUN OUT BEFORE YOU GOT THE MONEY TO BUY MORE.: NEVER TRUE

## 2025-02-28 SDOH — ECONOMIC STABILITY: HOUSING INSECURITY: AT ANY TIME IN THE PAST 12 MONTHS, WERE YOU HOMELESS OR LIVING IN A SHELTER (INCLUDING NOW)?: NO

## 2025-02-28 SDOH — ECONOMIC STABILITY: FOOD INSECURITY: WITHIN THE PAST 12 MONTHS, THE FOOD YOU BOUGHT JUST DIDN'T LAST AND YOU DIDN'T HAVE MONEY TO GET MORE.: NEVER TRUE

## 2025-02-28 SDOH — ECONOMIC STABILITY: INCOME INSECURITY: IN THE PAST 12 MONTHS HAS THE ELECTRIC, GAS, OIL, OR WATER COMPANY THREATENED TO SHUT OFF SERVICES IN YOUR HOME?: NO

## 2025-02-28 SDOH — ECONOMIC STABILITY: HOUSING INSECURITY: IN THE LAST 12 MONTHS, WAS THERE A TIME WHEN YOU WERE NOT ABLE TO PAY THE MORTGAGE OR RENT ON TIME?: NO

## 2025-02-28 SDOH — ECONOMIC STABILITY: HOUSING INSECURITY: IN THE PAST 12 MONTHS, HOW MANY TIMES HAVE YOU MOVED WHERE YOU WERE LIVING?: 0

## 2025-02-28 SDOH — ECONOMIC STABILITY: FOOD INSECURITY: HOW HARD IS IT FOR YOU TO PAY FOR THE VERY BASICS LIKE FOOD, HOUSING, MEDICAL CARE, AND HEATING?: NOT HARD AT ALL

## 2025-02-28 ASSESSMENT — COGNITIVE AND FUNCTIONAL STATUS - GENERAL
DAILY ACTIVITIY SCORE: 24
WALKING IN HOSPITAL ROOM: A LITTLE
CLIMB 3 TO 5 STEPS WITH RAILING: A LITTLE
MOBILITY SCORE: 22
MOBILITY SCORE: 24

## 2025-02-28 ASSESSMENT — PAIN - FUNCTIONAL ASSESSMENT
PAIN_FUNCTIONAL_ASSESSMENT: 0-10

## 2025-02-28 ASSESSMENT — PAIN SCALES - GENERAL
PAINLEVEL_OUTOF10: 5 - MODERATE PAIN
PAINLEVEL_OUTOF10: 5 - MODERATE PAIN
PAINLEVEL_OUTOF10: 10 - WORST POSSIBLE PAIN
PAINLEVEL_OUTOF10: 10 - WORST POSSIBLE PAIN
PAINLEVEL_OUTOF10: 5 - MODERATE PAIN
PAINLEVEL_OUTOF10: 5 - MODERATE PAIN

## 2025-02-28 ASSESSMENT — PAIN DESCRIPTION - DESCRIPTORS: DESCRIPTORS: BURNING

## 2025-02-28 ASSESSMENT — ACTIVITIES OF DAILY LIVING (ADL)
LACK_OF_TRANSPORTATION: NO
LACK_OF_TRANSPORTATION: NO

## 2025-02-28 NOTE — PROGRESS NOTES
"Nutrition Initial Assessment:   Nutrition Assessment    Reason for Assessment: Admission nursing screening    Nutrition Note:  Wisam Austin is a 75 y.o. male presenting  from physician office with hypotension. Work up revealing MOJGAN/UTI/enlarged prostate. Nephrology on consult with no current plan for HD.     Past Medical History   has a past medical history of Cataract, Colon polyps, COVID-19, Erectile dysfunction, Glaucoma, Hyperlipidemia, Hypertension, Myocardial infarction (Multi), Penile lesion, and Wears glasses.  Surgical History   has a past surgical history that includes Cataract extraction; Colonoscopy; Other surgical history (2025); Circumcision, non-; and Trabeculectomy.       Nutrition History:  Energy Intake: Fair 50-75 %, Poor < 50 %  Pain affecting nutrition status: N/A  Food and Nutrient History: : Met with pt at bedside. Pt reports fair appetite PTA \"I just eat basic foods.\" Admits to decreased appetite since not feeling well.  NKFA/intolerances. Pt hopeful for discharge soon.  Vitamin/Herbal Supplement Use: calcium citrated and MVI  Food Allergy:  (NKFA)  Food Intolerance:  (no food intolerances reported)       Anthropometrics:  Height: 167.6 cm (5' 6\")   Weight: 92.4 kg (203 lb 11.3 oz)   BMI (Calculated): 32.89   Pt currently denying weight loss; wants to go home soon.   IBW 64.5kg   0-10 (Numeric) Pain Score: 5 - Moderate pain     Weight History:   2025: 108kg  10/2024: 107kg  Weight Change %:  Significant Weight Loss:  (pt denies)    Nutrition Focused Physical Exam Findings:    Subcutaneous Fat Loss:   Defer Subcutaneous Fat Loss Assessment: Defer all  Defer All Reason: per pt request  Muscle Wasting:  Defer Muscle Wasting Assessment: Defer all  Defer All Reason: per pt request  Edema:  Edema: none  Physical Findings:  Skin:  (intact)    Nutrition Significant Labs:  BMP Trend:   Results from last 7 days   Lab Units 25  0718 25  1653   GLUCOSE mg/dL 175* 121* "   CALCIUM mg/dL 9.9 10.9*   SODIUM mmol/L 135* 134*   POTASSIUM mmol/L 4.1 5.3   CO2 mmol/L 21 21   CHLORIDE mmol/L 100 97*   BUN mg/dL 116* 134*   CREATININE mg/dL 5.62* 7.86*    , A1C:  Lab Results   Component Value Date    HGBA1C 4.6 (L) 05/21/2022   , BG POCT trend:    , Liver Function Trend:   Results from last 7 days   Lab Units 02/27/25  1653   ALK PHOS U/L 64   AST U/L 15   ALT U/L 10   BILIRUBIN TOTAL mg/dL 0.8        Nutrition Specific Medications:  Scheduled medications  amLODIPine, 10 mg, oral, Daily  cefTRIAXone, 1 g, intravenous, q24h  [Held by provider] dorzolamide-timoloL, 1 drop, Both Eyes, BID  heparin (porcine), 5,000 Units, subcutaneous, q8h  [Held by provider] hydroCHLOROthiazide, 12.5 mg, oral, Daily  latanoprost, 1 drop, Both Eyes, Nightly  [Held by provider] lisinopril, 20 mg, oral, Daily  simvastatin, 20 mg, oral, Nightly      Continuous medications  sodium chloride 0.9%, 100 mL/hr, Last Rate: 100 mL/hr (02/28/25 0827)      PRN medications  PRN medications: acetaminophen **OR** acetaminophen **OR** acetaminophen, melatonin, polyethylene glycol     I/O:    ;      Dietary Orders (From admission, onward)       Start     Ordered    02/28/25 1008  Adult diet Renal; Potassium Restricted 2 gm (50mEq); 2 - 3 grams Sodium  Diet effective now        Question Answer Comment   Diet type Renal    Potassium restriction: Potassium Restricted 2 gm (50mEq)    Sodium restriction: 2 - 3 grams Sodium        02/28/25 1008    02/27/25 2319  May Participate in Room Service  ( ROOM SERVICE MAY PARTICIPATE)  Once        Question:  .  Answer:  Yes    02/27/25 2318                     Estimated Needs:   Total Energy Estimated Needs in 24 hours (kCal):  (2100-2300kcal (23-25kcal/kg of 92.4kg))     Total Protein Estimated Needs in 24 Hours (g):  (65-80g (1-1.3kg per kg 64.5kg IBW))     Total Fluid Estimated Needs in 24 Hours (mL):  (1mL/kcal/d or as per physician)           Nutrition Diagnosis   Malnutrition  Diagnosis  Patient has Malnutrition Diagnosis: No (at risk)    Nutrition Diagnosis  Patient has Nutrition Diagnosis: Yes  Diagnosis Status (1): New  Nutrition Diagnosis 1: Increased nutrient needs  Related to (1): acute metabolic stress  As Evidenced by (1): infection, MOJGAN, and inconsistent oral intakes.       Nutrition Interventions/Recommendations   Nutrition prescription for oral nutrition    Nutrition Recommendations:  Individualized Nutrition Prescription Provided for : oral diet will provide >75% of estimated nutrient needs    Nutrition Interventions/Goals:   Interventions: Meals and snacks, Medical food supplement  Meals and Snacks: Mineral-modified diet  Goal: continue with renal diet as ordered.  Medical Food Supplement: Commercial beverage medical food supplement therapy, Commercial food medical food supplement therapy  Goal: currently declines  Coordination of Care with Providers: Nursing      Education documentation   2/28: met with pt at bedside. Provided pt with renal menu in an attempt to make meal ordering easier; AYR reviewed. Rationale for renal diet discussed--pt not interested in education at this time; wanting to sleep. Pt too stated hopeful for discharge soon.         Nutrition Monitoring and Evaluation   Food/Nutrient Related History Monitoring  Monitoring and Evaluation Plan: Intake / amount of food  Intake / Amount of food: Consumes at least 50% or more of meals/snacks/supplements    Anthropometric Measurements  Monitoring and Evaluation Plan: Body weight  Body Weight: Measured body weight  Criteria: daily weight    Biochemical Data, Medical Tests and Procedures  Monitoring and Evaluation Plan: Electrolyte/renal panel  Electrolyte and Renal Panel: Electrolytes within normal limits, BUN, Creatinine  Criteria: labs WNR              Time Spent (min): 45 minutes

## 2025-02-28 NOTE — PROGRESS NOTES
Physical Therapy    Physical Therapy Evaluation    Patient Name: Wisam Austin  MRN: [id number]  Today's Date: 2/28/2025   Time Calculation  Start Time: 1016  Stop Time: 1034  Time Calculation (min): 18 min  2105/2105-A    Assessment/Plan   PT Assessment: Pt appears near/at baseline level of function.  Pt able to ambulate around room with SBA (2/2 lines/tubes).  Pt denies any concerns with returning home upon DC from the hospital.  Will DC PT orders at this time.    Evaluation/Treatment Tolerance: Patient tolerated treatment well  Medical Staff Made Aware: Yes  End of Session Communication: Bedside nurse  End of Session Patient Position: Up in chair, Alarm on  IP OR SWING BED PT PLAN  Inpatient or Swing Bed: Inpatient  PT Plan  PT Plan: PT Eval only  PT Frequency: PT eval only  PT Discharge Recommendations: No further acute PT, No PT needed after discharge  PT Recommended Transfer Status:  (SUP)  PT - OK to Discharge: Yes - To next level of care when cleared by medical team    Subjective     Current Problem:  1. MOJGAN (acute kidney injury) (CMS-[facility])        2. Uremia            Past Medical History:  Patient Active Problem List   Diagnosis    Penile lesion    HTN (hypertension)    Hyperlipidemia    Class 2 obesity with body mass index (BMI) of 38.0 to 38.9 in adult    MOJGAN (acute kidney injury) (CMS-[facility])    UTI (urinary tract infection)    Neutrophilic leukocytosis    Hypermagnesemia    Hypercalcemia    Elevated troponin    Enlarged prostate       General Visit Information:  Per EMR: pt presents from PCP for low blood pressure in office without significant symptoms. I reviewed urology note from Dr. Storey, patient is status post circumcision partial penectomy on 1/8/2025, in office today for a wound check, his wound was well-healed per him, did not feel like this was related to his low blood pressure.  Patient was found to have a blood pressure in the 80s/60s and was sent to the ER for further evaluation.  This blood  pressure was duplicated in triage. I was informed by the triage nurse that the patient was requesting to leave. I evaluated the patient in triage.  We informed him that his troponin was elevated and informed him of the risks of leaving AMA. Patient ultimately agreed to stay for further evaluation when we discussed his high initial troponin. Patient otherwise denying lightheadedness, headache, fevers, sweats, chills, visual changes, chest pain, palpitations, shortness of breath, nausea, vomiting, dysuria, hematuria, black or bloody stools.     On arrival, pt supine in bed.  Pt in no apparent distress and agreeable to therapy.    General  Reason for Referral: impaired mobility  Referred By: Jose Velazco  Prior to Session Communication: Bedside nurse  Patient Position Received: Bed, 3 rail up, Alarm on    Home Living/PLOF:  Pt lives alone in house with 0 ANNE.  1 floor set up.  Has WIS with Gbs.  IND with mobility and ADLs.  Pt states he uses FWW when he walks long distances at the park.  Pt drives nd does own grocery shopping. Denies any falls.     Precautions:  Precautions  Medical Precautions: Fall precautions     Objective     Pain:  Pain Assessment  Pain Assessment: 0-10  0-10 (Numeric) Pain Score:  (pt reports 10/10 LBP (from laying in bed) and 10/10 penile pain at catheter insertion)  Pain Interventions: Repositioned    Cognition:  Cognition  Overall Cognitive Status: Within Functional Limits  Orientation Level: Oriented X4    General Assessments:      Activity Tolerance  Endurance: Endurance does not limit participation in activity    Static Sitting Balance  Static Sitting-Comment/Number of Minutes: good  Dynamic Sitting Balance  Dynamic Sitting-Comments: good  Static Standing Balance  Static Standing-Comment/Number of Minutes: good  Dynamic Standing Balance  Dynamic Standing-Comments: good    Extremity/Trunk Assessments:  BLE strength: grossly WFL    Functional Mobility:  Bed mobility  Supine to sit:  SUP    Transfers  Sit to stand: SUP  Stand to sit: SUP    Ambulation/Stairs  Pt ambulated 20 ft with SBA without device; pt grossly steady without LOB, only requiring SUP 2/2 lines and tubes     Outcome Measures:  [facility] Basic Mobility  Turning from your back to your side while in a flat bed without using bedrails: None  Moving from lying on your back to sitting on the side of a flat bed without using bedrails: None  Moving to and from bed to chair (including a wheelchair): None  Standing up from a chair using your arms (e.g. wheelchair or bedside chair): None  To walk in hospital room: None  Climbing 3-5 steps with railing: None  Basic Mobility - Total Score: 24    Education Documentation  Body Mechanics, taught by Korin Hernandez PT at 2/28/2025  2:08 PM.  Learner: Patient  Readiness: Acceptance  Method: Explanation  Response: Verbalizes Understanding    Home Exercise Program, taught by Korin Hernandez PT at 2/28/2025  2:08 PM.  Learner: Patient  Readiness: Acceptance  Method: Explanation  Response: Verbalizes Understanding    Mobility Training, taught by Korin Hernandez PT at 2/28/2025  2:08 PM.  Learner: Patient  Readiness: Acceptance  Method: Explanation  Response: Verbalizes Understanding    Education Comments  No comments found.

## 2025-02-28 NOTE — H&P
Internal Medicine History and Physical    PATIENT NAME:  Wisam Austin    MRN: [id number]  DATE of SERVICE: 2025    Primary Care Physician: No Assigned PCP Generic Provider, MD          Chief Complaint:  MOJGAN    HPI:  This is a 75 y.o. male who was sent from his urologist Dr. Storey after he was found to be hypotensive, in the emergency room he was found to be in severe acute kidney injury with serum creatinine at 7 and elevated BUN at 134, in the emergency room patient had chest x-ray not show any acute process, UA showed signs of UTI, abdominal CT scan showed enlarged prostate, patient had Kiran catheter placed start IV fluids and get admitted for further evaluation and treatment.    Past Medical History:  Past Medical History:   Diagnosis Date    Cataract     Colon polyps     COVID-19     VACCINATED    Erectile dysfunction     Glaucoma     Hyperlipidemia     Hypertension     Myocardial infarction (Multi)     Penile lesion     Wears glasses        Past Surgical History:  Past Surgical History:   Procedure Laterality Date    CATARACT EXTRACTION      CIRCUMCISION, NON-      COLONOSCOPY      OTHER SURGICAL HISTORY  2025    Partial penectomy    TRABECULECTOMY         Family History:  Family History   Problem Relation Name Age of Onset    Cataracts Father      Glaucoma Father         Social History:    Social History     Socioeconomic History    Marital status:      Spouse name: Not on file    Number of children: Not on file    Years of education: Not on file    Highest education level: Not on file   Occupational History    Not on file   Tobacco Use    Smoking status: Former     Types: Cigarettes    Smokeless tobacco: Never   Vaping Use    Vaping status: Never Used   Substance and Sexual Activity    Alcohol use: Not Currently    Drug use: Not Currently    Sexual activity: Defer   Other Topics Concern    Not on file   Social History Narrative    Not on file     Social Drivers of Health      Financial Resource Strain: Low Risk  (2/27/2025)    Overall Financial Resource Strain (CARDIA)     Difficulty of Paying Living Expenses: Not hard at all   Food Insecurity: No Food Insecurity (2/27/2025)    Hunger Vital Sign     Worried About Running Out of Food in the Last Year: Never true     Ran Out of Food in the Last Year: Never true   Transportation Needs: No Transportation Needs (2/27/2025)    PRAPARE - Transportation     Lack of Transportation (Medical): No     Lack of Transportation (Non-Medical): No   Physical Activity: Sufficiently Active (8/26/2024)    Received from [facility]    Exercise Vital Sign     Days of Exercise per Week: 3 days     Minutes of Exercise per Session: 90 min   Stress: Not on file   Social Connections: Not on file   Intimate Partner Violence: Not At Risk (2/27/2025)    Humiliation, Afraid, Rape, and Kick questionnaire     Fear of Current or Ex-Partner: No     Emotionally Abused: No     Physically Abused: No     Sexually Abused: No   Housing Stability: Low Risk  (2/27/2025)    Housing Stability Vital Sign     Unable to Pay for Housing in the Last Year: No     Number of Times Moved in the Last Year: 1     Homeless in the Last Year: No         Prior to Admission Medications:  Medications Prior to Admission   Medication Sig Dispense Refill Last Dose/Taking    amLODIPine (Norvasc) 10 mg tablet Take 1 tablet (10 mg) by mouth once daily.   2/27/2025    calcium citrate (Calcitrate) 200 mg (950 mg) tablet Take 1 tablet (200 mg) by mouth once daily.   2/27/2025    dorzolamide-timoloL (Cosopt) 22.3-6.8 mg/mL ophthalmic solution Administer 1 drop into both eyes 2 times a day.   2/26/2025    hydroCHLOROthiazide (Microzide) 12.5 mg tablet Take 1 tablet (12.5 mg) by mouth once daily.   2/27/2025    latanoprost (Xalatan) 0.005 % ophthalmic solution Administer 1 drop into both eyes once daily at bedtime.   2/26/2025    lisinopril 20 mg tablet Take 1 tablet (20 mg) by mouth  once daily.   2/27/2025    multivitamin tablet Take 1 tablet by mouth once daily.   2/27/2025    simvastatin (Zocor) 20 mg tablet Take 1 tablet (20 mg) by mouth once daily at bedtime.   2/26/2025    bacitracin 500 unit/gram ointment Apply topically 2 times a day. (Patient not taking: Reported on 2/27/2025) 30 g 1 Not Taking    sildenafil (Viagra) 100 mg tablet Take 1 tablet (100 mg) by mouth if needed for erectile dysfunction.   Unknown       Active orders:  Active Orders   Lab    Basic metabolic panel     Frequency: AM draw     Number of Occurrences: Until Specified    CBC     Frequency: AM draw     Number of Occurrences: Until Specified    Magnesium     Frequency: AM draw     Number of Occurrences: Until Specified   Diet    NPO Diet Except: Sips with meds; Effective midnight     Frequency: Effective midnight     Number of Occurrences: Until Specified   Nursing    Activity (specify) Out of Bed with Assistance     Frequency: Until discontinued     Number of Occurrences: Until Specified    Apply sequential compression device     Frequency: Until discontinued     Number of Occurrences: Until Specified    Maintain Urethral Catheter and Do Not remove Urethral Catheter without Provider Order     Frequency: Until discontinued     Number of Occurrences: Until Specified    Notify provider     Frequency: Until discontinued     Number of Occurrences: Until Specified    Nursing Guidelines: Marvin Cath     Frequency: Until discontinued     Number of Occurrences: Until Specified     Order Comments: 1. Perform a Post Voiding Residual (PVR) using the Bladder Volume Control Indicator (BVI) the first two voidings after a marvin catheter is removed.  If the PVR is greater than 450cc perform straight cath.  If the patient requires a second straight cath procedure notify physician on the next two rounds.  2. If the patient has not voided after 8 hours check volume with the BVI and straight cath if over 450cc.  3. If no voiding after 24  hours reinsert marvin  4. Notify physician of marvin replacement during next physician rounds      Pain Assessment     Frequency: PRN     Number of Occurrences: Until Specified    Physician Communication     Frequency: Until discontinued     Number of Occurrences: Until Specified     Order Comments: IF patient requires a second straight cath procedure after marvin removed, notify physician on the next rounds      Straight cath     Frequency: PRN     Number of Occurrences: Until Specified     Order Comments: As needed if PVR after marvin removed is greater than 450 mL.  Notify physician on the next rounds if patient requires a second straight cath procedure after marvin removed.      Strict intake and output     Frequency: q shift     Number of Occurrences: Until Specified    Vital Signs     Frequency: q4h     Number of Occurrences: Until Specified    Weigh patient     Frequency: Daily     Number of Occurrences: Until Specified   Code Status    Full code     Frequency: Continuous     Number of Occurrences: Until Specified   Consult    Inpatient consult to Dietitian     Frequency: Once     Number of Occurrences: 1 Occurrences     Order Comments: Nursing Admission Screening      Inpatient consult to Nephrology     Frequency: Once     Number of Occurrences: 1 Occurrences    Inpatient consult to Psychiatry     Frequency: Once     Number of Occurrences: 1 Occurrences    Inpatient consult to Urology     Frequency: Once     Number of Occurrences: 1 Occurrences   Nourishments    May Participate in Room Service     Frequency: Once     Number of Occurrences: 1 Occurrences   OT    OT eval and treat     Frequency: Until therapy completed     Number of Occurrences: 1 Occurrences   PT    PT eval and treat     Frequency: Until therapy completed     Number of Occurrences: 1 Occurrences   Respiratory Care    Pulse oximetry, continuous     Frequency: Continuous     Number of Occurrences: Until Specified   ECG    ECG 12 lead     Frequency:  q1h     Number of Occurrences: 2 Occurrences    Electrocardiogram, 12-lead PRN ACS symptoms     Frequency: PRN     Number of Occurrences: Until Specified     Order Comments: Notify provider if performed.     Precaution    Aspiration precautions     Frequency: Until discontinued     Number of Occurrences: Until Specified    Fall precautions     Frequency: Until discontinued     Number of Occurrences: Until Specified   Telemetry    Telemetry monitoring - Floor only     Frequency: Until discontinued     Number of Occurrences: 3 Days   Medications    acetaminophen (Tylenol) oral liquid 650 mg     Linked Order: Or     Frequency: q6h PRN     Dose: 650 mg     Route: oral    acetaminophen (Tylenol) suppository 650 mg     Linked Order: Or     Frequency: q6h PRN     Dose: 650 mg     Route: rectal    acetaminophen (Tylenol) tablet 650 mg     Linked Order: Or     Frequency: q6h PRN     Dose: 650 mg     Route: oral    amLODIPine (Norvasc) tablet 10 mg     Frequency: Daily     Dose: 10 mg     Route: oral    calcium citrate (Calcitrate) tablet 200 mg     Frequency: Daily     Dose: 200 mg     Route: oral    cefTRIAXone (Rocephin) 1 g in sodium chloride 0.9% IV 50 mL     Frequency: q24h     Dose: 1 g     Route: intravenous    dorzolamide-timoloL (Cosopt) 22.3-6.8 mg/mL ophthalmic solution 1 drop     Frequency: BID     Dose: 1 drop     Route: Both Eyes    heparin (porcine) injection 5,000 Units     Frequency: q8h     Dose: 5,000 Units     Route: subcutaneous    hydroCHLOROthiazide (Microzide) tablet 12.5 mg     Frequency: Daily     Dose: 12.5 mg     Route: oral    latanoprost (Xalatan) 0.005 % ophthalmic solution 1 drop     Frequency: Nightly     Dose: 1 drop     Route: Both Eyes    lisinopril tablet 20 mg     Frequency: Daily     Dose: 20 mg     Route: oral    melatonin tablet 3 mg     Frequency: Nightly PRN     Dose: 3 mg     Route: oral    polyethylene glycol (Glycolax, Miralax) packet 17 g     Frequency: Daily PRN     Dose: 17 g  "    Route: oral    simvastatin (Zocor) tablet 20 mg     Frequency: Nightly     Dose: 20 mg     Route: oral    sodium bicarbonate 1 mEq/mL (8.4 %) 150 mEq in dextrose 5% 1,150 mL infusion     Frequency: Continuous     Dose: 100 mL/hr     Route: intravenous    sodium chloride 0.9% infusion     Frequency: Continuous     Dose: 100 mL/hr     Route: intravenous           Allergies:   Allergies   Allergen Reactions    Apraclonidine Other     Other reaction(s): Other: See Comments -  Irritation, not tolerable    Brimonidine Other     Other reaction(s): Other: See Comments -  Follicles       Review of Systems:  A 10 systems review of systems is negative except for HPI.      Vitals:  Patient Vitals for the past 24 hrs:   BP Temp Temp src Pulse Resp SpO2 Height Weight   02/28/25 0827 97/56 -- -- 73 -- -- -- --   02/28/25 0800 -- -- -- 66 17 -- -- --   02/28/25 0400 -- -- -- 68 16 -- -- --   02/28/25 0325 100/57 36.6 °C (97.9 °F) -- 80 18 98 % -- --   02/28/25 0000 -- -- -- 80 16 -- -- --   02/27/25 2335 128/59 36.8 °C (98.2 °F) -- 84 24 96 % -- --   02/27/25 2255 126/65 36.8 °C (98.2 °F) Temporal 88 19 100 % 1.676 m (5' 6\") 92.4 kg (203 lb 11.3 oz)   02/27/25 2239 116/69 -- -- 78 18 98 % -- --   02/27/25 2137 113/60 -- -- 74 18 98 % -- --   02/27/25 2100 -- -- -- -- -- 98 % -- --   02/27/25 1924 101/58 -- -- 96 18 -- -- --   02/27/25 1923 100/55 -- -- 86 18 -- -- --   02/27/25 1905 111/55 -- -- 70 18 99 % -- --   02/27/25 1851 83/50 -- -- 68 20 99 % -- --   02/27/25 1831 86/58 -- -- 72 (!) 22 94 % -- --   02/27/25 1755 85/55 -- -- 71 (!) 24 99 % -- --   02/27/25 1745 89/62 36.4 °C (97.5 °F) Tympanic 70 (!) 24 99 % -- --   02/27/25 1655 84/59 36 °C (96.8 °F) -- 73 18 100 % -- --   02/27/25 1534 -- -- -- -- -- 100 % -- --   02/27/25 1524 98/57 36.2 °C (97.2 °F) -- 71 16 96 % 1.676 m (5' 6\") 91.2 kg (201 lb)     Body mass index is 32.88 kg/m².         Physical Exam:  General appearance: Well-appearing alert, in no acute distress "   Skin: Skin color, texture, turgor normal, no suspicious rashes or lesions  Head: normal  Eyes: Anicteric sclera. Extraocular movements are intact.   Ears: external ears normal  Nose/Sinuses: Negative  Oropharynx: Lips, mucosa, and tongue normal  Neck: Supple, no adenopathy; thyroid symmetric, normal size, no bruits  Lungs: Clear to auscultation, no wheezing or rhonchi  Heart: RRR without murmur, gallop, or rubs.  No ectopy  Abdomen: Soft, non-tender. Bowel sounds normal. No masses, organomegaly  Extremities: No deformity, no edema  Neuro: Awake, cooperative      Labs:  Results for orders placed or performed during the hospital encounter of 02/27/25 (from the past 24 hours)   CBC with Differential   Result Value Ref Range    WBC 12.7 (H) 4.4 - 11.3 x10*3/uL    nRBC 0.0 0.0 - 0.0 /100 WBCs    RBC 3.53 (L) 4.50 - 5.90 x10*6/uL    Hemoglobin 10.2 (L) 13.5 - 17.5 g/dL    Hematocrit 34.5 (L) 41.0 - 52.0 %    MCV 98 80 - 100 fL    MCH 28.9 26.0 - 34.0 pg    MCHC 29.6 (L) 32.0 - 36.0 g/dL    RDW 12.4 11.5 - 14.5 %    Platelets 356 150 - 450 x10*3/uL    Neutrophils % 71.1 40.0 - 80.0 %    Immature Granulocytes %, Automated 0.5 0.0 - 0.9 %    Lymphocytes % 18.5 13.0 - 44.0 %    Monocytes % 7.3 2.0 - 10.0 %    Eosinophils % 1.8 0.0 - 6.0 %    Basophils % 0.8 0.0 - 2.0 %    Neutrophils Absolute 9.03 (H) 1.60 - 5.50 x10*3/uL    Immature Granulocytes Absolute, Automated 0.06 0.00 - 0.50 x10*3/uL    Lymphocytes Absolute 2.34 0.80 - 3.00 x10*3/uL    Monocytes Absolute 0.92 (H) 0.05 - 0.80 x10*3/uL    Eosinophils Absolute 0.23 0.00 - 0.40 x10*3/uL    Basophils Absolute 0.10 0.00 - 0.10 x10*3/uL   Troponin I, High Sensitivity, Initial   Result Value Ref Range    Troponin I, High Sensitivity 51 (HH) 0 - 20 ng/L   B-type natriuretic peptide   Result Value Ref Range    BNP 54 0 - 99 pg/mL   Comprehensive Metabolic Panel   Result Value Ref Range    Glucose 121 (H) 74 - 99 mg/dL    Sodium 134 (L) 136 - 145 mmol/L    Potassium 5.3 3.5 -  5.3 mmol/L    Chloride 97 (L) 98 - 107 mmol/L    Bicarbonate 21 21 - 32 mmol/L    Anion Gap 21 (H) 10 - 20 mmol/L    Urea Nitrogen 134 (HH) 6 - 23 mg/dL    Creatinine 7.86 (H) 0.50 - 1.30 mg/dL    eGFR 7 (L) >60 mL/min/1.73m*2    Calcium 10.9 (H) 8.6 - 10.3 mg/dL    Albumin 4.0 3.4 - 5.0 g/dL    Alkaline Phosphatase 64 33 - 136 U/L    Total Protein 8.4 (H) 6.4 - 8.2 g/dL    AST 15 9 - 39 U/L    Bilirubin, Total 0.8 0.0 - 1.2 mg/dL    ALT 10 10 - 52 U/L   Protime-INR   Result Value Ref Range    Protime 13.5 (H) 9.8 - 12.4 seconds    INR 1.2 (H) 0.9 - 1.1   Troponin, High Sensitivity, 1 Hour   Result Value Ref Range    Troponin I, High Sensitivity 63 (HH) 0 - 20 ng/L   Magnesium   Result Value Ref Range    Magnesium 3.07 (H) 1.60 - 2.40 mg/dL   Urinalysis with Reflex Culture and Microscopic   Result Value Ref Range    Color, Urine Yellow Light-Yellow, Yellow, Dark-Yellow    Appearance, Urine Turbid (N) Clear    Specific Gravity, Urine 1.019 1.005 - 1.035    pH, Urine 5.0 5.0, 5.5, 6.0, 6.5, 7.0, 7.5, 8.0    Protein, Urine 30 (1+) (A) NEGATIVE, 10 (TRACE), 20 (TRACE) mg/dL    Glucose, Urine Normal Normal mg/dL    Blood, Urine NEGATIVE NEGATIVE mg/dL    Ketones, Urine TRACE (A) NEGATIVE mg/dL    Bilirubin, Urine 1 (1+) (A) NEGATIVE mg/dL    Urobilinogen, Urine 4 (2+) (A) Normal mg/dL    Nitrite, Urine NEGATIVE NEGATIVE    Leukocyte Esterase, Urine 250 Bnidu/uL (A) NEGATIVE   Extra Urine Gray Tube   Result Value Ref Range    Extra Tube Hold for add-ons.    Microscopic Only, Urine   Result Value Ref Range    WBC, Urine 21-50 (A) 1-5, NONE /HPF    RBC, Urine 3-5 NONE, 1-2, 3-5 /HPF    Squamous Epithelial Cells, Urine 1-9 (SPARSE) Reference range not established. /HPF    Bacteria, Urine 1+ (A) NONE SEEN /HPF    Mucus, Urine FEW Reference range not established. /LPF    Hyaline Casts, Urine 4+ (A) NONE /LPF    Fine Granular Casts, Urine 1+ (A) NONE /LPF   Basic metabolic panel   Result Value Ref Range    Glucose 175 (H) 74 - 99  "mg/dL    Sodium 135 (L) 136 - 145 mmol/L    Potassium 4.1 3.5 - 5.3 mmol/L    Chloride 100 98 - 107 mmol/L    Bicarbonate 21 21 - 32 mmol/L    Anion Gap 18 10 - 20 mmol/L    Urea Nitrogen 116 (HH) 6 - 23 mg/dL    Creatinine 5.62 (H) 0.50 - 1.30 mg/dL    eGFR 10 (L) >60 mL/min/1.73m*2    Calcium 9.9 8.6 - 10.3 mg/dL   CBC   Result Value Ref Range    WBC 8.8 4.4 - 11.3 x10*3/uL    nRBC 0.0 0.0 - 0.0 /100 WBCs    RBC 3.10 (L) 4.50 - 5.90 x10*6/uL    Hemoglobin 8.9 (L) 13.5 - 17.5 g/dL    Hematocrit 29.0 (L) 41.0 - 52.0 %    MCV 94 80 - 100 fL    MCH 28.7 26.0 - 34.0 pg    MCHC 30.7 (L) 32.0 - 36.0 g/dL    RDW 12.4 11.5 - 14.5 %    Platelets 283 150 - 450 x10*3/uL   Magnesium   Result Value Ref Range    Magnesium 2.74 (H) 1.60 - 2.40 mg/dL   Troponin I, High Sensitivity   Result Value Ref Range    Troponin I, High Sensitivity 41 (H) 0 - 20 ng/L         Imaging:   Reviewed          Assessment/Plan:  Assessment & Plan  MOJGAN (acute kidney injury) (CMS-[facility])  Admit patient to the medical floor with telemetry  Patient has Kiran catheter  Kidney function improving, serum creatinine 5  Nephrology is following  UTI (urinary tract infection)  Continue Rocephin    Hypermagnesemia  Continue monitoring  Hypercalcemia  Monitor BMP  Elevated troponin  Most likely related to kidney dysfunction  Will monitor  Enlarged prostate  Possibly causing urinary retention  Patient has Kiran catheter  Urology consult      DVT Prophylaxis- Addressed    Discussed with patient, RN    SIGNATURE: Joes Velazco MD  DATE: February 28, 2025  TIME: 8:31 AM      This note was partially created using voice recognition software and is inherently subject to errors including those of syntax and \"sound-alike\" substitutions which may escape proofreading. In such instances, original meaning may be extrapolated by contextual derivation    "

## 2025-02-28 NOTE — H&P
"History Of Present Illness  Wisam Austin is a 75 y.o. 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    Patient states he was at a wound check appointment with his urologist Dr. Storey this afternoon where he was noted to have low blood pressure, hence was asked to come to the ER for further evaluation. He reports that he has been feeling well since his surgery and had no concerns up until he was asked to come in today.  In the ER patient was notified of results of his blood work concerning for significant MOJGAN, possible UTI, and elevated troponin, in addition to being hypotensive which was later resolved with administration of IV.  He tells me at that time he wanted to leave AMA because he did not believe anything was wrong, but ultimately did agree to admission in order to speak with urology and nephrology. Currently, patient states he feels depressed with his whole situation because this is a major set back and he is not sure what to do. He denies SI/HI. His only other complaint at the moment is discomfort associated with the marvin catheter. He denies recent fever, chills, dizziness, lightheadedness, headache, chest pain, palpitations, shortness of breath, cough, abdominal pain, nausea, vomiting, diarrhea, dysuria, or hematuria.    Of note, patient was deemed NOT to have capacity by the ER physician as he made a comment of \"he wants to go home and if it means he dies, then he wants to go home and die\". He is still unable to completely understand the situation in terms of his kidney function and disease process.    ED Course:  Vital signs: Temp. 97.2 F, HR 71 bpm, RR 16, BP 98/57, SPO2 96% on room air   CMP: Glucose 121, Na+ 134, K+ 5.3, Cl- 97, bicarb 21, anion gap 21, , Cr 7.86, EGFR 7, calcium 10.9, total protein " 8.4  Magnesium: 3.07  BNP: 54  Troponin: 51, 63; elevation may be secondary to hypotension. Low suspicion for ACS.  CBC w/diff: WBC 12.7, H&H 10.2/34.5, MCHC 29.6, neutrophils 9.03  Blood cultures x 2 currently in process  UA: +1 protein, trace ketones, +1 bilirubin, +2 urobilinogen, 250 leukocyte esterase, 21-50 WBC, +1 bacteria, +4 hyaline casts, +1 fine granular casts  -Suggestive of UTI, micro positive for granular casts and hyaline casts with findings suggesting more of ATN  Urine culture in process  CXR: No localizing infiltrate.  Trace fluid in right fissure.  CT abdomen pelvis: Enlarged and heterogenous prostate gland with 3.4 x 2.2 cm low-density focus in right aspect of prostate gland.  Malignancy versus infection on the differential.  No hydronephrosis.  No CT findings of diverticulitis or colitis.  Appendix normal.  Coronary artery calcification.  EKG: Normal sinus rhythm, ventricular rate 72 bpm,  ms, QRS 86 ms, QTc 411 ms. No ST elevation or depression noted.  Medications Given: IV Rocephin, 0.9% sodium chloride 2 L IV fluids  Disposition: Patient admitted for MOJGAN with uremia, hypotension, UTI, and elevated troponin.     Past Medical History  Past Medical History:   Diagnosis Date    Cataract     Colon polyps     COVID-19     VACCINATED    Erectile dysfunction     Glaucoma     Hyperlipidemia     Hypertension     Penile lesion     Wears glasses        Surgical History  Past Surgical History:   Procedure Laterality Date    CATARACT EXTRACTION      CIRCUMCISION, NON-      COLONOSCOPY      OTHER SURGICAL HISTORY  2025    Partial penectomy    TRABECULECTOMY        Social History  He reports that he has quit smoking. His smoking use included cigarettes. He has never used smokeless tobacco. He reports that he does not currently use alcohol. He reports that he does not currently use drugs.    Family History  Family History   Problem Relation Name Age of Onset    Cataracts Father      Glaucoma  Father          Allergies  Apraclonidine and Brimonidine    Review of Systems     Constitutional:  Negative for chills, fatigue and fever.   HENT:  Negative for hearing loss, sore throat and trouble swallowing.    Eyes:  Negative for visual disturbance.   Respiratory:  Negative for cough, chest tightness and shortness of breath.    Cardiovascular:  Negative for chest pain and palpitations.   Gastrointestinal:  Negative for abdominal distention, abdominal pain, constipation and diarrhea.   Genitourinary:  Positive for penile pain (Associated with placement of Kiran catheter). Negative for dysuria, flank pain, frequency, hematuria and urgency.   Musculoskeletal:  Negative for back pain and gait problem.   Skin:  Negative for color change, rash and wound.   Neurological:  Negative for dizziness, syncope, weakness and light-headedness.   Psychiatric/Behavioral:  Positive for dysphoric mood. Negative for agitation and confusion. The patient is not nervous/anxious.         Physical Exam  Vitals reviewed.   Constitutional:       General: He is not in acute distress.  HENT:      Head: Normocephalic and atraumatic.      Right Ear: External ear normal.      Left Ear: External ear normal.      Nose: Nose normal.      Mouth/Throat:      Mouth: Mucous membranes are dry.   Eyes:      General: Lids are normal. Vision grossly intact.      Pupils: Pupils are equal, round, and reactive to light.   Cardiovascular:      Rate and Rhythm: Normal rate and regular rhythm.      Pulses:           Radial pulses are 2+ on the right side and 2+ on the left side.        Dorsalis pedis pulses are 1+ on the right side and 1+ on the left side.      Heart sounds: Normal heart sounds, S1 normal and S2 normal. No murmur heard.  Pulmonary:      Effort: Pulmonary effort is normal. No respiratory distress.      Breath sounds: Normal breath sounds. No wheezing, rhonchi or rales.   Abdominal:      General: Bowel sounds are normal. There is no distension.  "     Palpations: Abdomen is soft.      Tenderness: There is no abdominal tenderness.   Genitourinary:     Comments: Kiran catheter in place draining clear yellow urine  Musculoskeletal:      Right lower leg: No edema.      Left lower leg: No edema.   Skin:     General: Skin is warm and dry.      Capillary Refill: Capillary refill takes less than 2 seconds.   Neurological:      Mental Status: He is alert and oriented to person, place, and time. Mental status is at baseline.      Sensory: Sensation is intact.      Motor: Motor function is intact.   Psychiatric:         Attention and Perception: Attention normal.         Mood and Affect: Mood is depressed.         Speech: Speech normal.         Behavior: Behavior is cooperative.         Thought Content: Thought content normal. Thought content does not include suicidal ideation. Thought content does not include suicidal plan.       Last Recorded Vitals  Blood pressure 100/57, pulse 80, temperature 36.6 °C (97.9 °F), resp. rate 18, height 1.676 m (5' 6\"), weight 92.4 kg (203 lb 11.3 oz), SpO2 98%.    Pain Assessment: 0-10  0-10 (Numeric) Pain Score: 10 - Worst possible pain  Pain Location: Penis    Relevant Results  Results for orders placed or performed during the hospital encounter of 02/27/25 (from the past 24 hours)   CBC with Differential   Result Value Ref Range    WBC 12.7 (H) 4.4 - 11.3 x10*3/uL    nRBC 0.0 0.0 - 0.0 /100 WBCs    RBC 3.53 (L) 4.50 - 5.90 x10*6/uL    Hemoglobin 10.2 (L) 13.5 - 17.5 g/dL    Hematocrit 34.5 (L) 41.0 - 52.0 %    MCV 98 80 - 100 fL    MCH 28.9 26.0 - 34.0 pg    MCHC 29.6 (L) 32.0 - 36.0 g/dL    RDW 12.4 11.5 - 14.5 %    Platelets 356 150 - 450 x10*3/uL    Neutrophils % 71.1 40.0 - 80.0 %    Immature Granulocytes %, Automated 0.5 0.0 - 0.9 %    Lymphocytes % 18.5 13.0 - 44.0 %    Monocytes % 7.3 2.0 - 10.0 %    Eosinophils % 1.8 0.0 - 6.0 %    Basophils % 0.8 0.0 - 2.0 %    Neutrophils Absolute 9.03 (H) 1.60 - 5.50 x10*3/uL    Immature " Granulocytes Absolute, Automated 0.06 0.00 - 0.50 x10*3/uL    Lymphocytes Absolute 2.34 0.80 - 3.00 x10*3/uL    Monocytes Absolute 0.92 (H) 0.05 - 0.80 x10*3/uL    Eosinophils Absolute 0.23 0.00 - 0.40 x10*3/uL    Basophils Absolute 0.10 0.00 - 0.10 x10*3/uL   Troponin I, High Sensitivity, Initial   Result Value Ref Range    Troponin I, High Sensitivity 51 (HH) 0 - 20 ng/L   B-type natriuretic peptide   Result Value Ref Range    BNP 54 0 - 99 pg/mL   Comprehensive Metabolic Panel   Result Value Ref Range    Glucose 121 (H) 74 - 99 mg/dL    Sodium 134 (L) 136 - 145 mmol/L    Potassium 5.3 3.5 - 5.3 mmol/L    Chloride 97 (L) 98 - 107 mmol/L    Bicarbonate 21 21 - 32 mmol/L    Anion Gap 21 (H) 10 - 20 mmol/L    Urea Nitrogen 134 (HH) 6 - 23 mg/dL    Creatinine 7.86 (H) 0.50 - 1.30 mg/dL    eGFR 7 (L) >60 mL/min/1.73m*2    Calcium 10.9 (H) 8.6 - 10.3 mg/dL    Albumin 4.0 3.4 - 5.0 g/dL    Alkaline Phosphatase 64 33 - 136 U/L    Total Protein 8.4 (H) 6.4 - 8.2 g/dL    AST 15 9 - 39 U/L    Bilirubin, Total 0.8 0.0 - 1.2 mg/dL    ALT 10 10 - 52 U/L   Protime-INR   Result Value Ref Range    Protime 13.5 (H) 9.8 - 12.4 seconds    INR 1.2 (H) 0.9 - 1.1   Troponin, High Sensitivity, 1 Hour   Result Value Ref Range    Troponin I, High Sensitivity 63 (HH) 0 - 20 ng/L   Magnesium   Result Value Ref Range    Magnesium 3.07 (H) 1.60 - 2.40 mg/dL   Urinalysis with Reflex Culture and Microscopic   Result Value Ref Range    Color, Urine Yellow Light-Yellow, Yellow, Dark-Yellow    Appearance, Urine Turbid (N) Clear    Specific Gravity, Urine 1.019 1.005 - 1.035    pH, Urine 5.0 5.0, 5.5, 6.0, 6.5, 7.0, 7.5, 8.0    Protein, Urine 30 (1+) (A) NEGATIVE, 10 (TRACE), 20 (TRACE) mg/dL    Glucose, Urine Normal Normal mg/dL    Blood, Urine NEGATIVE NEGATIVE mg/dL    Ketones, Urine TRACE (A) NEGATIVE mg/dL    Bilirubin, Urine 1 (1+) (A) NEGATIVE mg/dL    Urobilinogen, Urine 4 (2+) (A) Normal mg/dL    Nitrite, Urine NEGATIVE NEGATIVE     Leukocyte Esterase, Urine 250 Bindu/uL (A) NEGATIVE   Microscopic Only, Urine   Result Value Ref Range    WBC, Urine 21-50 (A) 1-5, NONE /HPF    RBC, Urine 3-5 NONE, 1-2, 3-5 /HPF    Squamous Epithelial Cells, Urine 1-9 (SPARSE) Reference range not established. /HPF    Bacteria, Urine 1+ (A) NONE SEEN /HPF    Mucus, Urine FEW Reference range not established. /LPF    Hyaline Casts, Urine 4+ (A) NONE /LPF    Fine Granular Casts, Urine 1+ (A) NONE /LPF      CT abdomen pelvis wo IV contrast    Result Date: 2/27/2025  STUDY: CT Abdomen and Pelvis without IV Contrast; 2/27/2025 at 9:54 PM. INDICATION: Severe MOJGAN, decreased urine output. COMPARISON: None available. ACCESSION NUMBER(S): [id number] ORDERING CLINICIAN: ADONIS MCKEON TECHNIQUE: CT of the abdomen and pelvis was performed.  Contiguous axial images were obtained at 3 mm slice thickness through the abdomen and pelvis. Coronal and sagittal reconstructions at 3 mm slice thickness were performed. No intravenous contrast was administered.  Automated mA/kV exposure control was utilized and patient examination was performed in strict accordance with principles of ALARA. FINDINGS: PLEASE NOTE THAT IN THE ABSENCE OF IV CONTRAST, SENSITIVITY FOR DETECTING VISCERAL AND VASCULAR ABNORMALITIES AS WELL AS INFLAMMATORY AND NEOPLASTIC CONDITIONS IS REDUCED. Imaged lower chest: No acute finding. There is coronary artery calcification, a marker for coronary atherosclerotic disease. Liver/gallbladder: Normal hepatic morphology. No suspicious space-occupying hepatic lesion. Gallbladder contains layering hyperdense material which is probably sludge. Adrenal: No adrenal nodule. Renal: No hydronephrosis. No suspicious renal lesion. No renal or ureteral calculus. Spleen: Normal in size. Pancreas: Unremarkable without surrounding infiltration or fluid. Vascular: Aorta and common iliac arteries normal in caliber. Urinary bladder: Decompressed. Reproductive organs: Enlarged and heterogeneous  prostate gland measures 7.1 cm in transverse dimension. 3.4 x 2.2 cm low-density focus in the right aspect of the prostate gland image 142, series 201 is nonspecific. GI: No CT finding to suggest acute diverticulitis or colitis. No dilated small bowel loops. Appendix normal. Other: No drainable fluid collection. No free air. Musculoskeletal: No suspicious osseous lesion. No acute fracture identified.    *Enlarged and heterogeneous prostate gland with 3.4 x 2.2 cm low-density focus in the right aspect of the prostate gland is nonspecific. Malignancy and infection would be on the differential. Correlate with PSA and WBC levels. *No hydronephrosis. No renal or ureteral calculus. *No CT finding to suggest acute diverticulitis or colitis. Appendix normal. *Coronary artery calcification, a marker for coronary atherosclerotic disease. Signed by Ladarius Bone MD    XR chest 1 view    Result Date: 2/27/2025  Interpreted By:  Bassam Leon, STUDY: XR CHEST 1 VIEW;  2/27/2025 5:52 pm   INDICATION: Signs/Symptoms:Chest Pain.     COMPARISON: 10/01/2019   ACCESSION NUMBER(S): [id number]   ORDERING CLINICIAN: ADONIS MCKEON   FINDINGS: Bronchial thickening is noted. There does appear to be slight fluid seen in the right fissure. No pneumothorax. No pneumothorax. No localizing infiltrate.       1.  No localizing infiltrate. Trace fluid seen in the right fissure.       MACRO: None   Signed by: Bassam Leon 2/27/2025 6:08 PM Dictation workstation:   [id number]        Home Medications  Prior to Admission medications    Medication Sig Start Date End Date Taking? Authorizing Provider   amLODIPine (Norvasc) 10 mg tablet Take 1 tablet (10 mg) by mouth once daily.   Yes Historical Provider, MD   calcium citrate (Calcitrate) 200 mg (950 mg) tablet Take 1 tablet (200 mg) by mouth once daily.   Yes Historical Provider, MD   dorzolamide-timoloL (Cosopt) 22.3-6.8 mg/mL ophthalmic solution Administer 1 drop into both eyes 2 times a day.    Yes Historical Provider, MD   hydroCHLOROthiazide (Microzide) 12.5 mg tablet Take 1 tablet (12.5 mg) by mouth once daily.   Yes Historical Provider, MD   latanoprost (Xalatan) 0.005 % ophthalmic solution Administer 1 drop into both eyes once daily at bedtime.   Yes Historical Provider, MD   lisinopril 20 mg tablet Take 1 tablet (20 mg) by mouth once daily.   Yes Historical Provider, MD   multivitamin tablet Take 1 tablet by mouth once daily.   Yes Historical Provider, MD   simvastatin (Zocor) 20 mg tablet Take 1 tablet (20 mg) by mouth once daily at bedtime.   Yes Historical Provider, MD   bacitracin 500 unit/gram ointment Apply topically 2 times a day.  Patient not taking: Reported on 2/27/2025 1/8/25   Andre Storey MD   sildenafil (Viagra) 100 mg tablet Take 1 tablet (100 mg) by mouth if needed for erectile dysfunction.    Historical Provider, MD   dorzolamide (Trusopt) 2 % ophthalmic solution Administer 1 drop into both eyes 2 times a day.  2/27/25  Historical Provider, MD       Medications  Scheduled medications  amLODIPine, 10 mg, oral, Daily  [Held by provider] calcium citrate, 200 mg, oral, Daily  cefTRIAXone, 1 g, intravenous, q24h  [Held by provider] dorzolamide-timoloL, 1 drop, Both Eyes, BID  heparin (porcine), 5,000 Units, subcutaneous, q8h  [Held by provider] hydroCHLOROthiazide, 12.5 mg, oral, Daily  latanoprost, 1 drop, Both Eyes, Nightly  [Held by provider] lisinopril, 20 mg, oral, Daily  simvastatin, 20 mg, oral, Nightly      Continuous medications  sodium bicarbonate 1 mEq/mL (8.4 %) 150 mEq in dextrose 5% 1,150 mL infusion, 100 mL/hr, Last Rate: 100 mL/hr (02/27/25 0048)  sodium chloride 0.9%, 100 mL/hr      PRN medications  PRN medications: acetaminophen **OR** acetaminophen **OR** acetaminophen, melatonin, polyethylene glycol        Assessment/Plan   Assessment & Plan  MOJGAN (acute kidney injury) (CMS-[facility])    UTI (urinary tract infection)    Neutrophilic  "leukocytosis    Hypermagnesemia    Hypercalcemia    Elevated troponin        Plan:  Code Status: Full Code- Needs to be re-discussed as patient was deemed not to have capacity.     Consult: Urology.  Nephrology.  Psychiatry-patient was deemed not to have capacity by ER physician and voiced feelings of depression \"states he wants to go home and if that means he dies, then he wants to go home and die\"      ATB: IV Rocephin     IVFs: Sodium bicarb at 100 mL/h for 1 L followed by 0.9% sodium chloride at 100 mL/h     Meds: Continuing home medications as appropriate.  Holding lisinopril, hydrochlorothiazide, and calcium citrate.  Tylenol 650 mg p.o. every 6 hours as needed for pain 1-6/headaches, melatonin 3 milligrams p.o. daily as needed for insomnia, MiraLAX 17 gms p.o daily as needed for constipation.  Avoid nephrotoxic medications      Diet: N.p.o. except sips with meds     Labs ordered: CBC, BMP, Mg. Troponin.  Monitor / trend labs while inpatient.  Replace electrolytes as needed.  Notify provider of K+ less than 4.0 or Mg2+ less than 2.0  Transfuse with PRBC for hemoglobin<7.0      Test ordered: Deferred to attending and consults     Additional Orders:  Kiran catheter was placed per nephrology recommendations- do not discontinue with direct provider orders  Telemetry monitoring   Vital signs with BP, HR, & RR Q 4 hours.  Pulse ox Q 4 hours.   Admission height and weight.  Daily weight.  Strict I&Os   Aspiration precautions.  Fall precautions   Out of bed with assistance   PT/OT eval and treat as indicated   Call physician for temperature < 36.5 or >38.0 degrees celsius.  Call physician for pulse <50 or >120.  Call physician for respiratory rate <10 or >22.  Call physician for systolic blood pressure <90 or >170.  Call physician for diastolic blood pressure < 50 or >100.  Call physician for pulse ox <92%.     VTE prophylaxis:   Subcutaneous heparin  BLE SCDs.  Monitor for s/s of bleeding     See additional orders " for further plan of care. Any further evaluation and management per attending and consulting physicians.       This note has been transcribed using Dragon voice recognition system and there is a possibility of unintentional typing misprints.  Any information found to be copied from previous providers is done in the best interest of the patient to provide accurate, quality, and continuity of care.      I spent 60 minutes in the professional and overall care of this patient.     LOBITO Villegas-CNP  [facility]

## 2025-02-28 NOTE — CARE PLAN
Pt remained safe throughout shift. Vitals stable. Pt c/o pain from marvin. Pt bleeding around marvin.Bere Jung notified. See orders.

## 2025-02-28 NOTE — CARE PLAN
The patient's goals for the shift include    Problem: Pain - Adult  Goal: Verbalizes/displays adequate comfort level or baseline comfort level  Outcome: Progressing     Problem: Safety - Adult  Goal: Free from fall injury  Outcome: Progressing     Problem: Discharge Planning  Goal: Discharge to home or other facility with appropriate resources  Outcome: Progressing     Problem: Chronic Conditions and Co-morbidities  Goal: Patient's chronic conditions and co-morbidity symptoms are monitored and maintained or improved  Outcome: Progressing     Problem: Nutrition  Goal: Nutrient intake appropriate for maintaining nutritional needs  Outcome: Progressing       The clinical goals for the shift include Pt will remain HDS throughout shift    Patient remained hds throughout the shift. Patient safety maintained. Alarms on.

## 2025-02-28 NOTE — CARE PLAN
Brief nephrology note:    75-year-old male with history of Partial penectomy, circumcision January 2025.    seen in urology office today and sent to [facility] emergency department for evaluation of profound hypotension abnormal labs.  Labs demonstrated severe acute kidney injury with serum creatinine at 7.9 mg deciliter BUN of 134 EGFR 7 mL/min hypercalcemia calcium of 10.9, hyperkalemia potassium 5.3, hyponatremia sodium 134, mild metabolic acidosis bicarb of 21, urinalysis suggestive of urinary tract infection urine micro positive for granular casts and hyaline cast the findings more suggestive of ATN.  Leukocytosis with WBC of 12.7 hemoglobin 10.2.    Plan:  Acute uremia    To continue IV hydration patient received 2 L normal saline bolus with improvement in blood pressure.  Hypotension fluid responsive.    Started on IV ceftriaxone for sepsis management.    Needs maintenance IV fluid to start sodium bicarb at 100 cc/h  To insert Kiran catheter  If there is no lack of response to conservative therapy may need renal replacement therapy including dialysis.    Needs images likely CT abdomen pelvis without IV contrast    Despite multiple educations and counseling patient insisting on leaving AMA.    This places him at risk of complications including cardiac death.

## 2025-02-28 NOTE — ASSESSMENT & PLAN NOTE
Admit patient to the medical floor with telemetry  Patient has Kiran catheter  Kidney function improving, serum creatinine 5  Nephrology is following

## 2025-02-28 NOTE — PROGRESS NOTES
"Capacity Assessment Tool    \"Capacity\" is the \"ability\" to make a decision.  The decision in question must be specific (one decision), relevant to a patient's current condition (appropriate), and timely (neither prospective nor retrospective).    Capacity varies based on knowledge base (explanation/understanding of clinical information), cognitive processing, acute psychiatric illness, and other clinical conditions.    In order to be deemed \"capacitated\" to make a single decision at one point in time, a patient must demonstrate all 4 of the following elements:    *Ability to consistently communicate a choice (consistent over time with adequate information)  *Ability to understand the relevant information (accurate knowledge of condition)  *Ability to appreciate the situation and its consequences (risks/benefits, pros/cons)  *Ability to reason about treatment options (without undue influence of a person or condition, eg. suicidality or acute psychosis)      Current Decision    Clinical issue:   MOJGAN with uremia    Did the appropriate team address relevant information with the patient:  Yes    Date: 2/27/25    If \"NO\" is selected for appropriate team, then please discuss with the appropriate team.  The appropriate team should be encouraged to address relevant information with the patient AND reevaluate capacity when appropriate.    Capacity Evaluation    Patient demonstrates ability to consistently communicate choice:  Yes Says he wants to go home and if that means he dies, then he wants to go home and die.    Patient demonstrates ability to understand the relevant information:  No Patient does not understand kidney function, its impact on his oliguria and the significance of his disease process.    Patient demonstrates ability to appreciate the situation and its consequences:  No    Patient demonstrates ability to reason about treatment options:  No    If ANY of the above items are answered \"NO,\" the patient LACKS " CAPACITY for that specific decision at hand, at that specific time.  Further capacity evaluations can be done as needed.

## 2025-02-28 NOTE — CONSULTS
INPATIENT NEPHROLOGY CONSULT NOTE        CONSULT: Nephrology SERVICE    PATIENT NAME: Wisam Austin    MRN: [id number]  DATE of SERVICE: February 28, 2025  TIME of SERVICE: 8:06 AM      REASON FOR CONSULT: MOJGAN  REQUESTING PHYSICIAN: Dr. Velazco   PRIMARY CARE PHYSICIAN: No Assigned PCP Generic Provider, MD    History of present illness:  Mr. Austin is a 75 y.o. male who presented to [facility] January 27, 2025 as a direct admit from urology office for worsening outpatient labs and hypotension.     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  Patient sent to the emergency department after his evaluation at urology office urology office due to profound hypotension and acute kidney injury.  Patient seen and evaluated at bedside, awake however has a poor insight into his medical condition.  Patient is a poor historian.  He does not believe that anything is wrong with him.  Kiran catheter inserted in the emergency department with dark urine in the bag.  Patient reports mild discomfort from Kiran catheter.    Vitals: Initially hypotensive with blood pressure of 85/55 tachypneic respiratory rate of 24 responded to IV fluid    Labs demonstrated severe acute kidney injury with serum creatinine at 7.9 mg deciliter BUN of 134 EGFR 7 mL/min hypercalcemia calcium of 10.9, hyperkalemia potassium 5.3, hyponatremia sodium 134, mild metabolic acidosis bicarb of 21, urinalysis suggestive of urinary tract infection urine micro positive for granular casts and hyaline cast the findings more suggestive of ATN.  Leukocytosis with WBC of 12.7 hemoglobin 10.2.    CT scan: Demonstrated findings related to enlarged and heterogeneous prostate 3.4 x 2.2 cm malignancy and infectious processes in the differential diagnosis.  No hydronephrosis.    Patient seen and evaluated at bedside sitting up in the chair awake and alert eating breakfast denies  nausea or vomiting.  Reports mild improvement in appetite since admission.  Kiran catheter in place reports mild discomfort.    ASSESSMENT AND PLAN:  Severe acute kidney injury likely ischemic and infectious related ATN with acute uremia:  -- urinalysis suggestive of urinary tract infection urine micro positive for granular casts and hyaline cast the findings more suggestive of ATN.   -- Overnight started on hydration received 2 L of normal saline bolus followed by the bicarbonate infusion at 100 cc/h  -- IV antibiotics started on IV ceftriaxone 2 g  -- Kiran catheter inserted in the emergency department  -- Scr gradually improving  -- To hold on initiating dialysis   -- CT abdomen pelvis ordered, reviewed    Sepsis, urinary tract infection, suspected prostatitis versus malignancy  -- IV fluid, 2 L of normal saline followed by normal saline infusion  -- IV ceftriaxone    Hypercalcemia calcium 10.9 likely secondary to dehydration versus possible underlying prostate CA    Hyperkalemia admission potassium 5.3 improving  Hyponatremia sodium 134 likely hypovolemic  Uremic acidemia, metabolic acidosis: Corrected with sodium bicarb and infusion    Plan:  Ischemic and infectious related ATN with acute uremia responding to medical management:  1. to continue IV hydration, currently receiving sodium bicarbonate infusion at 100 cc/h to be followed with normal saline at 100 cc/h  2.  Kiran catheter in place to monitor for postobstructive diuresis to replace insensible losses mL 2 mL  3.  To continue IV ceftriaxone and follow pending culture  4.   Hypercalcemia responded to IV hydration with repeat calcium down to 9.29  5.   Hyperkalemia improved with correcting metabolic acidosis  6.  Acute uremia on presentation gradually improving with improvement in urine output and drop of response to medical management, no urgent indication for renal placement therapy.  To resume renal diet  7.  Medication reviewed, renally adjusted  8.   Hypotension to hold amlodipine, hydrochlorothiazide  9.  To minimize narcotics due to acute kidney injury    Will follow, thank you!    I sincerely, thank you Dr. Velazco for this opportunity to participate in the care of your patient, I will follow from Nephrology point view!    PAST MEDICAL HISTORY:    Past Medical History:   Diagnosis Date    Cataract     Colon polyps     COVID-19     VACCINATED    Erectile dysfunction     Glaucoma     Hyperlipidemia     Hypertension     Myocardial infarction (Multi)     Penile lesion     Wears glasses        PAST SURGICAL HISTORY:    Past Surgical History:   Procedure Laterality Date    CATARACT EXTRACTION      CIRCUMCISION, NON-      COLONOSCOPY      OTHER SURGICAL HISTORY  2025    Partial penectomy    TRABECULECTOMY         FAMILY HISTORY:    Family History   Problem Relation Name Age of Onset    Cataracts Father      Glaucoma Father         SOCIAL HISTORY:    Social History     Tobacco Use    Smoking status: Former     Types: Cigarettes    Smokeless tobacco: Never   Vaping Use    Vaping status: Never Used   Substance Use Topics    Alcohol use: Not Currently    Drug use: Not Currently       MEDICATIONS:  Prior to Admission Medications:  Medications Prior to Admission   Medication Sig Dispense Refill Last Dose/Taking    amLODIPine (Norvasc) 10 mg tablet Take 1 tablet (10 mg) by mouth once daily.   2025    calcium citrate (Calcitrate) 200 mg (950 mg) tablet Take 1 tablet (200 mg) by mouth once daily.   2025    dorzolamide-timoloL (Cosopt) 22.3-6.8 mg/mL ophthalmic solution Administer 1 drop into both eyes 2 times a day.   2025    hydroCHLOROthiazide (Microzide) 12.5 mg tablet Take 1 tablet (12.5 mg) by mouth once daily.   2025    latanoprost (Xalatan) 0.005 % ophthalmic solution Administer 1 drop into both eyes once daily at bedtime.   2025    lisinopril 20 mg tablet Take 1 tablet (20 mg) by mouth once daily.   2025    multivitamin tablet  Take 1 tablet by mouth once daily.   2/27/2025    simvastatin (Zocor) 20 mg tablet Take 1 tablet (20 mg) by mouth once daily at bedtime.   2/26/2025    bacitracin 500 unit/gram ointment Apply topically 2 times a day. (Patient not taking: Reported on 2/27/2025) 30 g 1 Not Taking    sildenafil (Viagra) 100 mg tablet Take 1 tablet (100 mg) by mouth if needed for erectile dysfunction.   Unknown       INPATIENT MEDICATIONS:    Current Facility-Administered Medications:     acetaminophen (Tylenol) tablet 650 mg, 650 mg, oral, q6h PRN **OR** acetaminophen (Tylenol) oral liquid 650 mg, 650 mg, oral, q6h PRN **OR** acetaminophen (Tylenol) suppository 650 mg, 650 mg, rectal, q6h PRN, Bere Correai, APRN-CNP    amLODIPine (Norvasc) tablet 10 mg, 10 mg, oral, Daily, Bere Jung APRN-CNP    [Held by provider] calcium citrate (Calcitrate) tablet 200 mg, 200 mg, oral, Daily, Bere Correai, APRN-CNP    cefTRIAXone (Rocephin) 1 g in sodium chloride 0.9% IV 50 mL, 1 g, intravenous, q24h, Bere Jung APRN-CNP    [Held by provider] dorzolamide-timoloL (Cosopt) 22.3-6.8 mg/mL ophthalmic solution 1 drop, 1 drop, Both Eyes, BID, Bere Jung APRN-CNP    heparin (porcine) injection 5,000 Units, 5,000 Units, subcutaneous, q8h, Bere Jung, APRN-CNP, 5,000 Units at 02/27/25 2336    [Held by provider] hydroCHLOROthiazide (Microzide) tablet 12.5 mg, 12.5 mg, oral, Daily, Bere Jung APRN-CNP    latanoprost (Xalatan) 0.005 % ophthalmic solution 1 drop, 1 drop, Both Eyes, Nightly, Bree Correai, APRN-CNP, 1 drop at 02/27/25 2339    [Held by provider] lisinopril tablet 20 mg, 20 mg, oral, Daily, Bere Fabiana, APRN-CNP    melatonin tablet 3 mg, 3 mg, oral, Nightly PRN, Bere Fabiana, APRN-CNP, 3 mg at 02/28/25 2985    polyethylene glycol (Glycolax, Miralax) packet 17 g, 17 g, oral, Daily PRN, Bere Fabiana, APRN-CNP    simvastatin (Zocor) tablet 20 mg, 20 mg, oral, Nightly, Bere Fabiana, APRN-CNP, 20 mg at 02/27/25 2323    sodium bicarbonate 1 mEq/mL (8.4  "%) 150 mEq in dextrose 5% 1,150 mL infusion, 100 mL/hr, intravenous, Continuous, Bere Fabiana, APRN-CNP, Last Rate: 100 mL/hr at 02/27/25 2217, 100 mL/hr at 02/27/25 2217    sodium chloride 0.9% infusion, 100 mL/hr, intravenous, Continuous, Bere Fabiana, APRN-CNP    ALLERGIES:  Allergies   Allergen Reactions    Apraclonidine Other     Other reaction(s): Other: See Comments -  Irritation, not tolerable    Brimonidine Other     Other reaction(s): Other: See Comments -  Follicles       COMPLETE REVIEW OF SYSTEMS:    A comprehensive 14 point review of systems was obtained.  Constitutional: Fatigue, poor oral intake, intermittent chills  HENT: Negative for congestion and sore throat.    Eyes: Negative for pain and visual disturbance.  Respiratory: Negative for cough and shortness of breath.    Cardiovascular: Negative for chest pain and palpitations.  Gastrointestinal: Negative for abdominal pain, diarrhea and vomiting.  Genitourinary: Was anuric on admission  Musculoskeletal: Negative for back pain and myalgias.  Skin: negative for wound. Negative for color change and rash.  Neurological: Negative for weakness and headaches.  Hematological: Negative for adenopathy. Does not bruise/bleed easily.  Psychiatric/Behavioral: Slow to response, poor insight into health condition  All other reviewed and negative other than HPI.    PHYSICAL EXAM: Physical exam performed.  Patient Vitals for the past 24 hrs:   BP Temp Temp src Pulse Resp SpO2 Height Weight   02/28/25 0800 -- -- -- 66 17 -- -- --   02/28/25 0400 -- -- -- 68 16 -- -- --   02/28/25 0325 100/57 36.6 °C (97.9 °F) -- 80 18 98 % -- --   02/28/25 0000 -- -- -- 80 16 -- -- --   02/27/25 2335 128/59 36.8 °C (98.2 °F) -- 84 24 96 % -- --   02/27/25 2255 126/65 36.8 °C (98.2 °F) Temporal 88 19 100 % 1.676 m (5' 6\") 92.4 kg (203 lb 11.3 oz)   02/27/25 2239 116/69 -- -- 78 18 98 % -- --   02/27/25 2137 113/60 -- -- 74 18 98 % -- --   02/27/25 2100 -- -- -- -- -- 98 % -- -- " "  02/27/25 1924 101/58 -- -- 96 18 -- -- --   02/27/25 1923 100/55 -- -- 86 18 -- -- --   02/27/25 1905 111/55 -- -- 70 18 99 % -- --   02/27/25 1851 83/50 -- -- 68 20 99 % -- --   02/27/25 1831 86/58 -- -- 72 (!) 22 94 % -- --   02/27/25 1755 85/55 -- -- 71 (!) 24 99 % -- --   02/27/25 1745 89/62 36.4 °C (97.5 °F) Tympanic 70 (!) 24 99 % -- --   02/27/25 1655 84/59 36 °C (96.8 °F) -- 73 18 100 % -- --   02/27/25 1534 -- -- -- -- -- 100 % -- --   02/27/25 1524 98/57 36.2 °C (97.2 °F) -- 71 16 96 % 1.676 m (5' 6\") 91.2 kg (201 lb)     Body mass index is 32.88 kg/m².    CONSTITUTIONAL:  Vital signs reviewed.  Hypertensive, tachypneic  GENERAL: Chronically ill looking -American male  SKIN: No petechia or ecchymosis  HEAD/SINUSES: Atraumatic  EYES: PERRLA, + pallor  OROPHARYNX: Dry mucous membranes  NECK: +  jugulovenous distention, No carotid bruits, Carotid pulse normal contour, Supple  LUNGS: Diminished air entry bilaterally, no rales  CARDIAC: distant S1 and S2; no rubs, murmurs, or gallops  ABDOMEN: Abdomen soft, non-tender, BS normal, distended  EXTREMITIES: Good capillary refill, no edema.  NEUROLOGIC: Awake, alert and oriented ×3, poor historian  HEMATOLOGIC/Lymphatic/Immunologic: No abnormal bleeding, echymosis, inflammation. No cervical or supraclavicular lymphadenopathy.    DATA:  Intake/Output last 3 shifts:  I/O last 3 completed shifts:  In: 120 (1.3 mL/kg) [P.O.:120]  Out: 600 (6.5 mL/kg) [Urine:600 (0.2 mL/kg/hr)]  Weight: 92.4 kg     Diagnostic tests reviewed for today's visit:    CBC, Coags, RNP, Mg, Phos   Results from last 7 days   Lab Units 02/28/25  0718   WBC AUTO x10*3/uL 8.8   RBC AUTO x10*6/uL 3.10*   HEMOGLOBIN g/dL 8.9*   HEMATOCRIT % 29.0*     Results from last 7 days   Lab Units 02/28/25 0718 02/27/25  1653   SODIUM mmol/L 135* 134*   POTASSIUM mmol/L 4.1 5.3   CHLORIDE mmol/L 100 97*   CO2 mmol/L 21 21   BUN mg/dL 116* 134*   CREATININE mg/dL 5.62* 7.86*   CALCIUM mg/dL 9.9 10.9* " "  MAGNESIUM mg/dL 2.74* 3.07*   BILIRUBIN TOTAL mg/dL  --  0.8   ALT U/L  --  10   AST U/L  --  15     Results from last 7 days   Lab Units 02/27/25  1916   COLOR U  Yellow   APPEARANCE U  Turbid*   PH U  5.0   SPEC GRAV UR  1.019   PROTEIN U mg/dL 30 (1+)*   BLOOD UR mg/dL NEGATIVE   NITRITE U  NEGATIVE   WBC UR /HPF 21-50*   BACTERIA UR /HPF 1+*   Enlarged and heterogeneous prostate gland with 3.4 x 2.2 cm  low-density focus in the right aspect of the prostate gland is  nonspecific. Malignancy and infection would be on the differential.  Correlate with PSA and WBC levels.  *No hydronephrosis. No renal or ureteral calculus.  *No CT finding to suggest acute diverticulitis or colitis.  Appendix normal.  *Coronary artery calcification, a marker for coronary  atherosclerotic disease.  IMAGING: CXR reviewed in  images.      SIGNATURE: Phyllis Jane MD  Nephrology and Hypertension  [address] [facility] [place]  Office phone: [phone]  FAX: [phone]      This note was partially created using voice recognition software and is inherently subject to errors including those of syntax and \"sound-alike\" substitutions which may escape proofreading.  In such instances, original meaning may be extrapolated by contextual derivation.    "

## 2025-02-28 NOTE — CONSULTS
Reason for consult:  Depression capacity eval    History Of Present Illness  Wisam Austin is a 75 y.o. male presenting with hypotension.  Patient was able to tell me the nature of his current problem and the circumstance of the admission to the hospital.  Patient reports that he was in pain when he came to the ER and he got frustrated when people kept on asking the same question again and again.  He remember making the statement that he would rather go home and die without meaning that he wanted to commit suicide but wanted to be left alone  Patient denies ever having any suicidal thoughts  He denies feeling depressed  Denies any hopeless helpless or worthless feeling  Patient is worried about his ongoing medical issues but based on his Hindu belief he said that when his time comes to leave this world he will leave but would never harm himself  Patient has close family member including his brothers and sisters living nearby with whom he has regular contact with  He has 2 daughters living in [place] number of grandchildren living all around the country  Patient lives independently at home and has been functioning without any problems  Denies any audiovisual hallucinations or paranoid thoughts  No OCD or PTSD symptoms  Patient report that he has access to guns at home for self protection but he has never thought about using it to commit suicide and he will never give up on these weapons    No significant past psychiatric history    No chemical dependency history        Past Medical History  He has a past medical history of Cataract, Colon polyps, COVID-19, Erectile dysfunction, Glaucoma, Hyperlipidemia, Hypertension, Myocardial infarction (Multi), Penile lesion, and Wears glasses.    Surgical History  He has a past surgical history that includes Cataract extraction; Colonoscopy; Other surgical history (2025); Circumcision, non-; and Trabeculectomy.     Social History  He reports that he has quit  "smoking. His smoking use included cigarettes. He has never used smokeless tobacco. He reports that he does not currently use alcohol. He reports that he does not currently use drugs.     Allergies  Apraclonidine and Brimonidine        Mental Status Exam  Alert and oriented x 3 cooperative maintain eye contact during the interview normal psychomotor activity  Speech is normal in rate and rhythm coherent  Patient describes his mood to be anxious but not depressed  Affect is within normal range  Appeared irritable when asked about the question about suicide  No formal thought disorder  No delusions or hallucinations  Judgment and insight is intact    Psychiatric Risk Assessment  Although patient has access to guns, he has strong protective factor including his Sikh belief and never thought about committing suicide.  Moreover he has very strong family support with home he said he can talk to if needed    Last Recorded Vitals  Blood pressure 101/56, pulse 86, temperature 36.6 °C (97.9 °F), temperature source Temporal, resp. rate 22, height 1.676 m (5' 6\"), weight 92.4 kg (203 lb 11.3 oz), SpO2 96%.    Relevant Results  Results for orders placed or performed during the hospital encounter of 02/27/25 (from the past 96 hours)   CBC with Differential   Result Value Ref Range    WBC 12.7 (H) 4.4 - 11.3 x10*3/uL    nRBC 0.0 0.0 - 0.0 /100 WBCs    RBC 3.53 (L) 4.50 - 5.90 x10*6/uL    Hemoglobin 10.2 (L) 13.5 - 17.5 g/dL    Hematocrit 34.5 (L) 41.0 - 52.0 %    MCV 98 80 - 100 fL    MCH 28.9 26.0 - 34.0 pg    MCHC 29.6 (L) 32.0 - 36.0 g/dL    RDW 12.4 11.5 - 14.5 %    Platelets 356 150 - 450 x10*3/uL    Neutrophils % 71.1 40.0 - 80.0 %    Immature Granulocytes %, Automated 0.5 0.0 - 0.9 %    Lymphocytes % 18.5 13.0 - 44.0 %    Monocytes % 7.3 2.0 - 10.0 %    Eosinophils % 1.8 0.0 - 6.0 %    Basophils % 0.8 0.0 - 2.0 %    Neutrophils Absolute 9.03 (H) 1.60 - 5.50 x10*3/uL    Immature Granulocytes Absolute, Automated 0.06 0.00 " - 0.50 x10*3/uL    Lymphocytes Absolute 2.34 0.80 - 3.00 x10*3/uL    Monocytes Absolute 0.92 (H) 0.05 - 0.80 x10*3/uL    Eosinophils Absolute 0.23 0.00 - 0.40 x10*3/uL    Basophils Absolute 0.10 0.00 - 0.10 x10*3/uL   Troponin I, High Sensitivity, Initial   Result Value Ref Range    Troponin I, High Sensitivity 51 (HH) 0 - 20 ng/L   B-type natriuretic peptide   Result Value Ref Range    BNP 54 0 - 99 pg/mL   Comprehensive Metabolic Panel   Result Value Ref Range    Glucose 121 (H) 74 - 99 mg/dL    Sodium 134 (L) 136 - 145 mmol/L    Potassium 5.3 3.5 - 5.3 mmol/L    Chloride 97 (L) 98 - 107 mmol/L    Bicarbonate 21 21 - 32 mmol/L    Anion Gap 21 (H) 10 - 20 mmol/L    Urea Nitrogen 134 (HH) 6 - 23 mg/dL    Creatinine 7.86 (H) 0.50 - 1.30 mg/dL    eGFR 7 (L) >60 mL/min/1.73m*2    Calcium 10.9 (H) 8.6 - 10.3 mg/dL    Albumin 4.0 3.4 - 5.0 g/dL    Alkaline Phosphatase 64 33 - 136 U/L    Total Protein 8.4 (H) 6.4 - 8.2 g/dL    AST 15 9 - 39 U/L    Bilirubin, Total 0.8 0.0 - 1.2 mg/dL    ALT 10 10 - 52 U/L   Protime-INR   Result Value Ref Range    Protime 13.5 (H) 9.8 - 12.4 seconds    INR 1.2 (H) 0.9 - 1.1   Troponin, High Sensitivity, 1 Hour   Result Value Ref Range    Troponin I, High Sensitivity 63 (HH) 0 - 20 ng/L   Magnesium   Result Value Ref Range    Magnesium 3.07 (H) 1.60 - 2.40 mg/dL   ECG 12 lead   Result Value Ref Range    Ventricular Rate 72 BPM    Atrial Rate 72 BPM    MN Interval 158 ms    QRS Duration 86 ms    QT Interval 376 ms    QTC Calculation(Bazett) 411 ms    P Axis 76 degrees    R Axis 48 degrees    T Axis 54 degrees    QRS Count 12 beats    Q Onset 223 ms    P Onset 144 ms    P Offset 206 ms    T Offset 411 ms    QTC Fredericia 399 ms   Urinalysis with Reflex Culture and Microscopic   Result Value Ref Range    Color, Urine Yellow Light-Yellow, Yellow, Dark-Yellow    Appearance, Urine Turbid (N) Clear    Specific Gravity, Urine 1.019 1.005 - 1.035    pH, Urine 5.0 5.0, 5.5, 6.0, 6.5, 7.0, 7.5, 8.0     Protein, Urine 30 (1+) (A) NEGATIVE, 10 (TRACE), 20 (TRACE) mg/dL    Glucose, Urine Normal Normal mg/dL    Blood, Urine NEGATIVE NEGATIVE mg/dL    Ketones, Urine TRACE (A) NEGATIVE mg/dL    Bilirubin, Urine 1 (1+) (A) NEGATIVE mg/dL    Urobilinogen, Urine 4 (2+) (A) Normal mg/dL    Nitrite, Urine NEGATIVE NEGATIVE    Leukocyte Esterase, Urine 250 Bindu/uL (A) NEGATIVE   Extra Urine Gray Tube   Result Value Ref Range    Extra Tube Hold for add-ons.    Microscopic Only, Urine   Result Value Ref Range    WBC, Urine 21-50 (A) 1-5, NONE /HPF    RBC, Urine 3-5 NONE, 1-2, 3-5 /HPF    Squamous Epithelial Cells, Urine 1-9 (SPARSE) Reference range not established. /HPF    Bacteria, Urine 1+ (A) NONE SEEN /HPF    Mucus, Urine FEW Reference range not established. /LPF    Hyaline Casts, Urine 4+ (A) NONE /LPF    Fine Granular Casts, Urine 1+ (A) NONE /LPF   Blood Culture    Specimen: Peripheral Venipuncture; Blood culture   Result Value Ref Range    Blood Culture Loaded on Instrument - Culture in progress    Blood Culture    Specimen: Peripheral Venipuncture; Blood culture   Result Value Ref Range    Blood Culture Loaded on Instrument - Culture in progress    Basic metabolic panel   Result Value Ref Range    Glucose 175 (H) 74 - 99 mg/dL    Sodium 135 (L) 136 - 145 mmol/L    Potassium 4.1 3.5 - 5.3 mmol/L    Chloride 100 98 - 107 mmol/L    Bicarbonate 21 21 - 32 mmol/L    Anion Gap 18 10 - 20 mmol/L    Urea Nitrogen 116 (HH) 6 - 23 mg/dL    Creatinine 5.62 (H) 0.50 - 1.30 mg/dL    eGFR 10 (L) >60 mL/min/1.73m*2    Calcium 9.9 8.6 - 10.3 mg/dL   CBC   Result Value Ref Range    WBC 8.8 4.4 - 11.3 x10*3/uL    nRBC 0.0 0.0 - 0.0 /100 WBCs    RBC 3.10 (L) 4.50 - 5.90 x10*6/uL    Hemoglobin 8.9 (L) 13.5 - 17.5 g/dL    Hematocrit 29.0 (L) 41.0 - 52.0 %    MCV 94 80 - 100 fL    MCH 28.7 26.0 - 34.0 pg    MCHC 30.7 (L) 32.0 - 36.0 g/dL    RDW 12.4 11.5 - 14.5 %    Platelets 283 150 - 450 x10*3/uL   Magnesium   Result Value Ref Range     Magnesium 2.74 (H) 1.60 - 2.40 mg/dL   Troponin I, High Sensitivity   Result Value Ref Range    Troponin I, High Sensitivity 41 (H) 0 - 20 ng/L     ECG 12 lead    Result Date: 2/28/2025  Normal sinus rhythm Normal ECG When compared with ECG of 27-DEC-2024 10:59, Aberrant conduction is no longer Present Nonspecific T wave abnormality no longer evident in Inferior leads Nonspecific T wave abnormality no longer evident in Lateral leads    CT abdomen pelvis wo IV contrast    Result Date: 2/27/2025  STUDY: CT Abdomen and Pelvis without IV Contrast; 2/27/2025 at 9:54 PM. INDICATION: Severe MOJGAN, decreased urine output. COMPARISON: None available. ACCESSION NUMBER(S): [id number] ORDERING CLINICIAN: ADONIS MCKEON TECHNIQUE: CT of the abdomen and pelvis was performed.  Contiguous axial images were obtained at 3 mm slice thickness through the abdomen and pelvis. Coronal and sagittal reconstructions at 3 mm slice thickness were performed. No intravenous contrast was administered.  Automated mA/kV exposure control was utilized and patient examination was performed in strict accordance with principles of ALARA. FINDINGS: PLEASE NOTE THAT IN THE ABSENCE OF IV CONTRAST, SENSITIVITY FOR DETECTING VISCERAL AND VASCULAR ABNORMALITIES AS WELL AS INFLAMMATORY AND NEOPLASTIC CONDITIONS IS REDUCED. Imaged lower chest: No acute finding. There is coronary artery calcification, a marker for coronary atherosclerotic disease. Liver/gallbladder: Normal hepatic morphology. No suspicious space-occupying hepatic lesion. Gallbladder contains layering hyperdense material which is probably sludge. Adrenal: No adrenal nodule. Renal: No hydronephrosis. No suspicious renal lesion. No renal or ureteral calculus. Spleen: Normal in size. Pancreas: Unremarkable without surrounding infiltration or fluid. Vascular: Aorta and common iliac arteries normal in caliber. Urinary bladder: Decompressed. Reproductive organs: Enlarged and heterogeneous prostate gland  measures 7.1 cm in transverse dimension. 3.4 x 2.2 cm low-density focus in the right aspect of the prostate gland image 142, series 201 is nonspecific. GI: No CT finding to suggest acute diverticulitis or colitis. No dilated small bowel loops. Appendix normal. Other: No drainable fluid collection. No free air. Musculoskeletal: No suspicious osseous lesion. No acute fracture identified.    *Enlarged and heterogeneous prostate gland with 3.4 x 2.2 cm low-density focus in the right aspect of the prostate gland is nonspecific. Malignancy and infection would be on the differential. Correlate with PSA and WBC levels. *No hydronephrosis. No renal or ureteral calculus. *No CT finding to suggest acute diverticulitis or colitis. Appendix normal. *Coronary artery calcification, a marker for coronary atherosclerotic disease. Signed by Ladarius Bone MD    XR chest 1 view    Result Date: 2/27/2025  Interpreted By:  Bassam Leon, STUDY: XR CHEST 1 VIEW;  2/27/2025 5:52 pm   INDICATION: Signs/Symptoms:Chest Pain.     COMPARISON: 10/01/2019   ACCESSION NUMBER(S): [id number]   ORDERING CLINICIAN: ADONIS MCKEON   FINDINGS: Bronchial thickening is noted. There does appear to be slight fluid seen in the right fissure. No pneumothorax. No pneumothorax. No localizing infiltrate.       1.  No localizing infiltrate. Trace fluid seen in the right fissure.       MACRO: None   Signed by: Bassam Leon 2/27/2025 6:08 PM Dictation workstation:   [id number]         Assessment/Plan   Assessment & Plan  MOJGAN (acute kidney injury) ([facility])    UTI (urinary tract infection)    Neutrophilic leukocytosis    Hypermagnesemia    Hypercalcemia    Elevated troponin    Enlarged prostate      Adjustment disorder with depressed mood    Patient has capacity to make decisions regarding his treatment and discharge plan  Patient is not at imminent risk of suicide.  Can be safely discharged when medically stable  Does not meet criteria for inpatient  admission to psychiatric unit  Offered medication for anxiety/depression but patient did not want to take any medication since he believes that he is not depressed and he does not need any active psychiatric help  Please call me if needed       Neptali Velasquez MD

## 2025-02-28 NOTE — CONSULTS
[facility]  Consult Note    CC:   Chief Complaint   Patient presents with    Hypotension     Pt reports he sent by PCP for low BP in office. Pt denies SOB, HA, CP.     HPI: Wisam Austin is a 75 y.o. malewho I have been consulted to see regarding acute renal failure.    Interval Hx: 75-year-old -American gentleman known to me for a history of a large genital wart.  Recently underwent resection.  Presented to my office yesterday for routine postoperative follow-up.  Complained of lethargy, malaise, weakness, and decreased urine output.  Was found to be hypotensive and sent to the emergency room.  Was found to be in acute renal failure.  Admitted for further care.  Kiran catheter placed on admission.  Imaging did not indicate evidence of urinary retention.  Kiran catheter in place and draining clear urine    ROS: 12pt ROS otherwise negative unless stated above in HPI      Past Medical History:   Diagnosis Date    Cataract     Colon polyps     COVID-19     VACCINATED    Erectile dysfunction     Glaucoma     Hyperlipidemia     Hypertension     Myocardial infarction (Multi)     Penile lesion     Wears glasses      Past Surgical History:   Procedure Laterality Date    CATARACT EXTRACTION      CIRCUMCISION, NON-      COLONOSCOPY      OTHER SURGICAL HISTORY  2025    Partial penectomy    TRABECULECTOMY       Social History     Socioeconomic History    Marital status:    Tobacco Use    Smoking status: Former     Types: Cigarettes    Smokeless tobacco: Never   Vaping Use    Vaping status: Never Used   Substance and Sexual Activity    Alcohol use: Not Currently    Drug use: Not Currently    Sexual activity: Defer     Social Drivers of Health     Financial Resource Strain: Low Risk  (2025)    Overall Financial Resource Strain (CARDIA)     Difficulty of Paying Living Expenses: Not hard at all   Food Insecurity: No Food Insecurity (2025)    Hunger Vital Sign     Worried About Running Out  of Food in the Last Year: Never true     Ran Out of Food in the Last Year: Never true   Transportation Needs: No Transportation Needs (2/28/2025)    PRAPARE - Transportation     Lack of Transportation (Medical): No     Lack of Transportation (Non-Medical): No   Physical Activity: Sufficiently Active (8/26/2024)    Received from [facility]    Exercise Vital Sign     Days of Exercise per Week: 3 days     Minutes of Exercise per Session: 90 min   Intimate Partner Violence: Not At Risk (2/27/2025)    Humiliation, Afraid, Rape, and Kick questionnaire     Fear of Current or Ex-Partner: No     Emotionally Abused: No     Physically Abused: No     Sexually Abused: No   Housing Stability: Low Risk  (2/28/2025)    Housing Stability Vital Sign     Unable to Pay for Housing in the Last Year: No     Number of Times Moved in the Last Year: 0     Homeless in the Last Year: No     Family History   Problem Relation Name Age of Onset    Cataracts Father      Glaucoma Father       Allergies   Allergen Reactions    Apraclonidine Other     Other reaction(s): Other: See Comments -  Irritation, not tolerable    Brimonidine Other     Other reaction(s): Other: See Comments -  Follicles     Scheduled medications  amLODIPine, 10 mg, oral, Daily  cefTRIAXone, 1 g, intravenous, q24h  [Held by provider] dorzolamide-timoloL, 1 drop, Both Eyes, BID  heparin (porcine), 5,000 Units, subcutaneous, q8h  [Held by provider] hydroCHLOROthiazide, 12.5 mg, oral, Daily  latanoprost, 1 drop, Both Eyes, Nightly  [Held by provider] lisinopril, 20 mg, oral, Daily  simvastatin, 20 mg, oral, Nightly      Continuous medications  sodium chloride 0.9%, 100 mL/hr, Last Rate: 100 mL/hr (02/28/25 0823)      PRN medications  PRN medications: acetaminophen **OR** acetaminophen **OR** acetaminophen, melatonin, polyethylene glycol    Objective                                                                                                                                    Intake/Output Summary (Last 24 hours) at 2/28/2025 1732  Last data filed at 2/28/2025 1500  Gross per 24 hour   Intake 120 ml   Output 1100 ml   Net -980 ml     Lab Results   Component Value Date    WBC 8.8 02/28/2025    HGB 8.9 (L) 02/28/2025    HCT 29.0 (L) 02/28/2025    MCV 94 02/28/2025     02/28/2025     Lab Results   Component Value Date    GLUCOSE 175 (H) 02/28/2025    CALCIUM 9.9 02/28/2025     (L) 02/28/2025    K 4.1 02/28/2025    CO2 21 02/28/2025     02/28/2025     (HH) 02/28/2025    CREATININE 5.62 (H) 02/28/2025     Lab Results   Component Value Date    ALT 10 02/27/2025    AST 15 02/27/2025    ALKPHOS 64 02/27/2025    BILITOT 0.8 02/27/2025       Imaging                                                                                                                                     Component  Ref Range & Units 1 d ago   Color, Urine  Light-Yellow, Yellow, Dark-Yellow Yellow   Appearance, Urine  Clear Turbid Normal (N)   Specific Gravity, Urine  1.005 - 1.035 1.019   pH, Urine  5.0, 5.5, 6.0, 6.5, 7.0, 7.5, 8.0 5.0   Protein, Urine  NEGATIVE, 10 (TRACE), 20 (TRACE) mg/dL 30 (1+) Abnormal    Glucose, Urine  Normal mg/dL Normal   Blood, Urine  NEGATIVE mg/dL NEGATIVE   Ketones, Urine  NEGATIVE mg/dL TRACE Abnormal    Bilirubin, Urine  NEGATIVE mg/dL 1 (1+) Abnormal    Urobilinogen, Urine  Normal mg/dL 4 (2+) Abnormal    Comment: Some pigments and medications may cause a false positive urobilinogen.   Nitrite, Urine  NEGATIVE NEGATIVE   Leukocyte Esterase, Urine  NEGATIVE 250 Bindu/uL Abnormal    Resulting Agency [facility]          Physical Exam                                                                                                                      Vitals:    02/28/25 1600   BP:    Pulse: 66   Resp: 17   Temp:    SpO2:        General: in NAD, appears stated age  Head: normocephalic, atraumatic  Neck: supple; trachea is midline  Respiratory: normal  effort, no use of accessory muscles  Cardiovascular: no peripheral edema  Abdomen: soft, nondistended, nontender, no rebound or guarding, no organomegaly, no CVA tenderness, no hernia  Lymphatic: no lymphadenopathy noted  Skin: normal turgor, no rashes  Neurologic: grossly intact, oriented to person/place/time  Psychiatric: mode and affect appropriate  : Well-healed circumcision incision, healed biopsy site.  Testicles normal, epididymis normal    Assessment & Plan                                                                                                              Wisam Austin is a 75 y.o. male     Impression: Acute renal failure.  No evidence of post renal obstruction.    Plan:    Maintain Kiran catheter for now and trend creatinine.  Consider trial of void once creatinine nadirs.  No acute urologic intervention planned    Reviewed and approved by ALEXANDER GALVEZ on 2/28/25 at 5:32 PM.

## 2025-02-28 NOTE — SIGNIFICANT EVENT
Nephrology follow up.   Pt will be admitted. To cont Ivf, antibiotic, Kiran catheter overnight. I agree with Dr. osborn plan of care. Ct scan pending.     Npo after midnight. If acute uremia persist with no response to medical management may need RRT in 12 hrs.     Full eval to follow. Thank you!

## 2025-02-28 NOTE — PROGRESS NOTES
02/28/25 1113   Discharge Planning   Living Arrangements Alone   Support Systems Friends/neighbors   Assistance Needed none   Type of Residence Private residence   Number of Stairs to Enter Residence 3   Number of Stairs Within Residence 0   Do you have animals or pets at home? No   Who is requesting discharge planning? Provider   Home or Post Acute Services None   Expected Discharge Disposition Home   Does the patient need discharge transport arranged? No   Financial Resource Strain   How hard is it for you to pay for the very basics like food, housing, medical care, and heating? Not hard   Housing Stability   In the last 12 months, was there a time when you were not able to pay the mortgage or rent on time? N   In the past 12 months, how many times have you moved where you were living? 0   At any time in the past 12 months, were you homeless or living in a shelter (including now)? N   Transportation Needs   In the past 12 months, has lack of transportation kept you from medical appointments or from getting medications? no   In the past 12 months, has lack of transportation kept you from meetings, work, or from getting things needed for daily living? No   Patient Choice   Patient / Family choosing to utilize agency / facility established prior to hospitalization No   Stroke Family Assessment   Stroke Family Assessment Needed No   Intensity of Service   Intensity of Service >30 min     Met with patient at bedside. Introduced self and role in hospital. Verified address and PCP is Idalia Tabor PA-C through [facility]. Uses [facility] for medications and has no issues obtaining/paying for them and manages his own medications. Patient does not use any ambulation devices, still drives, does his own grocery shopping and is independent with ADL's Denies any issues paying bills or getting food into the home. States that he has support from many friends and that they will transport him whenever needed. Feels that he is  able to manage at home and will return home on discharge with no needs. CT team to follow patient.

## 2025-03-01 LAB
ANION GAP SERPL CALC-SCNC: 15 MMOL/L (ref 10–20)
BUN SERPL-MCNC: 82 MG/DL (ref 6–23)
CALCIUM SERPL-MCNC: 9.7 MG/DL (ref 8.6–10.3)
CHLORIDE SERPL-SCNC: 105 MMOL/L (ref 98–107)
CO2 SERPL-SCNC: 22 MMOL/L (ref 21–32)
CREAT SERPL-MCNC: 3.05 MG/DL (ref 0.5–1.3)
EGFRCR SERPLBLD CKD-EPI 2021: 21 ML/MIN/1.73M*2
ERYTHROCYTE [DISTWIDTH] IN BLOOD BY AUTOMATED COUNT: 12.4 % (ref 11.5–14.5)
GLUCOSE SERPL-MCNC: 107 MG/DL (ref 74–99)
HCT VFR BLD AUTO: 27.4 % (ref 41–52)
HGB BLD-MCNC: 8.5 G/DL (ref 13.5–17.5)
MAGNESIUM SERPL-MCNC: 2.11 MG/DL (ref 1.6–2.4)
MCH RBC QN AUTO: 29.1 PG (ref 26–34)
MCHC RBC AUTO-ENTMCNC: 31 G/DL (ref 32–36)
MCV RBC AUTO: 94 FL (ref 80–100)
NRBC BLD-RTO: 0 /100 WBCS (ref 0–0)
PLATELET # BLD AUTO: 278 X10*3/UL (ref 150–450)
POTASSIUM SERPL-SCNC: 4.1 MMOL/L (ref 3.5–5.3)
RBC # BLD AUTO: 2.92 X10*6/UL (ref 4.5–5.9)
SODIUM SERPL-SCNC: 138 MMOL/L (ref 136–145)
WBC # BLD AUTO: 8.8 X10*3/UL (ref 4.4–11.3)

## 2025-03-01 PROCEDURE — 83735 ASSAY OF MAGNESIUM: CPT

## 2025-03-01 PROCEDURE — 2500000005 HC RX 250 GENERAL PHARMACY W/O HCPCS

## 2025-03-01 PROCEDURE — 2500000002 HC RX 250 W HCPCS SELF ADMINISTERED DRUGS (ALT 637 FOR MEDICARE OP, ALT 636 FOR OP/ED)

## 2025-03-01 PROCEDURE — 2500000001 HC RX 250 WO HCPCS SELF ADMINISTERED DRUGS (ALT 637 FOR MEDICARE OP)

## 2025-03-01 PROCEDURE — 85027 COMPLETE CBC AUTOMATED: CPT

## 2025-03-01 PROCEDURE — 36415 COLL VENOUS BLD VENIPUNCTURE: CPT

## 2025-03-01 PROCEDURE — 2500000002 HC RX 250 W HCPCS SELF ADMINISTERED DRUGS (ALT 637 FOR MEDICARE OP, ALT 636 FOR OP/ED): Performed by: INTERNAL MEDICINE

## 2025-03-01 PROCEDURE — 2500000004 HC RX 250 GENERAL PHARMACY W/ HCPCS (ALT 636 FOR OP/ED)

## 2025-03-01 PROCEDURE — 2500000002 HC RX 250 W HCPCS SELF ADMINISTERED DRUGS (ALT 637 FOR MEDICARE OP, ALT 636 FOR OP/ED): Performed by: NURSE PRACTITIONER

## 2025-03-01 PROCEDURE — 82374 ASSAY BLOOD CARBON DIOXIDE: CPT

## 2025-03-01 PROCEDURE — 2060000001 HC INTERMEDIATE ICU ROOM DAILY

## 2025-03-01 RX ORDER — TAMSULOSIN HYDROCHLORIDE 0.4 MG/1
0.4 CAPSULE ORAL DAILY
Status: DISCONTINUED | OUTPATIENT
Start: 2025-03-01 | End: 2025-03-04 | Stop reason: HOSPADM

## 2025-03-01 RX ORDER — OXYBUTYNIN CHLORIDE 5 MG/1
5 TABLET ORAL 3 TIMES DAILY
Status: DISCONTINUED | OUTPATIENT
Start: 2025-03-01 | End: 2025-03-04 | Stop reason: HOSPADM

## 2025-03-01 RX ADMIN — AMLODIPINE BESYLATE 10 MG: 10 TABLET ORAL at 09:34

## 2025-03-01 RX ADMIN — HEPARIN SODIUM 5000 UNITS: 5000 INJECTION, SOLUTION INTRAVENOUS; SUBCUTANEOUS at 18:03

## 2025-03-01 RX ADMIN — Medication 3 MG: at 22:07

## 2025-03-01 RX ADMIN — TAMSULOSIN HYDROCHLORIDE 0.4 MG: 0.4 CAPSULE ORAL at 14:58

## 2025-03-01 RX ADMIN — SODIUM CHLORIDE 100 ML/HR: 9 INJECTION, SOLUTION INTRAVENOUS at 05:56

## 2025-03-01 RX ADMIN — LATANOPROST 1 DROP: 50 SOLUTION OPHTHALMIC at 21:28

## 2025-03-01 RX ADMIN — OXYBUTYNIN CHLORIDE 5 MG: 5 TABLET ORAL at 21:27

## 2025-03-01 RX ADMIN — HEPARIN SODIUM 5000 UNITS: 5000 INJECTION, SOLUTION INTRAVENOUS; SUBCUTANEOUS at 09:34

## 2025-03-01 RX ADMIN — HEPARIN SODIUM 5000 UNITS: 5000 INJECTION, SOLUTION INTRAVENOUS; SUBCUTANEOUS at 00:14

## 2025-03-01 RX ADMIN — SIMVASTATIN 20 MG: 20 TABLET, FILM COATED ORAL at 21:28

## 2025-03-01 RX ADMIN — CEFTRIAXONE 1 G: 1 INJECTION, POWDER, FOR SOLUTION INTRAMUSCULAR; INTRAVENOUS at 19:38

## 2025-03-01 ASSESSMENT — PAIN DESCRIPTION - DESCRIPTORS
DESCRIPTORS: BURNING

## 2025-03-01 ASSESSMENT — COGNITIVE AND FUNCTIONAL STATUS - GENERAL
WALKING IN HOSPITAL ROOM: A LITTLE
DAILY ACTIVITIY SCORE: 24
CLIMB 3 TO 5 STEPS WITH RAILING: A LITTLE
MOBILITY SCORE: 22

## 2025-03-01 ASSESSMENT — PAIN SCALES - GENERAL
PAINLEVEL_OUTOF10: 10 - WORST POSSIBLE PAIN
PAINLEVEL_OUTOF10: 0 - NO PAIN
PAINLEVEL_OUTOF10: 0 - NO PAIN
PAINLEVEL_OUTOF10: 10 - WORST POSSIBLE PAIN
PAINLEVEL_OUTOF10: 4
PAINLEVEL_OUTOF10: 10 - WORST POSSIBLE PAIN
PAINLEVEL_OUTOF10: 4

## 2025-03-01 ASSESSMENT — PAIN - FUNCTIONAL ASSESSMENT
PAIN_FUNCTIONAL_ASSESSMENT: 0-10

## 2025-03-01 NOTE — CARE PLAN
Pt remained NSR on telemetry. Pt SBP , pt remained asymptomatic, [facility] NP notified, no intervention ordered for pt BP.  Pt has Normal saline infusing at 100 ml/hr continuous. Pt was medicated per order. Pt Kiran catheter maintained, pt c/o of burning when he needs to void. Pt safety maintained, call light at reach.      Problem: Pain - Adult  Goal: Verbalizes/displays adequate comfort level or baseline comfort level  3/1/2025 0630 by Jory Raines RN  Outcome: Progressing  Flowsheets (Taken 3/1/2025 0352)  Verbalizes/displays adequate comfort level or baseline comfort level:   Encourage patient to monitor pain and request assistance   Administer analgesics based on type and severity of pain and evaluate response   Assess pain using appropriate pain scale   Implement non-pharmacological measures as appropriate and evaluate response  3/1/2025 0352 by Jory Raines RN  Flowsheets (Taken 3/1/2025 0352)  Verbalizes/displays adequate comfort level or baseline comfort level:   Encourage patient to monitor pain and request assistance   Administer analgesics based on type and severity of pain and evaluate response   Assess pain using appropriate pain scale   Implement non-pharmacological measures as appropriate and evaluate response     Problem: Safety - Adult  Goal: Free from fall injury  3/1/2025 0630 by Jory Raines RN  Outcome: Met  Flowsheets (Taken 3/1/2025 0352)  Free from fall injury: Instruct family/caregiver on patient safety  3/1/2025 0352 by Jory Raines RN  Flowsheets (Taken 3/1/2025 0352)  Free from fall injury: Instruct family/caregiver on patient safety     Problem: Chronic Conditions and Co-morbidities  Goal: Patient's chronic conditions and co-morbidity symptoms are monitored and maintained or improved  3/1/2025 0630 by Jory Raines RN  Outcome: Progressing  Flowsheets (Taken 3/1/2025 0352)  Care Plan - Patient's Chronic Conditions and Co-Morbidity Symptoms are Monitored and Maintained or  Improved:   Monitor and assess patient's chronic conditions and comorbid symptoms for stability, deterioration, or improvement   Collaborate with multidisciplinary team to address chronic and comorbid conditions and prevent exacerbation or deterioration  3/1/2025 0352 by Jory Raines RN  Flowsheets (Taken 3/1/2025 0352)  Care Plan - Patient's Chronic Conditions and Co-Morbidity Symptoms are Monitored and Maintained or Improved:   Monitor and assess patient's chronic conditions and comorbid symptoms for stability, deterioration, or improvement   Collaborate with multidisciplinary team to address chronic and comorbid conditions and prevent exacerbation or deterioration     Problem: Skin  Goal: Participates in plan/prevention/treatment measures  3/1/2025 0630 by Jory Raines RN  Outcome: Met  Flowsheets (Taken 3/1/2025 0353)  Participates in plan/prevention/treatment measures:   Elevate heels   Increase activity/out of bed for meals  3/1/2025 0353 by Jory Raines RN  Flowsheets (Taken 3/1/2025 0353)  Participates in plan/prevention/treatment measures:   Elevate heels   Increase activity/out of bed for meals  Goal: Prevent/manage excess moisture  3/1/2025 0630 by Jory Raines RN  Outcome: Progressing  Flowsheets (Taken 3/1/2025 0353)  Prevent/manage excess moisture:   Cleanse incontinence/protect with barrier cream   Moisturize dry skin   Monitor for/manage infection if present  3/1/2025 0353 by Jory Raines RN  Flowsheets (Taken 3/1/2025 0353)  Prevent/manage excess moisture:   Cleanse incontinence/protect with barrier cream   Moisturize dry skin   Monitor for/manage infection if present  Goal: Prevent/minimize sheer/friction injuries  3/1/2025 0630 by Jory Raines RN  Outcome: Met  Flowsheets (Taken 3/1/2025 0353)  Prevent/minimize sheer/friction injuries:   HOB 30 degrees or less   Turn/reposition every 2 hours/use positioning/transfer devices   Use pull sheet   Increase activity/out of bed for  meals  3/1/2025 0353 by Jory Raines RN  Flowsheets (Taken 3/1/2025 0353)  Prevent/minimize sheer/friction injuries:   HOB 30 degrees or less   Turn/reposition every 2 hours/use positioning/transfer devices   Use pull sheet   Increase activity/out of bed for meals  Goal: Promote/optimize nutrition  3/1/2025 0630 by Jory Raines RN  Outcome: Met  Flowsheets (Taken 3/1/2025 0353)  Promote/optimize nutrition:   Offer water/supplements/favorite foods   Consume > 50% meals/supplements   Monitor/record intake including meals  3/1/2025 0353 by Jory Raines RN  Flowsheets (Taken 3/1/2025 0353)  Promote/optimize nutrition:   Offer water/supplements/favorite foods   Consume > 50% meals/supplements   Monitor/record intake including meals  Goal: Promote skin healing  3/1/2025 0630 by Jory Raines RN  Outcome: Progressing  Flowsheets (Taken 3/1/2025 0353)  Promote skin healing: Turn/reposition every 2 hours/use positioning/transfer devices  3/1/2025 0353 by Jory Raines RN  Flowsheets (Taken 3/1/2025 0353)  Promote skin healing: Turn/reposition every 2 hours/use positioning/transfer devices     Problem: Fall/Injury  Goal: Not fall by end of shift  Outcome: Met  Goal: Be free from injury by end of the shift  Outcome: Met

## 2025-03-01 NOTE — PROGRESS NOTES
INPATIENT NEPHROLOGY CONSULT PROGRESS NOTE      Patient Name: Wisam Austin MRN: [id number]  DATE of SERVICE: March 1, 2025  TIME of SERVICE: 12:07 PM  CONSULTING SERVICE: Nephrology    REASON for CONSULT: Acute kidney injury    SUBJECTIVE:  Seen and evaluated at bedside, awake.   Denies uremic symptoms. denies nausea, vomiting.  Continues to endorse pain and discomfort with Kiran catheter.   Patient has been having urinary leak.  Serum creatinine down to 3 from 5.6 EGFR up to 21 from 10 mL/min    SUMMARY OF STAY:  Mr. Austin is a 75 y.o. male who presented to [facility] January 27, 2025 as a direct admit from urology office for worsening outpatient labs and hypotension.    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  Patient sent to the emergency department after his evaluation at urology office urology office due to profound hypotension and acute kidney injury.  Patient seen and evaluated at bedside, awake however has a poor insight into his medical condition.  Patient is a poor historian.  He does not believe that anything is wrong with him.  Kiran catheter inserted in the emergency department with dark urine in the bag.  Patient reports mild discomfort from Kiran catheter.    ASSESSMENT:  Severe acute kidney injury likely ischemic and infectious related ATN with acute uremia:  -- urinalysis suggestive of urinary tract infection urine micro positive for granular casts and hyaline cast the findings more suggestive of ATN.   -- IV antibiotics: on IV ceftriaxone 2 g  -- Kiran catheter inserted in the emergency department  -- Scr gradually improving  -- CT abdomen pelvis ordered, reviewed.     Sepsis, urinary tract infection, suspected prostatitis versus malignancy.   -- IV ceftriaxone     Hypercalcemia calcium 10.9--> 9.7 likely secondary to dehydration versus possible underlying prostate CA     Hyperkalemia admission  potassium 5.3-- > 4.1 improving  Hyponatremia sodium 134--> 138 likely hypovolemic  Uremic acidemia, metabolic acidosis: Corrected with sodium bicarb and infusion    PLAN:  Ischemic and infectious related ATN with acute uremia responding to medical management:    1.  Postobstructive diuresis adequately managed with normal saline at the 100 cc/h, 1.5 L/12 hours.  2.  Kiran catheter in place, to add flomax to help with urinary spasm   3.  To continue IV ceftriaxone.  4.   Hypercalcemia responded to IV hydration with repeat calcium down to 9.29  5.   Hyperkalemia improved with correcting metabolic acidosis  6.   To cont renal diet, no urgent indication for renal placement therapy  7.  Medication reviewed, renally adjusted  8.  Hypotension to hold amlodipine, hydrochlorothiazide.  Blood pressure stays relatively low  9.  To minimize narcotics due to acute kidney injury     Will follow, thank you!    Medications:    Current Facility-Administered Medications:     acetaminophen (Tylenol) tablet 650 mg, 650 mg, oral, q6h PRN **OR** acetaminophen (Tylenol) oral liquid 650 mg, 650 mg, oral, q6h PRN **OR** acetaminophen (Tylenol) suppository 650 mg, 650 mg, rectal, q6h PRN, Bere Fabiana, APRN-CNP    amLODIPine (Norvasc) tablet 10 mg, 10 mg, oral, Daily, Bere Fabiana APRN-CNP, 10 mg at 03/01/25 0934    cefTRIAXone (Rocephin) 1 g in sodium chloride 0.9% IV 50 mL, 1 g, intravenous, q24h, Bere Jung APRN-CNP, Stopped at 02/28/25 1858    [Held by provider] dorzolamide-timoloL (Cosopt) 22.3-6.8 mg/mL ophthalmic solution 1 drop, 1 drop, Both Eyes, BID, Bere Correai, APRN-CNP    heparin (porcine) injection 5,000 Units, 5,000 Units, subcutaneous, q8h, Bere Fabiana, APRN-CNP, 5,000 Units at 03/01/25 0934    [Held by provider] hydroCHLOROthiazide (Microzide) tablet 12.5 mg, 12.5 mg, oral, Daily, Bere Fabiana, APRN-CNP    latanoprost (Xalatan) 0.005 % ophthalmic solution 1 drop, 1 drop, Both Eyes, Nightly, Bere Jung, APRN-CNP, 1 drop at  02/28/25 2030    [Held by provider] lisinopril tablet 20 mg, 20 mg, oral, Daily, Bere Fbaiana, APRN-CNP    melatonin tablet 3 mg, 3 mg, oral, Nightly PRN, Bere Fabiana, APRN-CNP, 3 mg at 02/28/25 2157    polyethylene glycol (Glycolax, Miralax) packet 17 g, 17 g, oral, Daily PRN, Bere Fabiana, APRN-CNP    simvastatin (Zocor) tablet 20 mg, 20 mg, oral, Nightly, Bere Fabiana, APRN-CNP, 20 mg at 02/28/25 2030    PERTINENT ROS:  GENERAL:  positive for fatigue, poor appetite.  No fever/chills  RESPIRATORY:  positive for shortness of breath.  Negative for cough, wheezing.  CARDIOVASCULAR:   Negative for chest pain or palpitation.  GI:  Negative for abdominal pain, diarrhea, heartburn, nausea, vomiting  : negative for dysuria, hematuria    Physical Exam:  Vital signs in last 24 hours:  Temp:  [36.2 °C (97.2 °F)-36.7 °C (98.1 °F)] 36.6 °C (97.9 °F)  Heart Rate:  [62-84] 72  Resp:  [16-22] 16  BP: ()/(50-63) 114/62    General: Awake, cooperative, not in acute distress  HEENT:  NCAT,  mucous membranes moist and pink  NECK:  Elevated JVD, no carotid bruit, supple, no cervical mass or thyromegaly  LUNGS;  Diminished breath sounds, fine Rales  CV:  Distant, regular rate and rhythm, no murmurs  ABDOMEN:  abdomen soft, nontender, BS normal, no masses or organomegaly  EDEMA:  +2 lower extremity edema/dependent edema  SKIN:  dry and normal turgor, no clubbing, cyanosis or petechia.  No rashes noted      Intake/Output last 3 shifts:  I/O last 3 completed shifts:  In: 720 (7.9 mL/kg) [P.O.:120; I.V.:600 (6.6 mL/kg)]  Out: 3350 (37 mL/kg) [Urine:3350 (1 mL/kg/hr)]  Weight: 90.6 kg     DATA:  Diagnotic tests reviewed for Todays visit:  Results from last 7 days   Lab Units 03/01/25  0503   WBC AUTO x10*3/uL 8.8   RBC AUTO x10*6/uL 2.92*   HEMOGLOBIN g/dL 8.5*   HEMATOCRIT % 27.4*     Results from last 7 days   Lab Units 03/01/25  0503 02/28/25  0718 02/27/25  1653   SODIUM mmol/L 138   < > 134*   POTASSIUM mmol/L 4.1   < > 5.3    CHLORIDE mmol/L 105   < > 97*   CO2 mmol/L 22   < > 21   BUN mg/dL 82*   < > 134*   CREATININE mg/dL 3.05*   < > 7.86*   CALCIUM mg/dL 9.7   < > 10.9*   MAGNESIUM mg/dL 2.11   < > 3.07*   BILIRUBIN TOTAL mg/dL  --   --  0.8   ALT U/L  --   --  10   AST U/L  --   --  15    < > = values in this interval not displayed.     Results from last 7 days   Lab Units 02/27/25  1916   COLOR U  Yellow   APPEARANCE U  Turbid*   PH U  5.0   SPEC GRAV UR  1.019   PROTEIN U mg/dL 30 (1+)*   BLOOD UR mg/dL NEGATIVE   NITRITE U  NEGATIVE   WBC UR /HPF 21-50*   BACTERIA UR /HPF 1+*     IMAGING: CXR reviewed in  images      SIGNATURE: Phyllis Jane MD  Nephrology and Hypertension  [address] [facility] [place]  Office phone: [phone]  FAX: [phone]    This note was partially generated using the Dragon voice recognition system, and there may be some incorrect words, spelling's and punctuation that were not noted in checking the note before saving.

## 2025-03-01 NOTE — NURSING NOTE
[facility] of pt BP being 91/50 map 64, pt HR between 70-80 bpm. Pt resting in bed, asymptomatic. Per Aster Kathleen NP, no intervention needed at this time.  Pt has Normal Saline infusing at 100 ml/hr continuous, will continue to monitor.

## 2025-03-02 LAB
ANION GAP SERPL CALC-SCNC: 14 MMOL/L (ref 10–20)
BACTERIA BLD CULT: NORMAL
BACTERIA BLD CULT: NORMAL
BUN SERPL-MCNC: 50 MG/DL (ref 6–23)
CALCIUM SERPL-MCNC: 10 MG/DL (ref 8.6–10.3)
CHLORIDE SERPL-SCNC: 107 MMOL/L (ref 98–107)
CO2 SERPL-SCNC: 20 MMOL/L (ref 21–32)
CREAT SERPL-MCNC: 1.87 MG/DL (ref 0.5–1.3)
EGFRCR SERPLBLD CKD-EPI 2021: 37 ML/MIN/1.73M*2
ERYTHROCYTE [DISTWIDTH] IN BLOOD BY AUTOMATED COUNT: 12.6 % (ref 11.5–14.5)
GLUCOSE SERPL-MCNC: 122 MG/DL (ref 74–99)
HCT VFR BLD AUTO: 28.9 % (ref 41–52)
HGB BLD-MCNC: 8.7 G/DL (ref 13.5–17.5)
MAGNESIUM SERPL-MCNC: 1.68 MG/DL (ref 1.6–2.4)
MCH RBC QN AUTO: 28.4 PG (ref 26–34)
MCHC RBC AUTO-ENTMCNC: 30.1 G/DL (ref 32–36)
MCV RBC AUTO: 94 FL (ref 80–100)
NRBC BLD-RTO: 0 /100 WBCS (ref 0–0)
PLATELET # BLD AUTO: 259 X10*3/UL (ref 150–450)
POTASSIUM SERPL-SCNC: 4.1 MMOL/L (ref 3.5–5.3)
RBC # BLD AUTO: 3.06 X10*6/UL (ref 4.5–5.9)
SODIUM SERPL-SCNC: 137 MMOL/L (ref 136–145)
WBC # BLD AUTO: 8.5 X10*3/UL (ref 4.4–11.3)

## 2025-03-02 PROCEDURE — 2500000002 HC RX 250 W HCPCS SELF ADMINISTERED DRUGS (ALT 637 FOR MEDICARE OP, ALT 636 FOR OP/ED)

## 2025-03-02 PROCEDURE — 83735 ASSAY OF MAGNESIUM: CPT

## 2025-03-02 PROCEDURE — 36415 COLL VENOUS BLD VENIPUNCTURE: CPT

## 2025-03-02 PROCEDURE — 80048 BASIC METABOLIC PNL TOTAL CA: CPT

## 2025-03-02 PROCEDURE — 2500000004 HC RX 250 GENERAL PHARMACY W/ HCPCS (ALT 636 FOR OP/ED)

## 2025-03-02 PROCEDURE — 2500000002 HC RX 250 W HCPCS SELF ADMINISTERED DRUGS (ALT 637 FOR MEDICARE OP, ALT 636 FOR OP/ED): Performed by: INTERNAL MEDICINE

## 2025-03-02 PROCEDURE — 2500000002 HC RX 250 W HCPCS SELF ADMINISTERED DRUGS (ALT 637 FOR MEDICARE OP, ALT 636 FOR OP/ED): Performed by: NURSE PRACTITIONER

## 2025-03-02 PROCEDURE — 2060000001 HC INTERMEDIATE ICU ROOM DAILY

## 2025-03-02 PROCEDURE — 85027 COMPLETE CBC AUTOMATED: CPT

## 2025-03-02 RX ADMIN — HEPARIN SODIUM 5000 UNITS: 5000 INJECTION, SOLUTION INTRAVENOUS; SUBCUTANEOUS at 20:38

## 2025-03-02 RX ADMIN — TAMSULOSIN HYDROCHLORIDE 0.4 MG: 0.4 CAPSULE ORAL at 10:35

## 2025-03-02 RX ADMIN — OXYBUTYNIN CHLORIDE 5 MG: 5 TABLET ORAL at 20:50

## 2025-03-02 RX ADMIN — LATANOPROST 1 DROP: 50 SOLUTION OPHTHALMIC at 20:39

## 2025-03-02 RX ADMIN — CEFTRIAXONE 1 G: 1 INJECTION, POWDER, FOR SOLUTION INTRAMUSCULAR; INTRAVENOUS at 20:38

## 2025-03-02 RX ADMIN — SIMVASTATIN 20 MG: 20 TABLET, FILM COATED ORAL at 20:50

## 2025-03-02 RX ADMIN — HEPARIN SODIUM 5000 UNITS: 5000 INJECTION, SOLUTION INTRAVENOUS; SUBCUTANEOUS at 10:37

## 2025-03-02 RX ADMIN — OXYBUTYNIN CHLORIDE 5 MG: 5 TABLET ORAL at 16:02

## 2025-03-02 RX ADMIN — HEPARIN SODIUM 5000 UNITS: 5000 INJECTION, SOLUTION INTRAVENOUS; SUBCUTANEOUS at 02:58

## 2025-03-02 RX ADMIN — OXYBUTYNIN CHLORIDE 5 MG: 5 TABLET ORAL at 10:35

## 2025-03-02 ASSESSMENT — PAIN - FUNCTIONAL ASSESSMENT
PAIN_FUNCTIONAL_ASSESSMENT: 0-10

## 2025-03-02 ASSESSMENT — COGNITIVE AND FUNCTIONAL STATUS - GENERAL
CLIMB 3 TO 5 STEPS WITH RAILING: A LITTLE
DAILY ACTIVITIY SCORE: 24
WALKING IN HOSPITAL ROOM: A LITTLE
MOBILITY SCORE: 22

## 2025-03-02 ASSESSMENT — PAIN DESCRIPTION - DESCRIPTORS
DESCRIPTORS: BURNING
DESCRIPTORS: BURNING

## 2025-03-02 ASSESSMENT — PAIN SCALES - GENERAL
PAINLEVEL_OUTOF10: 0 - NO PAIN
PAINLEVEL_OUTOF10: 9
PAINLEVEL_OUTOF10: 9
PAINLEVEL_OUTOF10: 3
PAINLEVEL_OUTOF10: 0 - NO PAIN
PAINLEVEL_OUTOF10: 0 - NO PAIN

## 2025-03-02 NOTE — CARE PLAN
Pt remained HDS, afebrile this RN shift. Pt AOx4. Pt Kiran catheter maintained, draining clear yellow urine. Pt medicated per order, safety maintained, call light at reach.

## 2025-03-02 NOTE — PROGRESS NOTES
Wisam Austin is a 75 y.o. male on day 2 of admission presenting with MOJGAN (acute kidney injury) (CMS-[facility]),     Subjective   No cp/SOB       Objective         Current Facility-Administered Medications:     acetaminophen (Tylenol) tablet 650 mg, 650 mg, oral, q6h PRN **OR** acetaminophen (Tylenol) oral liquid 650 mg, 650 mg, oral, q6h PRN **OR** acetaminophen (Tylenol) suppository 650 mg, 650 mg, rectal, q6h PRN, Bere Fabiana, APRN-CNP    amLODIPine (Norvasc) tablet 10 mg, 10 mg, oral, Daily, Bere Fabiana, APRN-CNP, 10 mg at 03/01/25 0934    cefTRIAXone (Rocephin) 1 g in sodium chloride 0.9% IV 50 mL, 1 g, intravenous, q24h, Bere Fabiana, APRN-CNP, Stopped at 03/01/25 2015    [Held by provider] dorzolamide-timoloL (Cosopt) 22.3-6.8 mg/mL ophthalmic solution 1 drop, 1 drop, Both Eyes, BID, Bere Fabiana, APRN-CNP    heparin (porcine) injection 5,000 Units, 5,000 Units, subcutaneous, q8h, Bere Fabiana, APRN-CNP, 5,000 Units at 03/01/25 1803    [Held by provider] hydroCHLOROthiazide (Microzide) tablet 12.5 mg, 12.5 mg, oral, Daily, Bere Fabiana, APRN-CNP    latanoprost (Xalatan) 0.005 % ophthalmic solution 1 drop, 1 drop, Both Eyes, Nightly, Bere Fabiana, APRN-CNP, 1 drop at 03/01/25 2128    [Held by provider] lisinopril tablet 20 mg, 20 mg, oral, Daily, Bere Fabiana, APRN-CNP    melatonin tablet 3 mg, 3 mg, oral, Nightly PRN, Bere Fabiana, APRN-CNP, 3 mg at 02/28/25 2157    oxybutynin (Ditropan) tablet 5 mg, 5 mg, oral, TID, Heathercharline Crowder, APRN-CNP, 5 mg at 03/01/25 2127    polyethylene glycol (Glycolax, Miralax) packet 17 g, 17 g, oral, Daily PRN, Bere Fabiana, APRN-CNP    simvastatin (Zocor) tablet 20 mg, 20 mg, oral, Nightly, Bere Fabiana, APRN-CNP, 20 mg at 03/01/25 2128    tamsulosin (Flomax) 24 hr capsule 0.4 mg, 0.4 mg, oral, Daily, Phyllis Jane MD, 0.4 mg at 03/01/25 1458       Physical Exam  HENT:      Head: Normocephalic.      Mouth/Throat:      Pharynx: Oropharynx is clear.   Eyes:      Conjunctiva/sclera:  "Conjunctivae normal.   Cardiovascular:      Rate and Rhythm: Regular rhythm.   Pulmonary:      Breath sounds: Normal breath sounds.   Abdominal:      General: Bowel sounds are normal.      Palpations: Abdomen is soft.   Musculoskeletal:         General: Normal range of motion.      Cervical back: Neck supple.   Skin:     General: Skin is warm and dry.   Neurological:      General: No focal deficit present.      Mental Status: He is alert.   Psychiatric:         Behavior: Behavior normal.           Last Recorded Vitals  Blood pressure 100/55, pulse 86, temperature 36.5 °C (97.7 °F), temperature source Temporal, resp. rate 20, height 1.676 m (5' 6\"), weight 90.6 kg (199 lb 11.8 oz), SpO2 97%.  Intake/Output last 3 Shifts:  I/O last 3 completed shifts:  In: 600 (6.6 mL/kg) [I.V.:600 (6.6 mL/kg)]  Out: 3750 (41.4 mL/kg) [Urine:3750 (1.1 mL/kg/hr)]  Weight: 90.6 kg     Labs:       Results for orders placed or performed during the hospital encounter of 02/27/25 (from the past 24 hours)   Basic metabolic panel   Result Value Ref Range    Glucose 107 (H) 74 - 99 mg/dL    Sodium 138 136 - 145 mmol/L    Potassium 4.1 3.5 - 5.3 mmol/L    Chloride 105 98 - 107 mmol/L    Bicarbonate 22 21 - 32 mmol/L    Anion Gap 15 10 - 20 mmol/L    Urea Nitrogen 82 (H) 6 - 23 mg/dL    Creatinine 3.05 (H) 0.50 - 1.30 mg/dL    eGFR 21 (L) >60 mL/min/1.73m*2    Calcium 9.7 8.6 - 10.3 mg/dL   CBC   Result Value Ref Range    WBC 8.8 4.4 - 11.3 x10*3/uL    nRBC 0.0 0.0 - 0.0 /100 WBCs    RBC 2.92 (L) 4.50 - 5.90 x10*6/uL    Hemoglobin 8.5 (L) 13.5 - 17.5 g/dL    Hematocrit 27.4 (L) 41.0 - 52.0 %    MCV 94 80 - 100 fL    MCH 29.1 26.0 - 34.0 pg    MCHC 31.0 (L) 32.0 - 36.0 g/dL    RDW 12.4 11.5 - 14.5 %    Platelets 278 150 - 450 x10*3/uL   Magnesium   Result Value Ref Range    Magnesium 2.11 1.60 - 2.40 mg/dL              Assessment/Plan   Assessment & Plan  MOJGAN (acute kidney injury) (CMS-[facility])     UTI (urinary tract infection)   "     Hypermagnesemia     Hypercalcemia     Elevated troponin     Enlarged prostate         PLAN: Patient is slowly improving, c/w IV CTX, c/w Ikran, monitor kidney function, med list and labs reviewed, appreciate input of urology, follow closely.               Luis Fernando Rowe MD

## 2025-03-02 NOTE — CARE PLAN
Problem: Pain - Adult  Goal: Verbalizes/displays adequate comfort level or baseline comfort level  Outcome: Not Progressing      Problem: Chronic Conditions and Co-morbidities  Goal: Patient's chronic conditions and co-morbidity symptoms are monitored and maintained or improved  Outcome: Progressing     Problem: Nutrition  Goal: Nutrient intake appropriate for maintaining nutritional needs  Outcome: Progress

## 2025-03-02 NOTE — PROGRESS NOTES
Wisam Austin is a 75 y.o. male on day 3 of admission presenting with MOJGAN (acute kidney injury) (CMS-[facility]), Kiran's remains in place      Subjective   No cp       Objective         Current Facility-Administered Medications:     acetaminophen (Tylenol) tablet 650 mg, 650 mg, oral, q6h PRN **OR** acetaminophen (Tylenol) oral liquid 650 mg, 650 mg, oral, q6h PRN **OR** acetaminophen (Tylenol) suppository 650 mg, 650 mg, rectal, q6h PRN, Bere Fabiana, APRN-CNP    amLODIPine (Norvasc) tablet 10 mg, 10 mg, oral, Daily, Bere Fabiana, APRN-CNP, 10 mg at 03/01/25 0934    cefTRIAXone (Rocephin) 1 g in sodium chloride 0.9% IV 50 mL, 1 g, intravenous, q24h, Bere Fabiana, APRN-CNP, Stopped at 03/01/25 2015    [Held by provider] dorzolamide-timoloL (Cosopt) 22.3-6.8 mg/mL ophthalmic solution 1 drop, 1 drop, Both Eyes, BID, Bere Fabiana, APRN-CNP    heparin (porcine) injection 5,000 Units, 5,000 Units, subcutaneous, q8h, Bere Fabiana, APRN-CNP, 5,000 Units at 03/02/25 1037    [Held by provider] hydroCHLOROthiazide (Microzide) tablet 12.5 mg, 12.5 mg, oral, Daily, Bere Fabiana, APRN-CNP    latanoprost (Xalatan) 0.005 % ophthalmic solution 1 drop, 1 drop, Both Eyes, Nightly, Bere Fabiana, APRN-CNP, 1 drop at 03/01/25 2128    [Held by provider] lisinopril tablet 20 mg, 20 mg, oral, Daily, Bere Fabiana, APRN-CNP    melatonin tablet 3 mg, 3 mg, oral, Nightly PRN, Bere Fabiana, APRN-CNP, 3 mg at 03/01/25 2207    oxybutynin (Ditropan) tablet 5 mg, 5 mg, oral, TID, Heather Crowder, APRN-CNP, 5 mg at 03/02/25 1602    polyethylene glycol (Glycolax, Miralax) packet 17 g, 17 g, oral, Daily PRN, Bere Fabiana, APRN-CNP    simvastatin (Zocor) tablet 20 mg, 20 mg, oral, Nightly, Bere Fabiana, APRN-CNP, 20 mg at 03/01/25 2128    tamsulosin (Flomax) 24 hr capsule 0.4 mg, 0.4 mg, oral, Daily, Phyllis Jane MD, 0.4 mg at 03/02/25 1035       Physical Exam  HENT:      Head: Normocephalic.      Mouth/Throat:      Pharynx: Oropharynx is clear.   Eyes:       "Conjunctiva/sclera: Conjunctivae normal.   Cardiovascular:      Rate and Rhythm: Regular rhythm.   Pulmonary:      Breath sounds: Normal breath sounds.   Abdominal:      General: Bowel sounds are normal.      Palpations: Abdomen is soft.   Genitourinary:     Comments: Kiran in place  Musculoskeletal:         General: Normal range of motion.      Cervical back: Neck supple.   Skin:     General: Skin is warm and dry.   Neurological:      General: No focal deficit present.      Mental Status: He is alert.   Psychiatric:         Behavior: Behavior normal.           Last Recorded Vitals  Blood pressure 90/53, pulse 76, temperature 36.5 °C (97.7 °F), temperature source Temporal, resp. rate (!) 30, height 1.676 m (5' 6\"), weight 90.2 kg (198 lb 13.7 oz), SpO2 99%.  Intake/Output last 3 Shifts:  I/O last 3 completed shifts:  In: 1250 (13.9 mL/kg) [P.O.:600; I.V.:600 (6.7 mL/kg); IV Piggyback:50]  Out: 4350 (48.2 mL/kg) [Urine:4350 (1.3 mL/kg/hr)]  Weight: 90.2 kg     Labs:       Results for orders placed or performed during the hospital encounter of 02/27/25 (from the past 24 hours)   Basic metabolic panel   Result Value Ref Range    Glucose 122 (H) 74 - 99 mg/dL    Sodium 137 136 - 145 mmol/L    Potassium 4.1 3.5 - 5.3 mmol/L    Chloride 107 98 - 107 mmol/L    Bicarbonate 20 (L) 21 - 32 mmol/L    Anion Gap 14 10 - 20 mmol/L    Urea Nitrogen 50 (H) 6 - 23 mg/dL    Creatinine 1.87 (H) 0.50 - 1.30 mg/dL    eGFR 37 (L) >60 mL/min/1.73m*2    Calcium 10.0 8.6 - 10.3 mg/dL   CBC   Result Value Ref Range    WBC 8.5 4.4 - 11.3 x10*3/uL    nRBC 0.0 0.0 - 0.0 /100 WBCs    RBC 3.06 (L) 4.50 - 5.90 x10*6/uL    Hemoglobin 8.7 (L) 13.5 - 17.5 g/dL    Hematocrit 28.9 (L) 41.0 - 52.0 %    MCV 94 80 - 100 fL    MCH 28.4 26.0 - 34.0 pg    MCHC 30.1 (L) 32.0 - 36.0 g/dL    RDW 12.6 11.5 - 14.5 %    Platelets 259 150 - 450 x10*3/uL   Magnesium   Result Value Ref Range    Magnesium 1.68 1.60 - 2.40 mg/dL              Assessment/Plan "   Assessment & Plan  MOJGAN (acute kidney injury) (CMS-[facility])     UTI (urinary tract infection)       Hypermagnesemia     Hypercalcemia     Elevated troponin     Enlarged prostate         PLAN: Patient remains on IV CTX, Kiran catheter in place, once it to be removed, urology on board, monitor and correct electrolytes, med list and labs reviewed, for now continue with current Rx, discussed with nursing, if stable consider for DC in a day or 2.            Luis Fernando Rowe MD

## 2025-03-02 NOTE — PROGRESS NOTES
INPATIENT NEPHROLOGY CONSULT PROGRESS NOTE      Patient Name: Wisam Austin MRN: [id number]  DATE of SERVICE: March 2, 2025  TIME of SERVICE: 11:02 AM  CONSULTING SERVICE: Nephrology    REASON for CONSULT: Acute kidney injury    SUBJECTIVE:  Seen and evaluated at bedside, awake.  Urinary spasm improved with Flomax  Denies uremic symptoms. denies nausea, vomiting.  Kiran catheter in place, urine output encouraging 3.3 L for the past 24 hours  Serum creatinine down to 1.8 from 7.9 with a GFR up to 37 mL/min    SUMMARY OF STAY:  Mr. Austin is a 75 y.o. male who presented to [facility] January 27, 2025 as a direct admit from urology office for worsening outpatient labs and hypotension.    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  Patient sent to the emergency department after his evaluation at urology office urology office due to profound hypotension and acute kidney injury.  Patient seen and evaluated at bedside, awake however has a poor insight into his medical condition.  Patient is a poor historian.  He does not believe that anything is wrong with him.  Kiran catheter inserted in the emergency department with dark urine in the bag.  Patient reports mild discomfort from Kiran catheter.    ASSESSMENT:  Severe acute kidney injury likely ischemic and infectious related ATN with acute uremia:  -- urinalysis suggestive of urinary tract infection urine micro positive for granular casts and hyaline cast the findings more suggestive of ATN.   -- IV antibiotics: on IV ceftriaxone 2 g  -- Kiran catheter inserted in the emergency department  -- Scr gradually improving  -- CT abdomen pelvis ordered, reviewed.  --Recovering, postobstructive diuresis     Sepsis, urinary tract infection, suspected prostatitis versus malignancy.   -- IV ceftriaxone     Hypercalcemia calcium 10.9--> 9.7 likely secondary to dehydration versus possible  underlying prostate CA     Hyperkalemia admission potassium 5.3-- > 4.1 improving  Hyponatremia sodium 134--> 138 likely hypovolemic  Uremic acidemia, metabolic acidosis: Corrected with sodium bicarb and infusion    PLAN:  Ischemic and infectious related ATN with acute uremia responding to medical management:    1.  ATN with postobstructive diuresis, maintaining euvolemic status, hydrochlorothiazide remains on hold.  2.  Kiran catheter in place, reports some relief from Flomax added for urinary spasm  3.  To continue IV ceftriaxone.  4.   Hypercalcemia responded to IV hydration with repeat calcium within acceptable range  5.   Hyperkalemia improved with correcting metabolic acidosis, corrected  6.   To cont renal diet for 1 more day then to switch to regular  7.  Medication reviewed, renally adjusted  8.  Hypotension to hold amlodipine, hydrochlorothiazide.  Blood pressure stays relatively low 102/59 today  9.  To minimize narcotics due to acute kidney injury     Will follow, thank you!    Medications:    Current Facility-Administered Medications:     acetaminophen (Tylenol) tablet 650 mg, 650 mg, oral, q6h PRN **OR** acetaminophen (Tylenol) oral liquid 650 mg, 650 mg, oral, q6h PRN **OR** acetaminophen (Tylenol) suppository 650 mg, 650 mg, rectal, q6h PRN, Bere Correai APRN-CNP    amLODIPine (Norvasc) tablet 10 mg, 10 mg, oral, Daily, Bere Fabiana APRN-CNP, 10 mg at 03/01/25 0934    cefTRIAXone (Rocephin) 1 g in sodium chloride 0.9% IV 50 mL, 1 g, intravenous, q24h, Bere Jung APRN-CNP, Stopped at 03/01/25 2015    [Held by provider] dorzolamide-timoloL (Cosopt) 22.3-6.8 mg/mL ophthalmic solution 1 drop, 1 drop, Both Eyes, BID, Bere Jung APRN-CNP    heparin (porcine) injection 5,000 Units, 5,000 Units, subcutaneous, q8h, Bere Fabiana, APRN-CNP, 5,000 Units at 03/02/25 1037    [Held by provider] hydroCHLOROthiazide (Microzide) tablet 12.5 mg, 12.5 mg, oral, Daily, Bere Correai, APRN-CNP    latanoprost (Xalatan)  0.005 % ophthalmic solution 1 drop, 1 drop, Both Eyes, Nightly, Bere Fabiana, APRN-CNP, 1 drop at 03/01/25 2128    [Held by provider] lisinopril tablet 20 mg, 20 mg, oral, Daily, Bere Fabiana, APRN-CNP    melatonin tablet 3 mg, 3 mg, oral, Nightly PRN, Bere Fabiana, APRN-CNP, 3 mg at 03/01/25 2207    oxybutynin (Ditropan) tablet 5 mg, 5 mg, oral, TID, Heather R Crowder, APRN-CNP, 5 mg at 03/02/25 1035    polyethylene glycol (Glycolax, Miralax) packet 17 g, 17 g, oral, Daily PRN, Bere Fabiana, APRN-CNP    simvastatin (Zocor) tablet 20 mg, 20 mg, oral, Nightly, Bere Fabiana, APRN-CNP, 20 mg at 03/01/25 2128    tamsulosin (Flomax) 24 hr capsule 0.4 mg, 0.4 mg, oral, Daily, Phyllis Jane MD, 0.4 mg at 03/02/25 1035    PERTINENT ROS:  GENERAL:  positive for fatigue, poor appetite.  No fever/chills  RESPIRATORY:  positive for shortness of breath.  Negative for cough, wheezing.  CARDIOVASCULAR:   Negative for chest pain or palpitation.  GI:  Negative for abdominal pain, diarrhea, heartburn, nausea, vomiting  : Indwelling Kiran catheter in place    Physical Exam:  Vital signs in last 24 hours:  Temp:  [35.9 °C (96.6 °F)-36.5 °C (97.7 °F)] 36.3 °C (97.3 °F)  Heart Rate:  [62-92] 62  Resp:  [16-24] 18  BP: ()/(55-71) 102/59    General: Awake, -American male, slow to response  HEENT:  NCAT,  mucous membranes moist and pink  NECK:  Elevated JVD, no carotid bruit, supple, no cervical mass or thyromegaly  LUNGS;  Diminished breath sounds, no Rales  CV:  Distant, regular rate and rhythm, no murmurs  ABDOMEN:  abdomen soft, nontender, BS normal, no masses or organomegaly  EDEMA: No lower extremity edema/dependent edema  SKIN:  dry and normal turgor, no clubbing, cyanosis or petechia.  No rashes noted  : Kiran catheter in place    Intake/Output last 3 shifts:  I/O last 3 completed shifts:  In: 1250 (13.9 mL/kg) [P.O.:600; I.V.:600 (6.7 mL/kg); IV Piggyback:50]  Out: 4350 (48.2 mL/kg) [Urine:4350 (1.3  mL/kg/hr)]  Weight: 90.2 kg     DATA:  Diagnotic tests reviewed for Todays visit:  Results from last 7 days   Lab Units 03/02/25  0539   WBC AUTO x10*3/uL 8.5   RBC AUTO x10*6/uL 3.06*   HEMOGLOBIN g/dL 8.7*   HEMATOCRIT % 28.9*     Results from last 7 days   Lab Units 03/02/25  0539 02/28/25  0718 02/27/25  1653   SODIUM mmol/L 137   < > 134*   POTASSIUM mmol/L 4.1   < > 5.3   CHLORIDE mmol/L 107   < > 97*   CO2 mmol/L 20*   < > 21   BUN mg/dL 50*   < > 134*   CREATININE mg/dL 1.87*   < > 7.86*   CALCIUM mg/dL 10.0   < > 10.9*   MAGNESIUM mg/dL 1.68   < > 3.07*   BILIRUBIN TOTAL mg/dL  --   --  0.8   ALT U/L  --   --  10   AST U/L  --   --  15    < > = values in this interval not displayed.     Results from last 7 days   Lab Units 02/27/25  1916   COLOR U  Yellow   APPEARANCE U  Turbid*   PH U  5.0   SPEC GRAV UR  1.019   PROTEIN U mg/dL 30 (1+)*   BLOOD UR mg/dL NEGATIVE   NITRITE U  NEGATIVE   WBC UR /HPF 21-50*   BACTERIA UR /HPF 1+*     IMAGING: CXR reviewed in  images      SIGNATURE: Phyllis Jane MD  Nephrology and Hypertension  [address] [place]  Office phone: [phone]  FAX: [phone]    This note was partially generated using the Dragon voice recognition system, and there may be some incorrect words, spelling's and punctuation that were not noted in checking the note before saving.

## 2025-03-03 ENCOUNTER — APPOINTMENT (OUTPATIENT)
Dept: CARDIOLOGY | Facility: HOSPITAL | Age: 75
DRG: 871 | End: 2025-03-03
Payer: MEDICARE

## 2025-03-03 VITALS
OXYGEN SATURATION: 97 % | HEIGHT: 66 IN | DIASTOLIC BLOOD PRESSURE: 59 MMHG | WEIGHT: 198.85 LBS | HEART RATE: 86 BPM | SYSTOLIC BLOOD PRESSURE: 105 MMHG | RESPIRATION RATE: 19 BRPM | TEMPERATURE: 98.2 F | BODY MASS INDEX: 31.96 KG/M2

## 2025-03-03 PROBLEM — R79.89 ELEVATED TROPONIN: Status: RESOLVED | Noted: 2025-02-28 | Resolved: 2025-03-03

## 2025-03-03 PROBLEM — E83.42 HYPOMAGNESEMIA: Status: ACTIVE | Noted: 2025-02-27

## 2025-03-03 PROBLEM — I48.91 ATRIAL FIBRILLATION WITH RVR (MULTI): Status: ACTIVE | Noted: 2025-03-03

## 2025-03-03 LAB
ANION GAP SERPL CALC-SCNC: 13 MMOL/L (ref 10–20)
AORTIC VALVE PEAK VELOCITY: 1.34 M/S
AV PEAK GRADIENT: 7 MMHG
AVA (PEAK VEL): 2.62 CM2
BUN SERPL-MCNC: 31 MG/DL (ref 6–23)
CALCIUM SERPL-MCNC: 10.5 MG/DL (ref 8.6–10.3)
CHLORIDE SERPL-SCNC: 103 MMOL/L (ref 98–107)
CO2 SERPL-SCNC: 20 MMOL/L (ref 21–32)
CREAT SERPL-MCNC: 1.54 MG/DL (ref 0.5–1.3)
EGFRCR SERPLBLD CKD-EPI 2021: 47 ML/MIN/1.73M*2
EJECTION FRACTION APICAL 4 CHAMBER: 44.3
EJECTION FRACTION: 50 %
ERYTHROCYTE [DISTWIDTH] IN BLOOD BY AUTOMATED COUNT: 12.3 % (ref 11.5–14.5)
GLUCOSE SERPL-MCNC: 109 MG/DL (ref 74–99)
HCT VFR BLD AUTO: 29.9 % (ref 41–52)
HGB BLD-MCNC: 9 G/DL (ref 13.5–17.5)
LEFT VENTRICLE INTERNAL DIMENSION DIASTOLE: 5.46 CM (ref 3.5–6)
LEFT VENTRICULAR OUTFLOW TRACT DIAMETER: 2.35 CM
LV EJECTION FRACTION BIPLANE: 43 %
MAGNESIUM SERPL-MCNC: 1.4 MG/DL (ref 1.6–2.4)
MCH RBC QN AUTO: 27.9 PG (ref 26–34)
MCHC RBC AUTO-ENTMCNC: 30.1 G/DL (ref 32–36)
MCV RBC AUTO: 93 FL (ref 80–100)
MITRAL VALVE E/A RATIO: 0.55
NRBC BLD-RTO: 0 /100 WBCS (ref 0–0)
PLATELET # BLD AUTO: 265 X10*3/UL (ref 150–450)
POTASSIUM SERPL-SCNC: 3.8 MMOL/L (ref 3.5–5.3)
RBC # BLD AUTO: 3.23 X10*6/UL (ref 4.5–5.9)
RIGHT VENTRICLE FREE WALL PEAK S': 17 CM/S
SODIUM SERPL-SCNC: 132 MMOL/L (ref 136–145)
T4 FREE SERPL-MCNC: 1.07 NG/DL (ref 0.61–1.12)
TRICUSPID ANNULAR PLANE SYSTOLIC EXCURSION: 1.8 CM
TSH SERPL-ACNC: 0.33 MIU/L (ref 0.44–3.98)
WBC # BLD AUTO: 9.1 X10*3/UL (ref 4.4–11.3)

## 2025-03-03 PROCEDURE — 93306 TTE W/DOPPLER COMPLETE: CPT | Performed by: INTERNAL MEDICINE

## 2025-03-03 PROCEDURE — 2060000001 HC INTERMEDIATE ICU ROOM DAILY

## 2025-03-03 PROCEDURE — 2500000001 HC RX 250 WO HCPCS SELF ADMINISTERED DRUGS (ALT 637 FOR MEDICARE OP)

## 2025-03-03 PROCEDURE — 51702 INSERT TEMP BLADDER CATH: CPT

## 2025-03-03 PROCEDURE — 84439 ASSAY OF FREE THYROXINE: CPT

## 2025-03-03 PROCEDURE — 2500000002 HC RX 250 W HCPCS SELF ADMINISTERED DRUGS (ALT 637 FOR MEDICARE OP, ALT 636 FOR OP/ED): Performed by: INTERNAL MEDICINE

## 2025-03-03 PROCEDURE — 99222 1ST HOSP IP/OBS MODERATE 55: CPT

## 2025-03-03 PROCEDURE — 93005 ELECTROCARDIOGRAM TRACING: CPT

## 2025-03-03 PROCEDURE — 93306 TTE W/DOPPLER COMPLETE: CPT

## 2025-03-03 PROCEDURE — 2500000004 HC RX 250 GENERAL PHARMACY W/ HCPCS (ALT 636 FOR OP/ED)

## 2025-03-03 PROCEDURE — 84443 ASSAY THYROID STIM HORMONE: CPT

## 2025-03-03 PROCEDURE — 2500000002 HC RX 250 W HCPCS SELF ADMINISTERED DRUGS (ALT 637 FOR MEDICARE OP, ALT 636 FOR OP/ED)

## 2025-03-03 PROCEDURE — 83735 ASSAY OF MAGNESIUM: CPT

## 2025-03-03 PROCEDURE — 2500000004 HC RX 250 GENERAL PHARMACY W/ HCPCS (ALT 636 FOR OP/ED): Performed by: INTERNAL MEDICINE

## 2025-03-03 PROCEDURE — 2500000002 HC RX 250 W HCPCS SELF ADMINISTERED DRUGS (ALT 637 FOR MEDICARE OP, ALT 636 FOR OP/ED): Performed by: NURSE PRACTITIONER

## 2025-03-03 PROCEDURE — 93010 ELECTROCARDIOGRAM REPORT: CPT | Performed by: INTERNAL MEDICINE

## 2025-03-03 PROCEDURE — 85027 COMPLETE CBC AUTOMATED: CPT

## 2025-03-03 PROCEDURE — 80048 BASIC METABOLIC PNL TOTAL CA: CPT

## 2025-03-03 PROCEDURE — 36415 COLL VENOUS BLD VENIPUNCTURE: CPT

## 2025-03-03 RX ORDER — SIMVASTATIN 40 MG/1
40 TABLET, FILM COATED ORAL NIGHTLY
Status: DISCONTINUED | OUTPATIENT
Start: 2025-03-03 | End: 2025-03-04 | Stop reason: HOSPADM

## 2025-03-03 RX ORDER — MAGNESIUM SULFATE HEPTAHYDRATE 40 MG/ML
2 INJECTION, SOLUTION INTRAVENOUS ONCE
Status: COMPLETED | OUTPATIENT
Start: 2025-03-03 | End: 2025-03-03

## 2025-03-03 RX ORDER — METOPROLOL TARTRATE 25 MG/1
12.5 TABLET, FILM COATED ORAL 2 TIMES DAILY
Status: DISCONTINUED | OUTPATIENT
Start: 2025-03-03 | End: 2025-03-04 | Stop reason: HOSPADM

## 2025-03-03 RX ORDER — POTASSIUM CHLORIDE 20 MEQ/1
20 TABLET, EXTENDED RELEASE ORAL ONCE
Status: COMPLETED | OUTPATIENT
Start: 2025-03-03 | End: 2025-03-03

## 2025-03-03 RX ORDER — NAPROXEN SODIUM 220 MG/1
81 TABLET, FILM COATED ORAL DAILY
Status: DISCONTINUED | OUTPATIENT
Start: 2025-03-03 | End: 2025-03-04 | Stop reason: HOSPADM

## 2025-03-03 RX ADMIN — METOPROLOL TARTRATE 12.5 MG: 25 TABLET, FILM COATED ORAL at 14:30

## 2025-03-03 RX ADMIN — SIMVASTATIN 40 MG: 40 TABLET, FILM COATED ORAL at 20:24

## 2025-03-03 RX ADMIN — OXYBUTYNIN CHLORIDE 5 MG: 5 TABLET ORAL at 08:47

## 2025-03-03 RX ADMIN — LATANOPROST 1 DROP: 50 SOLUTION OPHTHALMIC at 20:25

## 2025-03-03 RX ADMIN — TAMSULOSIN HYDROCHLORIDE 0.4 MG: 0.4 CAPSULE ORAL at 08:47

## 2025-03-03 RX ADMIN — ASPIRIN 81 MG CHEWABLE TABLET 81 MG: 81 TABLET CHEWABLE at 14:30

## 2025-03-03 RX ADMIN — HEPARIN SODIUM 5000 UNITS: 5000 INJECTION, SOLUTION INTRAVENOUS; SUBCUTANEOUS at 04:31

## 2025-03-03 RX ADMIN — OXYBUTYNIN CHLORIDE 5 MG: 5 TABLET ORAL at 20:24

## 2025-03-03 RX ADMIN — MAGNESIUM SULFATE HEPTAHYDRATE 2 G: 40 INJECTION, SOLUTION INTRAVENOUS at 08:49

## 2025-03-03 RX ADMIN — OXYBUTYNIN CHLORIDE 5 MG: 5 TABLET ORAL at 16:17

## 2025-03-03 RX ADMIN — AMLODIPINE BESYLATE 10 MG: 10 TABLET ORAL at 08:47

## 2025-03-03 RX ADMIN — POTASSIUM CHLORIDE 20 MEQ: 1500 TABLET, EXTENDED RELEASE ORAL at 08:47

## 2025-03-03 ASSESSMENT — PAIN - FUNCTIONAL ASSESSMENT
PAIN_FUNCTIONAL_ASSESSMENT: 0-10

## 2025-03-03 ASSESSMENT — PAIN SCALES - GENERAL
PAINLEVEL_OUTOF10: 0 - NO PAIN

## 2025-03-03 ASSESSMENT — COGNITIVE AND FUNCTIONAL STATUS - GENERAL
TURNING FROM BACK TO SIDE WHILE IN FLAT BAD: A LITTLE
MOBILITY SCORE: 16
HELP NEEDED FOR BATHING: A LITTLE
DRESSING REGULAR LOWER BODY CLOTHING: A LITTLE
WALKING IN HOSPITAL ROOM: A LOT
TOILETING: A LITTLE
PERSONAL GROOMING: A LITTLE
MOVING TO AND FROM BED TO CHAIR: A LITTLE
DRESSING REGULAR UPPER BODY CLOTHING: A LITTLE
MOVING FROM LYING ON BACK TO SITTING ON SIDE OF FLAT BED WITH BEDRAILS: A LITTLE
STANDING UP FROM CHAIR USING ARMS: A LITTLE
DAILY ACTIVITIY SCORE: 19
CLIMB 3 TO 5 STEPS WITH RAILING: A LOT

## 2025-03-03 NOTE — PROGRESS NOTES
INPATIENT NEPHROLOGY CONSULT PROGRESS NOTE      Patient Name: Wisam Austin MRN: [id number]  DATE of SERVICE: March 3, 2025  TIME of SERVICE: 10:37 AM  CONSULTING SERVICE: Nephrology    REASON for CONSULT: Acute kidney injury    SUBJECTIVE:  Seen and evaluated at bedside, awake.  Feeling better sitting in the chair.  Kiran catheter in place serum creatinine down to 1.5  Urine output about 2.3 L.    SUMMARY OF STAY:  Mr. Austin is a 75 y.o. male who presented to [facility] January 27, 2025 as a direct admit from urology office for worsening outpatient labs and hypotension.    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  Patient sent to the emergency department after his evaluation at urology office urology office due to profound hypotension and acute kidney injury.  Patient seen and evaluated at bedside, awake however has a poor insight into his medical condition.  Patient is a poor historian.  He does not believe that anything is wrong with him.  Kiran catheter inserted in the emergency department with dark urine in the bag.  Patient reports mild discomfort from Kiran catheter.    ASSESSMENT:  Severe acute kidney injury likely ischemic and infectious related ATN with acute uremia:  -- urinalysis suggestive of urinary tract infection urine micro positive for granular casts and hyaline cast the findings more suggestive of ATN.   -- IV antibiotics: on IV ceftriaxone 2 g  -- Kiran catheter inserted in the emergency department  -- Scr gradually improving  -- CT abdomen pelvis ordered, reviewed.  --Recovering, postobstructive diuresis  --Improving     Sepsis, urinary tract infection, suspected prostatitis versus malignancy.   -- IV ceftriaxone     Hypercalcemia calcium 10.9--> 9.7 likely secondary to dehydration versus possible underlying prostate CA     Hyperkalemia admission potassium 5.3-- > 4.1 improving  Hyponatremia sodium  134--> 138 likely hypovolemic  Uremic acidemia, metabolic acidosis: Corrected with sodium bicarb and infusion    PLAN:  Ischemic and infectious related ATN with acute uremia responding to medical management:    1.  Approved for Kiran catheter removal by urology as well  2.  ATN with postobstructive diuresis, maintaining euvolemic status, hydrochlorothiazide remains on hold.  3.  To continue IV ceftriaxone.  4.   Hypercalcemia, suspected underlying prostate CA.  Further workup as an outpatient especially as patient will be off hydrochlorothiazide  5.   Hyperkalemia improved with correcting metabolic acidosis, corrected  6.   Diet can be switched to regular  7.  Medication reviewed, renally adjusted  8.  Hypotension to hold amlodipine, hydrochlorothiazide.  Blood pressure remains on the lower side.       Will follow, thank you!    Medications:    Current Facility-Administered Medications:     acetaminophen (Tylenol) tablet 650 mg, 650 mg, oral, q6h PRN **OR** acetaminophen (Tylenol) oral liquid 650 mg, 650 mg, oral, q6h PRN **OR** acetaminophen (Tylenol) suppository 650 mg, 650 mg, rectal, q6h PRN, Bere Fabiana, APRN-CNP    amLODIPine (Norvasc) tablet 10 mg, 10 mg, oral, Daily, Bere Fabiana, APRN-CNP, 10 mg at 03/03/25 0847    aspirin chewable tablet 81 mg, 81 mg, oral, Daily, Yohannesbutch Pedrazaj, DO, 81 mg at 03/03/25 1430    [Held by provider] dorzolamide-timoloL (Cosopt) 22.3-6.8 mg/mL ophthalmic solution 1 drop, 1 drop, Both Eyes, BID, Bere Correai, APRN-CNP    heparin (porcine) injection 5,000 Units, 5,000 Units, subcutaneous, q8h, Bere Correai, APRN-CNP, 5,000 Units at 03/03/25 0431    [Held by provider] hydroCHLOROthiazide (Microzide) tablet 12.5 mg, 12.5 mg, oral, Daily, Bere Fabiana, APRN-CNP    latanoprost (Xalatan) 0.005 % ophthalmic solution 1 drop, 1 drop, Both Eyes, Nightly, Bere Jung APRN-CNP, 1 drop at 03/02/25 2039    [Held by provider] lisinopril tablet 20 mg, 20 mg, oral, Daily, Bere Jung, APRN-CNP     melatonin tablet 3 mg, 3 mg, oral, Nightly PRN, Bere Jung, APRN-CNP, 3 mg at 03/01/25 2207    metoprolol tartrate (Lopressor) tablet 12.5 mg, 12.5 mg, oral, BID, Yohannes Fuentes DO, 12.5 mg at 03/03/25 1430    oxybutynin (Ditropan) tablet 5 mg, 5 mg, oral, TID, Heather Crowder APRN-CNP, 5 mg at 03/03/25 0847    polyethylene glycol (Glycolax, Miralax) packet 17 g, 17 g, oral, Daily PRN, Bere Jung, APRN-CNP    simvastatin (Zocor) tablet 40 mg, 40 mg, oral, Nightly, Yohannes Fuentes DO    tamsulosin (Flomax) 24 hr capsule 0.4 mg, 0.4 mg, oral, Daily, Phyllis Jane MD, 0.4 mg at 03/03/25 0847    PERTINENT ROS:  GENERAL:  positive for fatigue, poor appetite.  No fever/chills  RESPIRATORY:  positive for shortness of breath.  Negative for cough, wheezing.  CARDIOVASCULAR:   Negative for chest pain or palpitation.  GI:  Negative for abdominal pain, diarrhea, heartburn, nausea, vomiting  : Indwelling Kiran catheter in place    Physical Exam:  Vital signs in last 24 hours:  Temp:  [36.5 °C (97.7 °F)-36.8 °C (98.2 °F)] 36.5 °C (97.7 °F)  Heart Rate:  [] 88  Resp:  [16-33] 20  BP: (100-131)/(55-86) 116/65    General: Awake, -American male, slow to response  HEENT:  NCAT,  mucous membranes moist and pink  NECK:  Elevated JVD, no carotid bruit, supple, no cervical mass or thyromegaly  LUNGS;  Diminished breath sounds, no Rales  CV:  Distant, regular rate and rhythm, no murmurs  ABDOMEN:  abdomen soft, nontender, BS normal, no masses or organomegaly  EDEMA: No lower extremity edema/dependent edema  SKIN:  dry and normal turgor, no clubbing, cyanosis or petechia.  No rashes noted  : Kiran catheter in place    Intake/Output last 3 shifts:  I/O last 3 completed shifts:  In: 990 (10.9 mL/kg) [P.O.:940; IV Piggyback:50]  Out: 4200 (46.1 mL/kg) [Urine:4200 (1.3 mL/kg/hr)]  Weight: 91.2 kg     DATA:  Diagnotic tests reviewed for Todays visit:  Results from last 7 days   Lab Units 03/03/25  0432   WBC AUTO  x10*3/uL 9.1   RBC AUTO x10*6/uL 3.23*   HEMOGLOBIN g/dL 9.0*   HEMATOCRIT % 29.9*     Results from last 7 days   Lab Units 03/03/25  0432 02/28/25  0718 02/27/25  1653   SODIUM mmol/L 132*   < > 134*   POTASSIUM mmol/L 3.8   < > 5.3   CHLORIDE mmol/L 103   < > 97*   CO2 mmol/L 20*   < > 21   BUN mg/dL 31*   < > 134*   CREATININE mg/dL 1.54*   < > 7.86*   CALCIUM mg/dL 10.5*   < > 10.9*   MAGNESIUM mg/dL 1.40*   < > 3.07*   BILIRUBIN TOTAL mg/dL  --   --  0.8   ALT U/L  --   --  10   AST U/L  --   --  15    < > = values in this interval not displayed.     Results from last 7 days   Lab Units 02/27/25  1916   COLOR U  Yellow   APPEARANCE U  Turbid*   PH U  5.0   SPEC GRAV UR  1.019   PROTEIN U mg/dL 30 (1+)*   BLOOD UR mg/dL NEGATIVE   NITRITE U  NEGATIVE   WBC UR /HPF 21-50*   BACTERIA UR /HPF 1+*     IMAGING: CXR reviewed in  images      SIGNATURE: Phyllis Jane MD  Nephrology and Hypertension  [address] [place]  Office phone: [phone]  FAX: [phone]    This note was partially generated using the Dragon voice recognition system, and there may be some incorrect words, spelling's and punctuation that were not noted in checking the note before saving.

## 2025-03-03 NOTE — CARE PLAN
The patient's goals for the shift include  have marvin catheter removed    The clinical goals for the shift include patient will remain hemodynamically stable throughout shift    Patient marvin catheter removed per order without difficulty, voiding clear yellow urine, PVR 50, no complaints of pain or urgency, up to BSC for BM, tachycardic with activity, cardiology on consult, pt started on metoprolol and baby asa, pt hopeful to go home soon, labs improving      Problem: Discharge Planning  Goal: Discharge to home or other facility with appropriate resources  Outcome: Progressing     Problem: Nutrition  Goal: Nutrient intake appropriate for maintaining nutritional needs  Outcome: Progressing     Problem: Acute Kidney Failure  Goal: Electrolytes/labs return to normal range  Outcome: Progressing  Goal: Tolerates renal replacement therapy  Outcome: Progressing  Goal: Wean vasopressor/achieve hemodynamic stability  Outcome: Progressing     Problem: Infective UTI/pyelonephritis  Goal: Follows fluid restrictions or becomes normovolemic  Outcome: Progressing

## 2025-03-03 NOTE — PROGRESS NOTES
Internal Medicine Progress Note    Patient Name: Wisam Austin          MRN: [id number]  Today's Date: March 3, 2025          Attending: Jose Velazco MD    Subjective:  Patient was seen and examined at bedside, patient denies chest pain, shortness quadrant pain, nausea or vomiting, today morning patient went into A-fib with RVR.    Review Of Systems:  GENERAL: No malaise or fevers.  CARDIOVASCULAR: Negative for chest pain, leg swelling  RESPIRATORY: Negative for shortness of breath, cough, wheezing  GI: No nausea, vomiting, or diarrhea  MUSCULOSKELETAL: Negative for joint pain or swelling    Objective:  Vitals:    03/03/25 0429 03/03/25 0533 03/03/25 0600 03/03/25 0800   BP: 100/55      BP Location: Right arm      Patient Position: Lying      Pulse: 86  80 92   Resp: 22  19 19   Temp: 36.8 °C (98.2 °F)      TempSrc: Temporal      SpO2: 98%      Weight:  91.2 kg (201 lb)     Height:               Physical Exam:   General appearance: Well-appearing alert, in no acute distress   Lungs: Clear to auscultation, no wheezing or rhonchi  Heart: Irregularly irregular tachycardia  Abdomen: Soft, non-tender. Bowel sounds normal.  Extremities: No deformity, no edema  Peripheral pulses: Normal  Neuro: Alert oriented x3, no focal deficit.    Labs:  Results for orders placed or performed during the hospital encounter of 02/27/25 (from the past 24 hours)   Basic metabolic panel   Result Value Ref Range    Glucose 109 (H) 74 - 99 mg/dL    Sodium 132 (L) 136 - 145 mmol/L    Potassium 3.8 3.5 - 5.3 mmol/L    Chloride 103 98 - 107 mmol/L    Bicarbonate 20 (L) 21 - 32 mmol/L    Anion Gap 13 10 - 20 mmol/L    Urea Nitrogen 31 (H) 6 - 23 mg/dL    Creatinine 1.54 (H) 0.50 - 1.30 mg/dL    eGFR 47 (L) >60 mL/min/1.73m*2    Calcium 10.5 (H) 8.6 - 10.3 mg/dL   CBC   Result Value Ref Range    WBC 9.1 4.4 - 11.3 x10*3/uL    nRBC 0.0 0.0 - 0.0 /100 WBCs    RBC 3.23 (L) 4.50 - 5.90 x10*6/uL    Hemoglobin 9.0 (L) 13.5 - 17.5 g/dL    Hematocrit 29.9 (L)  "41.0 - 52.0 %    MCV 93 80 - 100 fL    MCH 27.9 26.0 - 34.0 pg    MCHC 30.1 (L) 32.0 - 36.0 g/dL    RDW 12.3 11.5 - 14.5 %    Platelets 265 150 - 450 x10*3/uL   Magnesium   Result Value Ref Range    Magnesium 1.40 (L) 1.60 - 2.40 mg/dL       Medications:  Scheduled medications  amLODIPine, 10 mg, oral, Daily  cefTRIAXone, 1 g, intravenous, q24h  [Held by provider] dorzolamide-timoloL, 1 drop, Both Eyes, BID  heparin (porcine), 5,000 Units, subcutaneous, q8h  [Held by provider] hydroCHLOROthiazide, 12.5 mg, oral, Daily  latanoprost, 1 drop, Both Eyes, Nightly  [Held by provider] lisinopril, 20 mg, oral, Daily  magnesium sulfate, 2 g, intravenous, Once  oxybutynin, 5 mg, oral, TID  potassium chloride CR, 20 mEq, oral, Once  simvastatin, 20 mg, oral, Nightly  tamsulosin, 0.4 mg, oral, Daily      Continuous medications     PRN medications  PRN medications: acetaminophen **OR** acetaminophen **OR** acetaminophen, melatonin, polyethylene glycol      Assessment/Plan:  Assessment & Plan  MOJGAN (acute kidney injury) (CMS-[facility])  Improving  Serum creatinine 1.54  Patient still has Kiran catheter  Continue monitoring  UTI (urinary tract infection)  Patient had abnormal UA on admission and was started on Rocephin  Urine culture was negative  Will discontinue antibiotics    Hypomagnesemia  We will replete with magnesium sulfate  Hypercalcemia  Continue monitoring  Elevated troponin (Resolved: 3/3/2025)     Enlarged prostate  Urology is following      Atrial fibrillation with RVR (Multi)  Patient is initially with heart rate in the 30s  We will replete magnesium  Consult cardiology    Discussed with patient, RN    Jose Velazco MD   Date: 03/03/25  Time: 8:48 AM    This note was partially created using voice recognition software and is inherently subject to errors including those of syntax and \"sound-alike\" substitutions which may escape proofreading. In such instances, original meaning may be extrapolated by contextual " derivation

## 2025-03-03 NOTE — CARE PLAN
The patient's goals for the shift include      The clinical goals for the shift include Patient will remain afebrile through shift. Goal met. Patient afebrile this shift. Patient marvin catheter maintained and draining clear, yellow urine. Patient denies any pain at this time. Patient has remained NSR on telemetry. Patient safety maintained. Bed in low/locked position, bed alarm on. Call light within reach.    Problem: Pain - Adult  Goal: Verbalizes/displays adequate comfort level or baseline comfort level  Outcome: Met     Problem: Discharge Planning  Goal: Discharge to home or other facility with appropriate resources  Outcome: Progressing     Problem: Nutrition  Goal: Nutrient intake appropriate for maintaining nutritional needs  Outcome: Not Progressing     Problem: Skin  Goal: Prevent/manage excess moisture  Outcome: Met  Goal: Promote skin healing  Outcome: Met     Problem: Acute Kidney Failure  Goal: Electrolytes/labs return to normal range  Outcome: Progressing  Goal: No signs of infection  Outcome: Met     Problem: Infective UTI/pyelonephritis  Goal: Follows fluid restrictions or becomes normovolemic  Outcome: Progressing  Goal: Manages/understands urgency, continence  Outcome: Met  Goal: Stable weight and I&O  Outcome: Met       Over the shift, the patient did not make progress toward the following goals. Barriers to progression include poor appetite. Recommendations to address these barriers include provide magic cups, per patient's request.    Problem: Nutrition  Goal: Nutrient intake appropriate for maintaining nutritional needs  Outcome: Not Progressing

## 2025-03-03 NOTE — ASSESSMENT & PLAN NOTE
Patient had abnormal UA on admission and was started on Rocephin  Urine culture was negative  Will discontinue antibiotics

## 2025-03-03 NOTE — CONSULTS
"Inpatient consult to Cardiology  Consult performed by: Chivo Talley MD  Consult ordered by: Heather Crowder, APRN-CNP  Reason for consult: afib w RVR        History Of Present Illness  Wisam Austni is a 75 y.o. male presenting with HTN, HLD initially admitted for MOJGAN and UTI. Patient reportedly went into Afib with RVR this morning. Review of telemetry shows patient has PVCs with bigeminy. Denies chest pain, heart palpitations, shortness of breath. Multiple electrolyte abnormalities this AM. Does not follow with a cardiologist, denies cardiac history.     Troponin trend this admission 51->63->41     Past Medical History  He has a past medical history of Cataract, Colon polyps, COVID-19, Erectile dysfunction, Glaucoma, Hyperlipidemia, Hypertension, Myocardial infarction (Multi), Penile lesion, and Wears glasses.    Surgical History  He has a past surgical history that includes Cataract extraction; Colonoscopy; Other surgical history (2025); Circumcision, non-; and Trabeculectomy.     Social History  He reports that he has quit smoking. His smoking use included cigarettes. He has never used smokeless tobacco. He reports that he does not currently use alcohol. He reports that he does not currently use drugs.    Family History  Family History   Problem Relation Name Age of Onset    Cataracts Father      Glaucoma Father          Allergies  Apraclonidine and Brimonidine     Physical Exam  Physical Exam     Visit Vitals  /86 (BP Location: Right arm, Patient Position: Sitting)   Pulse 108   Temp 36.5 °C (97.7 °F) (Temporal)   Resp 25   Ht 1.676 m (5' 6\")   Wt 91.2 kg (201 lb)   SpO2 96%   BMI 32.44 kg/m²   Smoking Status Former   BSA 2.06 m²        General: NAD. NCAT.   Cardiovascular: Tachycardic, normal rhythm. No MRG. S1/S2 wnl.   Respiratory: CTABL. No acute respiratory distress.   MSK: ROM x 4. CTLS non-tender.   Extremities: No edema. Cap refill < 2 sec.   Skin: No rashes or bruises.   Neuro: Aox3. " Cranial Nerves grossly intact. Motor/sensory wnl.   Psych: Mood wnl.     Last Recorded Vitals  /55 (BP Location: Right arm, Patient Position: Lying)   Pulse 92   Temp 36.8 °C (98.2 °F) (Temporal)   Resp 19   Wt 91.2 kg (201 lb)   SpO2 98%     Relevant Results  Scheduled medications  amLODIPine, 10 mg, oral, Daily  cefTRIAXone, 1 g, intravenous, q24h  [Held by provider] dorzolamide-timoloL, 1 drop, Both Eyes, BID  heparin (porcine), 5,000 Units, subcutaneous, q8h  [Held by provider] hydroCHLOROthiazide, 12.5 mg, oral, Daily  latanoprost, 1 drop, Both Eyes, Nightly  [Held by provider] lisinopril, 20 mg, oral, Daily  magnesium sulfate, 2 g, intravenous, Once  oxybutynin, 5 mg, oral, TID  simvastatin, 20 mg, oral, Nightly  tamsulosin, 0.4 mg, oral, Daily      Continuous medications     PRN medications  PRN medications: acetaminophen **OR** acetaminophen **OR** acetaminophen, melatonin, polyethylene glycol  Results for orders placed or performed during the hospital encounter of 02/27/25 (from the past 24 hours)   Basic metabolic panel   Result Value Ref Range    Glucose 109 (H) 74 - 99 mg/dL    Sodium 132 (L) 136 - 145 mmol/L    Potassium 3.8 3.5 - 5.3 mmol/L    Chloride 103 98 - 107 mmol/L    Bicarbonate 20 (L) 21 - 32 mmol/L    Anion Gap 13 10 - 20 mmol/L    Urea Nitrogen 31 (H) 6 - 23 mg/dL    Creatinine 1.54 (H) 0.50 - 1.30 mg/dL    eGFR 47 (L) >60 mL/min/1.73m*2    Calcium 10.5 (H) 8.6 - 10.3 mg/dL   CBC   Result Value Ref Range    WBC 9.1 4.4 - 11.3 x10*3/uL    nRBC 0.0 0.0 - 0.0 /100 WBCs    RBC 3.23 (L) 4.50 - 5.90 x10*6/uL    Hemoglobin 9.0 (L) 13.5 - 17.5 g/dL    Hematocrit 29.9 (L) 41.0 - 52.0 %    MCV 93 80 - 100 fL    MCH 27.9 26.0 - 34.0 pg    MCHC 30.1 (L) 32.0 - 36.0 g/dL    RDW 12.3 11.5 - 14.5 %    Platelets 265 150 - 450 x10*3/uL   Magnesium   Result Value Ref Range    Magnesium 1.40 (L) 1.60 - 2.40 mg/dL     ECG 12 lead    Result Date: 2/28/2025  Normal sinus rhythm Normal ECG When compared  with ECG of 27-DEC-2024 10:59, Aberrant conduction is no longer Present Nonspecific T wave abnormality no longer evident in Inferior leads Nonspecific T wave abnormality no longer evident in Lateral leads    CT abdomen pelvis wo IV contrast    Result Date: 2/27/2025  STUDY: CT Abdomen and Pelvis without IV Contrast; 2/27/2025 at 9:54 PM. INDICATION: Severe MOJGAN, decreased urine output. COMPARISON: None available. ACCESSION NUMBER(S): [id number] ORDERING CLINICIAN: ADONIS MCKEON TECHNIQUE: CT of the abdomen and pelvis was performed.  Contiguous axial images were obtained at 3 mm slice thickness through the abdomen and pelvis. Coronal and sagittal reconstructions at 3 mm slice thickness were performed. No intravenous contrast was administered.  Automated mA/kV exposure control was utilized and patient examination was performed in strict accordance with principles of ALARA. FINDINGS: PLEASE NOTE THAT IN THE ABSENCE OF IV CONTRAST, SENSITIVITY FOR DETECTING VISCERAL AND VASCULAR ABNORMALITIES AS WELL AS INFLAMMATORY AND NEOPLASTIC CONDITIONS IS REDUCED. Imaged lower chest: No acute finding. There is coronary artery calcification, a marker for coronary atherosclerotic disease. Liver/gallbladder: Normal hepatic morphology. No suspicious space-occupying hepatic lesion. Gallbladder contains layering hyperdense material which is probably sludge. Adrenal: No adrenal nodule. Renal: No hydronephrosis. No suspicious renal lesion. No renal or ureteral calculus. Spleen: Normal in size. Pancreas: Unremarkable without surrounding infiltration or fluid. Vascular: Aorta and common iliac arteries normal in caliber. Urinary bladder: Decompressed. Reproductive organs: Enlarged and heterogeneous prostate gland measures 7.1 cm in transverse dimension. 3.4 x 2.2 cm low-density focus in the right aspect of the prostate gland image 142, series 201 is nonspecific. GI: No CT finding to suggest acute diverticulitis or colitis. No dilated small  bowel loops. Appendix normal. Other: No drainable fluid collection. No free air. Musculoskeletal: No suspicious osseous lesion. No acute fracture identified.    *Enlarged and heterogeneous prostate gland with 3.4 x 2.2 cm low-density focus in the right aspect of the prostate gland is nonspecific. Malignancy and infection would be on the differential. Correlate with PSA and WBC levels. *No hydronephrosis. No renal or ureteral calculus. *No CT finding to suggest acute diverticulitis or colitis. Appendix normal. *Coronary artery calcification, a marker for coronary atherosclerotic disease. Signed by Ladarius Bone MD    XR chest 1 view    Result Date: 2/27/2025  Interpreted By:  Bassam Leon, STUDY: XR CHEST 1 VIEW;  2/27/2025 5:52 pm   INDICATION: Signs/Symptoms:Chest Pain.     COMPARISON: 10/01/2019   ACCESSION NUMBER(S): [id number]   ORDERING CLINICIAN: ADONIS MCKEON   FINDINGS: Bronchial thickening is noted. There does appear to be slight fluid seen in the right fissure. No pneumothorax. No pneumothorax. No localizing infiltrate.       1.  No localizing infiltrate. Trace fluid seen in the right fissure.       MACRO: None   Signed by: Bassam Leon 2/27/2025 6:08 PM Dictation workstation:   [id number]     Assessment/Plan     Sinus tachycardia with PVCs - Agree with echocardiogram, treat underlying infection, fluids as tolerated  Elevated troponin - likely due to demand  3 run beat of Vtach - will begin beta blockade with metoprolol tartrate 12.5 mg BID, aspirin 81 mg, increased simvastatin to 40 mg daily.   Electrolyte derangement - goal Mg >2, P >3, K >4  HTN - well controlled  HLD - continue statin    Discussed with attending physician Dr. Gerri Fuentes D.O.  PGY-2 Internal medicine resident

## 2025-03-03 NOTE — PROGRESS NOTES
Spiritual Care Visit  Spiritual Care Request    Reason for Visit:        Request Received From:       Focus of Care:            Refer to :          Spiritual Care Assessment    Spiritual Assessment:                      Care Provided:  Intended Effects: Aligning care plan with patient's values, Build relationship of care and support, Convey a calming presence, Demonstrate caring and concern, Lessen someone's feelings of isolation, Lessen anxiety, Helping someone feel comforted, Brittani affirmation, Establish rapport and connectedness, Meaning-making, Preserve dignity and respect, Promote sense of peace    Sense of Community and or Advent Affiliation:  Scientologist         Addressed Needs/Concerns and/or Ibrahima Through:          Outcome:        Advance Directives:         Spiritual Care Annotation    Annotation:  Nice quick visit with patient - will come back later

## 2025-03-04 ENCOUNTER — APPOINTMENT (OUTPATIENT)
Dept: RADIOLOGY | Facility: HOSPITAL | Age: 75
DRG: 871 | End: 2025-03-04
Payer: MEDICARE

## 2025-03-04 ENCOUNTER — APPOINTMENT (OUTPATIENT)
Dept: CARDIOLOGY | Facility: HOSPITAL | Age: 75
DRG: 871 | End: 2025-03-04
Payer: MEDICARE

## 2025-03-04 ENCOUNTER — PHARMACY VISIT (OUTPATIENT)
Dept: PHARMACY | Facility: CLINIC | Age: 75
End: 2025-03-04
Payer: COMMERCIAL

## 2025-03-04 VITALS
HEART RATE: 84 BPM | OXYGEN SATURATION: 98 % | RESPIRATION RATE: 15 BRPM | BODY MASS INDEX: 33.33 KG/M2 | SYSTOLIC BLOOD PRESSURE: 110 MMHG | DIASTOLIC BLOOD PRESSURE: 67 MMHG | WEIGHT: 207.4 LBS | HEIGHT: 66 IN | TEMPERATURE: 97.7 F

## 2025-03-04 LAB
ANION GAP SERPL CALC-SCNC: 12 MMOL/L (ref 10–20)
BACTERIA BLD CULT: NORMAL
BACTERIA BLD CULT: NORMAL
BUN SERPL-MCNC: 21 MG/DL (ref 6–23)
CALCIUM SERPL-MCNC: 10.7 MG/DL (ref 8.6–10.3)
CHLORIDE SERPL-SCNC: 105 MMOL/L (ref 98–107)
CO2 SERPL-SCNC: 20 MMOL/L (ref 21–32)
CREAT SERPL-MCNC: 1.33 MG/DL (ref 0.5–1.3)
EGFRCR SERPLBLD CKD-EPI 2021: 56 ML/MIN/1.73M*2
ERYTHROCYTE [DISTWIDTH] IN BLOOD BY AUTOMATED COUNT: 12.4 % (ref 11.5–14.5)
GLUCOSE SERPL-MCNC: 109 MG/DL (ref 74–99)
HCT VFR BLD AUTO: 29.2 % (ref 41–52)
HGB BLD-MCNC: 9 G/DL (ref 13.5–17.5)
MAGNESIUM SERPL-MCNC: 1.75 MG/DL (ref 1.6–2.4)
MCH RBC QN AUTO: 28.8 PG (ref 26–34)
MCHC RBC AUTO-ENTMCNC: 30.8 G/DL (ref 32–36)
MCV RBC AUTO: 94 FL (ref 80–100)
NRBC BLD-RTO: 0 /100 WBCS (ref 0–0)
PLATELET # BLD AUTO: 255 X10*3/UL (ref 150–450)
POTASSIUM SERPL-SCNC: 3.9 MMOL/L (ref 3.5–5.3)
RBC # BLD AUTO: 3.12 X10*6/UL (ref 4.5–5.9)
SODIUM SERPL-SCNC: 133 MMOL/L (ref 136–145)
WBC # BLD AUTO: 8.6 X10*3/UL (ref 4.4–11.3)

## 2025-03-04 PROCEDURE — 2500000002 HC RX 250 W HCPCS SELF ADMINISTERED DRUGS (ALT 637 FOR MEDICARE OP, ALT 636 FOR OP/ED): Performed by: NURSE PRACTITIONER

## 2025-03-04 PROCEDURE — 36415 COLL VENOUS BLD VENIPUNCTURE: CPT

## 2025-03-04 PROCEDURE — 2500000001 HC RX 250 WO HCPCS SELF ADMINISTERED DRUGS (ALT 637 FOR MEDICARE OP)

## 2025-03-04 PROCEDURE — RXMED WILLOW AMBULATORY MEDICATION CHARGE

## 2025-03-04 PROCEDURE — 2500000004 HC RX 250 GENERAL PHARMACY W/ HCPCS (ALT 636 FOR OP/ED): Performed by: NURSE PRACTITIONER

## 2025-03-04 PROCEDURE — 93016 CV STRESS TEST SUPVJ ONLY: CPT | Performed by: INTERNAL MEDICINE

## 2025-03-04 PROCEDURE — 85027 COMPLETE CBC AUTOMATED: CPT

## 2025-03-04 PROCEDURE — 2500000002 HC RX 250 W HCPCS SELF ADMINISTERED DRUGS (ALT 637 FOR MEDICARE OP, ALT 636 FOR OP/ED): Performed by: INTERNAL MEDICINE

## 2025-03-04 PROCEDURE — 93017 CV STRESS TEST TRACING ONLY: CPT

## 2025-03-04 PROCEDURE — 83735 ASSAY OF MAGNESIUM: CPT

## 2025-03-04 PROCEDURE — 78452 HT MUSCLE IMAGE SPECT MULT: CPT

## 2025-03-04 PROCEDURE — 3430000001 HC RX 343 DIAGNOSTIC RADIOPHARMACEUTICALS: Performed by: INTERNAL MEDICINE

## 2025-03-04 PROCEDURE — 80048 BASIC METABOLIC PNL TOTAL CA: CPT

## 2025-03-04 PROCEDURE — 2500000004 HC RX 250 GENERAL PHARMACY W/ HCPCS (ALT 636 FOR OP/ED)

## 2025-03-04 PROCEDURE — 93018 CV STRESS TEST I&R ONLY: CPT | Performed by: INTERNAL MEDICINE

## 2025-03-04 PROCEDURE — A9502 TC99M TETROFOSMIN: HCPCS | Performed by: INTERNAL MEDICINE

## 2025-03-04 PROCEDURE — 99233 SBSQ HOSP IP/OBS HIGH 50: CPT

## 2025-03-04 RX ORDER — NAPROXEN SODIUM 220 MG/1
81 TABLET, FILM COATED ORAL DAILY
Qty: 30 TABLET | Refills: 0 | Status: SHIPPED | OUTPATIENT
Start: 2025-03-05 | End: 2025-04-04

## 2025-03-04 RX ORDER — REGADENOSON 0.08 MG/ML
0.4 INJECTION, SOLUTION INTRAVENOUS
Status: COMPLETED | OUTPATIENT
Start: 2025-03-04 | End: 2025-03-04

## 2025-03-04 RX ORDER — TAMSULOSIN HYDROCHLORIDE 0.4 MG/1
0.4 CAPSULE ORAL DAILY
Qty: 30 CAPSULE | Refills: 0 | Status: SHIPPED | OUTPATIENT
Start: 2025-03-05 | End: 2025-04-04

## 2025-03-04 RX ORDER — SIMVASTATIN 40 MG/1
40 TABLET, FILM COATED ORAL NIGHTLY
Qty: 30 TABLET | Refills: 0 | Status: SHIPPED | OUTPATIENT
Start: 2025-03-04 | End: 2025-04-03

## 2025-03-04 RX ORDER — AMINOPHYLLINE 25 MG/ML
125 INJECTION, SOLUTION INTRAVENOUS AS NEEDED
Status: DISCONTINUED | OUTPATIENT
Start: 2025-03-04 | End: 2025-03-04 | Stop reason: HOSPADM

## 2025-03-04 RX ORDER — OXYBUTYNIN CHLORIDE 5 MG/1
5 TABLET ORAL 3 TIMES DAILY
Qty: 90 TABLET | Refills: 0 | Status: SHIPPED | OUTPATIENT
Start: 2025-03-04 | End: 2025-04-03

## 2025-03-04 RX ORDER — POTASSIUM CHLORIDE 20 MEQ/1
20 TABLET, EXTENDED RELEASE ORAL ONCE
Status: COMPLETED | OUTPATIENT
Start: 2025-03-04 | End: 2025-03-04

## 2025-03-04 RX ORDER — LANOLIN ALCOHOL/MO/W.PET/CERES
400 CREAM (GRAM) TOPICAL ONCE
Status: COMPLETED | OUTPATIENT
Start: 2025-03-04 | End: 2025-03-04

## 2025-03-04 RX ORDER — METOPROLOL TARTRATE 25 MG/1
12.5 TABLET, FILM COATED ORAL 2 TIMES DAILY
Qty: 30 TABLET | Refills: 0 | Status: SHIPPED | OUTPATIENT
Start: 2025-03-04 | End: 2025-04-03

## 2025-03-04 RX ADMIN — TETROFOSMIN 11.2 MILLICURIE: 0.23 INJECTION, POWDER, LYOPHILIZED, FOR SOLUTION INTRAVENOUS at 11:30

## 2025-03-04 RX ADMIN — OXYBUTYNIN CHLORIDE 5 MG: 5 TABLET ORAL at 09:04

## 2025-03-04 RX ADMIN — REGADENOSON 0.4 MG: 0.08 INJECTION, SOLUTION INTRAVENOUS at 12:37

## 2025-03-04 RX ADMIN — HEPARIN SODIUM 5000 UNITS: 5000 INJECTION, SOLUTION INTRAVENOUS; SUBCUTANEOUS at 03:00

## 2025-03-04 RX ADMIN — HEPARIN SODIUM 5000 UNITS: 5000 INJECTION, SOLUTION INTRAVENOUS; SUBCUTANEOUS at 09:04

## 2025-03-04 RX ADMIN — Medication 400 MG: at 15:16

## 2025-03-04 RX ADMIN — TAMSULOSIN HYDROCHLORIDE 0.4 MG: 0.4 CAPSULE ORAL at 09:04

## 2025-03-04 RX ADMIN — POTASSIUM CHLORIDE 20 MEQ: 1500 TABLET, EXTENDED RELEASE ORAL at 15:16

## 2025-03-04 RX ADMIN — ASPIRIN 81 MG CHEWABLE TABLET 81 MG: 81 TABLET CHEWABLE at 09:04

## 2025-03-04 RX ADMIN — OXYBUTYNIN CHLORIDE 5 MG: 5 TABLET ORAL at 15:16

## 2025-03-04 RX ADMIN — AMLODIPINE BESYLATE 10 MG: 10 TABLET ORAL at 09:04

## 2025-03-04 RX ADMIN — TETROFOSMIN 35 MILLICURIE: 0.23 INJECTION, POWDER, LYOPHILIZED, FOR SOLUTION INTRAVENOUS at 12:35

## 2025-03-04 ASSESSMENT — PAIN - FUNCTIONAL ASSESSMENT
PAIN_FUNCTIONAL_ASSESSMENT: 0-10

## 2025-03-04 ASSESSMENT — COGNITIVE AND FUNCTIONAL STATUS - GENERAL
DAILY ACTIVITIY SCORE: 22
MOVING TO AND FROM BED TO CHAIR: A LITTLE
MOBILITY SCORE: 19
PERSONAL GROOMING: A LITTLE
STANDING UP FROM CHAIR USING ARMS: A LITTLE
CLIMB 3 TO 5 STEPS WITH RAILING: A LOT
WALKING IN HOSPITAL ROOM: A LITTLE
TOILETING: A LITTLE

## 2025-03-04 ASSESSMENT — PAIN SCALES - GENERAL
PAINLEVEL_OUTOF10: 0 - NO PAIN

## 2025-03-04 NOTE — PROGRESS NOTES
Nam Austin is a 75 y.o. male on day 5 of admission presenting with MOJGAN (acute kidney injury) (CMS-[facility]).      Subjective   Feels well, no chest pain, eager to go home       Objective     Last Recorded Vitals  /67 (BP Location: Right arm, Patient Position: Lying)   Pulse 82   Temp 36.5 °C (97.7 °F) (Temporal)   Resp 15   Wt 94.1 kg (207 lb 6.4 oz)   SpO2 98%   Intake/Output last 3 Shifts:    Intake/Output Summary (Last 24 hours) at 3/4/2025 1152  Last data filed at 3/4/2025 1102  Gross per 24 hour   Intake 1100 ml   Output 2501 ml   Net -1401 ml       Admission Weight  Weight: 91.2 kg (201 lb) (02/27/25 1524)    Daily Weight  03/04/25 : 94.1 kg (207 lb 6.4 oz)    Image Results  ECG 12 lead  Sinus rhythm with Premature supraventricular complexes with occasional Premature ventricular complexes  Nonspecific ST and T wave abnormality  Abnormal ECG  When compared with ECG of 27-FEB-2025 17:13, (unconfirmed)  Premature ventricular complexes are now Present  Premature supraventricular complexes are now Present  Nonspecific T wave abnormality now evident in Lateral leads  Transthoracic Echo (TTE) Complete              [facility] [place]  [address] [address]     Tel [phone] Fax [phone]    TRANSTHORACIC ECHOCARDIOGRAM REPORT    Patient Name:       NAM AUSTIN          Reading Physician:    [id number] Chivo Talley MD  Study Date:         3/3/2025             Ordering Provider:    [id number] NEHEMIAH VICTORIA  MRN/PID:            [id number]             Fellow:  Accession#:         [id number]         Nurse:  Date of Birth/Age:  1950 / 75 years Sonographer:          Raquel Hagan RD  Gender Assigned at                      Additional Staff:  Birth:  Height:             167.64 cm            Admit  Date:  Weight:             91.17 kg             Admission Status:     Inpatient -                                                                 Routine  BSA / BMI:          2.00 m2 / 32.44      Department Location:  [facility] Echo Lab                      kg/m2  Blood Pressure: 100 /55 mmHg    Study Type:    TRANSTHORACIC ECHO (TTE) COMPLETE  Diagnosis/ICD: Unspecified atrial fibrillation-I48.91  Indication:    Afib with RVR  CPT Codes:     Echo Complete w Full [id number]    Patient History:  Pertinent History: HTN, Hyperlipidemia and A-Fib.    Study Detail: The following Echo studies were performed: 2D, M-Mode, Doppler and                color flow.       PHYSICIAN INTERPRETATION:  Left Ventricle: The left ventricular systolic function is mildly decreased, with a visually estimated ejection fraction of 50%. There is mild concentric left ventricular hypertrophy. There are no regional wall motion abnormalities. The left ventricular cavity size is normal. There is mild increased septal and mildly increased posterior left ventricular wall thickness. There is left ventricular concentric remodeling. Spectral Doppler shows a Grade II (pseudonormal pattern) of left ventricular diastolic filling with an elevated left atrial pressure.  Left Atrium: The left atrium is upper limits of normal in size.  Right Ventricle: The right ventricle is normal in size. There is normal right ventricular global systolic function.  Right Atrium: The right atrial size is normal.  Aortic Valve: The aortic valve is trileaflet. There is evidence of mildly elevated transaortic gradients consistent with sclerosis of the aortic valve.  There is no evidence of aortic valve regurgitation. The peak instantaneous gradient of the aortic valve is 7 mmHg.  Mitral Valve: The mitral valve is mildly thickened. There is mild mitral annular calcification. There is trace mitral valve regurgitation.  Tricuspid Valve: The tricuspid valve is structurally normal. No  evidence of tricuspid regurgitation. The right ventricular systolic pressure is unable to be estimated.  Pulmonic Valve: The pulmonic valve is structurally normal. There is no indication of pulmonic valve regurgitation.  Pericardium: No pericardial effusion noted.  Aorta: The aortic root is normal.  Systemic Veins: The inferior vena cava appears normal in size.       CONCLUSIONS:   1. The left ventricular systolic function is mildly decreased, with a visually estimated ejection fraction of 50%.   2. Spectral Doppler shows a Grade II (pseudonormal pattern) of left ventricular diastolic filling with an elevated left atrial pressure.   3. Aortic valve sclerosis.    QUANTITATIVE DATA SUMMARY:     2D MEASUREMENTS:             Normal Ranges:  LAs:             3.17 cm     (2.7-4.0cm)  IVSd:            1.12 cm     (0.6-1.1cm)  LVPWd:           1.09 cm     (0.6-1.1cm)  LVIDd:           5.46 cm     (3.9-5.9cm)  LVIDs:           3.64 cm  LV Mass Index:   120.2 g/m2  LVEDV Index:     38.73 ml/m2  LV % FS          33.4 %       LEFT ATRIUM:                 Normal Ranges:  LA Area A4C:      19.6 cm2  LA Area A2C:      19.2 cm2  LA Volume Index:  27.1 ml/m2  LA Vol A4C:       49.0 ml  LA Vol A2C:       51.8 ml  LA Vol Index BSA: 25.1 ml/m2       M-MODE MEASUREMENTS:         Normal Ranges:  AoV Exc:             2.37 cm (1.5-2.5cm)       AORTA MEASUREMENTS:         Normal Ranges:  AoV Exc:            2.37 cm (1.5-2.5cm)       LV SYSTOLIC FUNCTION:                       Normal Ranges:  EF-A4C View:    44 % (>=55%)  EF-A2C View:    44 %  EF-Biplane:     43 %  EF-Visual:      50 %  LV EF Reported: 50 %       LV DIASTOLIC FUNCTION:            Normal Ranges:  MV Peak E:             0.39 m/s   (0.7-1.2 m/s)  MV Peak A:             0.71 m/s   (0.42-0.7 m/s)  E/A Ratio:             0.55       (1.0-2.2)  MV e'                  0.110 m/s  (>8.0)  MV lateral e'          0.13 m/s  MV medial e'           0.09 m/s  E/e' Ratio:            3.57        (<8.0)  PulmV Sys Shamir:         54.22 cm/s  PulmV Holder Shamir:        47.11 cm/s  PulmV S/D Shamir:         1.15       MITRAL VALVE:          Normal Ranges:  MV DT:        211 msec (150-240msec)       AORTIC VALVE:           Normal Ranges:  AoV Vmax:      1.34 m/s (<=1.7m/s)  AoV Peak P.1 mmHg (<20mmHg)  LVOT Max Shamir:  0.81 m/s (<=1.1m/s)  LVOT Diameter: 2.35 cm  (1.8-2.4cm)  AoV Area,Vmax: 2.62 cm2 (2.5-4.5cm2)       RIGHT VENTRICLE:  RV Basal 3.10 cm  RV Mid   1.80 cm  RV Major 7.5 cm  TAPSE:   18.0 mm  RV s'    0.17 m/s       TRICUSPID VALVE/RVSP:         Normal Ranges:  IVC Diam:             1.71 cm       PULMONIC VALVE:          Normal Ranges:  PV Accel Time:  137 msec (>120ms)       PULMONARY VEINS:  PulmV Holder Shamir: 47.11 cm/s  PulmV S/D Shamir:  1.15  PulmV Sys Shamir:  54.22 cm/s       AORTA:  Asc Ao Diam 3.67 cm       [id number] Chivo Talley MD  Electronically signed on 3/3/2025 at 1:08:57 PM       ** Final **      Physical Exam  General: NAD. NCAT.   Cardiovascular: RRR. No MRG. S1/S2 wnl.   Respiratory: CTABL. No acute respiratory distress.   MSK: ROM x 4. CTLS non-tender.   Extremities: No edema. Cap refill < 2 sec.   Skin: No rashes or bruises.   Neuro: Aox3. Cranial Nerves grossly intact. Motor/sensory wnl.   Psych: Mood wnl.   Relevant Results    Scheduled medications  amLODIPine, 10 mg, oral, Daily  aspirin, 81 mg, oral, Daily  [Held by provider] dorzolamide-timoloL, 1 drop, Both Eyes, BID  heparin (porcine), 5,000 Units, subcutaneous, q8h  [Held by provider] hydroCHLOROthiazide, 12.5 mg, oral, Daily  latanoprost, 1 drop, Both Eyes, Nightly  [Held by provider] lisinopril, 20 mg, oral, Daily  metoprolol tartrate, 12.5 mg, oral, BID  oxybutynin, 5 mg, oral, TID  regadenoson, 0.4 mg, intravenous, Once in imaging  simvastatin, 40 mg, oral, Nightly  tamsulosin, 0.4 mg, oral, Daily      Continuous medications     PRN medications  PRN medications: acetaminophen **OR** acetaminophen **OR** acetaminophen, aminophylline,  melatonin, polyethylene glycol  Results for orders placed or performed during the hospital encounter of 02/27/25 (from the past 24 hours)   Basic metabolic panel   Result Value Ref Range    Glucose 109 (H) 74 - 99 mg/dL    Sodium 133 (L) 136 - 145 mmol/L    Potassium 3.9 3.5 - 5.3 mmol/L    Chloride 105 98 - 107 mmol/L    Bicarbonate 20 (L) 21 - 32 mmol/L    Anion Gap 12 10 - 20 mmol/L    Urea Nitrogen 21 6 - 23 mg/dL    Creatinine 1.33 (H) 0.50 - 1.30 mg/dL    eGFR 56 (L) >60 mL/min/1.73m*2    Calcium 10.7 (H) 8.6 - 10.3 mg/dL   CBC   Result Value Ref Range    WBC 8.6 4.4 - 11.3 x10*3/uL    nRBC 0.0 0.0 - 0.0 /100 WBCs    RBC 3.12 (L) 4.50 - 5.90 x10*6/uL    Hemoglobin 9.0 (L) 13.5 - 17.5 g/dL    Hematocrit 29.2 (L) 41.0 - 52.0 %    MCV 94 80 - 100 fL    MCH 28.8 26.0 - 34.0 pg    MCHC 30.8 (L) 32.0 - 36.0 g/dL    RDW 12.4 11.5 - 14.5 %    Platelets 255 150 - 450 x10*3/uL   Magnesium   Result Value Ref Range    Magnesium 1.75 1.60 - 2.40 mg/dL     ECG 12 lead    Result Date: 3/3/2025  Sinus rhythm with Premature supraventricular complexes with occasional Premature ventricular complexes Nonspecific ST and T wave abnormality Abnormal ECG When compared with ECG of 27-FEB-2025 17:13, (unconfirmed) Premature ventricular complexes are now Present Premature supraventricular complexes are now Present Nonspecific T wave abnormality now evident in Lateral leads    Transthoracic Echo (TTE) Complete    Result Date: 3/3/2025            [facility] [address] [facility], [facility] [address]    Tel [phone] Fax [phone] TRANSTHORACIC ECHOCARDIOGRAM REPORT Patient Name:       NAM Hernandez Physician:    [id number] Chivo Talley MD Study Date:         3/3/2025             Ordering Provider:    [id number] NEHEMIAH VICTORIA MRN/PID:            [id number]             Fellow: Accession#:          [id number]         Nurse: Date of Birth/Age:  1950 / 75 years Sonographer:          Raquel Hagan RDCS Gender Assigned at  M                    Additional Staff: Birth: Height:             167.64 cm            Admit Date: Weight:             91.17 kg             Admission Status:     Inpatient -                                                                Routine BSA / BMI:          2.00 m2 / 32.44      Department Location:  [facility] Echo Lab                     kg/m2 Blood Pressure: 100 /55 mmHg Study Type:    TRANSTHORACIC ECHO (TTE) COMPLETE Diagnosis/ICD: Unspecified atrial fibrillation-I48.91 Indication:    Afib with RVR CPT Codes:     Echo Complete w Full Doppler-[id number] Patient History: Pertinent History: HTN, Hyperlipidemia and A-Fib. Study Detail: The following Echo studies were performed: 2D, M-Mode, Doppler and               color flow.  PHYSICIAN INTERPRETATION: Left Ventricle: The left ventricular systolic function is mildly decreased, with a visually estimated ejection fraction of 50%. There is mild concentric left ventricular hypertrophy. There are no regional wall motion abnormalities. The left ventricular cavity size is normal. There is mild increased septal and mildly increased posterior left ventricular wall thickness. There is left ventricular concentric remodeling. Spectral Doppler shows a Grade II (pseudonormal pattern) of left ventricular diastolic filling with an elevated left atrial pressure. Left Atrium: The left atrium is upper limits of normal in size. Right Ventricle: The right ventricle is normal in size. There is normal right ventricular global systolic function. Right Atrium: The right atrial size is normal. Aortic Valve: The aortic valve is trileaflet. There is evidence of mildly elevated transaortic gradients consistent with sclerosis of the aortic valve. There is no evidence of aortic valve regurgitation. The peak  instantaneous gradient of the aortic valve is 7 mmHg. Mitral Valve: The mitral valve is mildly thickened. There is mild mitral annular calcification. There is trace mitral valve regurgitation. Tricuspid Valve: The tricuspid valve is structurally normal. No evidence of tricuspid regurgitation. The right ventricular systolic pressure is unable to be estimated. Pulmonic Valve: The pulmonic valve is structurally normal. There is no indication of pulmonic valve regurgitation. Pericardium: No pericardial effusion noted. Aorta: The aortic root is normal. Systemic Veins: The inferior vena cava appears normal in size.  CONCLUSIONS:  1. The left ventricular systolic function is mildly decreased, with a visually estimated ejection fraction of 50%.  2. Spectral Doppler shows a Grade II (pseudonormal pattern) of left ventricular diastolic filling with an elevated left atrial pressure.  3. Aortic valve sclerosis. QUANTITATIVE DATA SUMMARY:  2D MEASUREMENTS:             Normal Ranges: LAs:             3.17 cm     (2.7-4.0cm) IVSd:            1.12 cm     (0.6-1.1cm) LVPWd:           1.09 cm     (0.6-1.1cm) LVIDd:           5.46 cm     (3.9-5.9cm) LVIDs:           3.64 cm LV Mass Index:   120.2 g/m2 LVEDV Index:     38.73 ml/m2 LV % FS          33.4 %  LEFT ATRIUM:                 Normal Ranges: LA Area A4C:      19.6 cm2 LA Area A2C:      19.2 cm2 LA Volume Index:  27.1 ml/m2 LA Vol A4C:       49.0 ml LA Vol A2C:       51.8 ml LA Vol Index BSA: 25.1 ml/m2  M-MODE MEASUREMENTS:         Normal Ranges: AoV Exc:             2.37 cm (1.5-2.5cm)  AORTA MEASUREMENTS:         Normal Ranges: AoV Exc:            2.37 cm (1.5-2.5cm)  LV SYSTOLIC FUNCTION:                      Normal Ranges: EF-A4C View:    44 % (>=55%) EF-A2C View:    44 % EF-Biplane:     43 % EF-Visual:      50 % LV EF Reported: 50 %  LV DIASTOLIC FUNCTION:            Normal Ranges: MV Peak E:             0.39 m/s   (0.7-1.2 m/s) MV Peak A:             0.71 m/s   (0.42-0.7  m/s) E/A Ratio:             0.55       (1.0-2.2) MV e'                  0.110 m/s  (>8.0) MV lateral e'          0.13 m/s MV medial e'           0.09 m/s E/e' Ratio:            3.57       (<8.0) PulmV Sys Shamir:         54.22 cm/s PulmV Holder Shamir:        47.11 cm/s PulmV S/D Shamir:         1.15  MITRAL VALVE:          Normal Ranges: MV DT:        211 msec (150-240msec)  AORTIC VALVE:           Normal Ranges: AoV Vmax:      1.34 m/s (<=1.7m/s) AoV Peak P.1 mmHg (<20mmHg) LVOT Max Shamir:  0.81 m/s (<=1.1m/s) LVOT Diameter: 2.35 cm  (1.8-2.4cm) AoV Area,Vmax: 2.62 cm2 (2.5-4.5cm2)  RIGHT VENTRICLE: RV Basal 3.10 cm RV Mid   1.80 cm RV Major 7.5 cm TAPSE:   18.0 mm RV s'    0.17 m/s  TRICUSPID VALVE/RVSP:         Normal Ranges: IVC Diam:             1.71 cm  PULMONIC VALVE:          Normal Ranges: PV Accel Time:  137 msec (>120ms)  PULMONARY VEINS: PulmV Holder Shamir: 47.11 cm/s PulmV S/D Shamir:  1.15 PulmV Sys Shamir:  54.22 cm/s  AORTA: Asc Ao Diam 3.67 cm  [id number] Chivo Talley MD Electronically signed on 3/3/2025 at 1:08:57 PM  ** Final **     ECG 12 lead    Result Date: 2025  Normal sinus rhythm Normal ECG When compared with ECG of 27-DEC-2024 10:59, Aberrant conduction is no longer Present Nonspecific T wave abnormality no longer evident in Inferior leads Nonspecific T wave abnormality no longer evident in Lateral leads    CT abdomen pelvis wo IV contrast    Result Date: 2025  STUDY: CT Abdomen and Pelvis without IV Contrast; 2025 at 9:54 PM. INDICATION: Severe MOJGAN, decreased urine output. COMPARISON: None available. ACCESSION NUMBER(S): [id number] ORDERING CLINICIAN: ADONIS MCKEON TECHNIQUE: CT of the abdomen and pelvis was performed.  Contiguous axial images were obtained at 3 mm slice thickness through the abdomen and pelvis. Coronal and sagittal reconstructions at 3 mm slice thickness were performed. No intravenous contrast was administered.  Automated mA/kV exposure control was utilized and patient  examination was performed in strict accordance with principles of ALARA. FINDINGS: PLEASE NOTE THAT IN THE ABSENCE OF IV CONTRAST, SENSITIVITY FOR DETECTING VISCERAL AND VASCULAR ABNORMALITIES AS WELL AS INFLAMMATORY AND NEOPLASTIC CONDITIONS IS REDUCED. Imaged lower chest: No acute finding. There is coronary artery calcification, a marker for coronary atherosclerotic disease. Liver/gallbladder: Normal hepatic morphology. No suspicious space-occupying hepatic lesion. Gallbladder contains layering hyperdense material which is probably sludge. Adrenal: No adrenal nodule. Renal: No hydronephrosis. No suspicious renal lesion. No renal or ureteral calculus. Spleen: Normal in size. Pancreas: Unremarkable without surrounding infiltration or fluid. Vascular: Aorta and common iliac arteries normal in caliber. Urinary bladder: Decompressed. Reproductive organs: Enlarged and heterogeneous prostate gland measures 7.1 cm in transverse dimension. 3.4 x 2.2 cm low-density focus in the right aspect of the prostate gland image 142, series 201 is nonspecific. GI: No CT finding to suggest acute diverticulitis or colitis. No dilated small bowel loops. Appendix normal. Other: No drainable fluid collection. No free air. Musculoskeletal: No suspicious osseous lesion. No acute fracture identified.    *Enlarged and heterogeneous prostate gland with 3.4 x 2.2 cm low-density focus in the right aspect of the prostate gland is nonspecific. Malignancy and infection would be on the differential. Correlate with PSA and WBC levels. *No hydronephrosis. No renal or ureteral calculus. *No CT finding to suggest acute diverticulitis or colitis. Appendix normal. *Coronary artery calcification, a marker for coronary atherosclerotic disease. Signed by Ladarius Bone MD    XR chest 1 view    Result Date: 2/27/2025  Interpreted By:  Bsasam Leon, STUDY: XR CHEST 1 VIEW;  2/27/2025 5:52 pm   INDICATION: Signs/Symptoms:Chest Pain.     COMPARISON: 10/01/2019    ACCESSION NUMBER(S): [id number]   ORDERING CLINICIAN: ADONIS MCKEON   FINDINGS: Bronchial thickening is noted. There does appear to be slight fluid seen in the right fissure. No pneumothorax. No pneumothorax. No localizing infiltrate.       1.  No localizing infiltrate. Trace fluid seen in the right fissure.       MACRO: None   Signed by: Bassam Leon 2/27/2025 6:08 PM Dictation workstation:   [id number]               Assessment/Plan   This patient currently has cardiac telemetry ordered; if you would like to modify or discontinue the telemetry order, click here to go to the orders activity to modify/discontinue the order.    Narrow complex tachycardia -no anticoagulation needed, echo did show EF 50%, mild decrease.  WCT- continue metoprolol tartrate 12.5 mg BID, aspirin 81 mg, simvastatin to 40 mg daily.  Continue with ischemic workup pending Elisabeth scan  Left ventricular diastolic dysfunction -in the setting of the UTI, it is reasonable to hold off on SGLT2 inhibitor therapy for now.  Appears euvolemic on exam, blood pressure  well-controlled.    Discussed with attending physician Dr. Gerri Fuentes D.O.  PGY-2 Internal medicine resident

## 2025-03-04 NOTE — PROGRESS NOTES
INPATIENT NEPHROLOGY CONSULT PROGRESS NOTE      Patient Name: Wisam Austin MRN: [id number]  DATE of SERVICE: March 4, 2025  TIME of SERVICE: 10:37 AM  CONSULTING SERVICE: Nephrology    REASON for CONSULT: Acute kidney injury    SUBJECTIVE:  Seen and evaluated at bedside, awake.  Kiran catheter removed.  Voided afterward.  Patient scheduled to be discharged.  From renal standpoint lisinopril and hydrochlorothiazide remains on hold    SUMMARY OF STAY:  Mr. Austin is a 75 y.o. male who presented to [facility] January 27, 2025 as a direct admit from urology office for worsening outpatient labs and hypotension.    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  Patient sent to the emergency department after his evaluation at urology office urology office due to profound hypotension and acute kidney injury.  Patient seen and evaluated at bedside, awake however has a poor insight into his medical condition.  Patient is a poor historian.  He does not believe that anything is wrong with him.  Kiran catheter inserted in the emergency department with dark urine in the bag.  Patient reports mild discomfort from Kiran catheter.    ASSESSMENT:  Severe acute kidney injury likely ischemic and infectious related ATN with acute uremia:  -- urinalysis suggestive of urinary tract infection urine micro positive for granular casts and hyaline cast the findings more suggestive of ATN.   -- IV antibiotics: on IV ceftriaxone 2 g  -- Kiran catheter inserted in the emergency department  -- Scr gradually improving  -- CT abdomen pelvis ordered, reviewed.  --Recovering, postobstructive diuresis  --Improving     Sepsis, urinary tract infection, suspected prostatitis versus malignancy.   -- IV ceftriaxone     Hypercalcemia calcium 10.9--> 9.7 likely secondary to dehydration versus possible underlying prostate CA     Hyperkalemia admission potassium 5.3-- >  4.1 improving  Hyponatremia sodium 134--> 138 likely hypovolemic  Uremic acidemia, metabolic acidosis: Corrected with sodium bicarb and infusion    PLAN:  Ischemic and infectious related ATN with acute uremia responding to medical management:    1.  Kiran catheter removed continue to void adequately.  2.    3.  To continue IV ceftriaxone.  4.   Hypercalcemia, suspected underlying prostate CA.  Further workup as an outpatient especially as patient will be off hydrochlorothiazide  5.   Hyperkalemia improved with correcting metabolic acidosis, corrected  6.   Diet can be switched to regular  7.  Medication reviewed, renally adjusted  8.  Hypotension to hold amlodipine, hydrochlorothiazide.  Blood pressure remains on the lower side.       Will follow, thank you!    Medications:    Current Facility-Administered Medications:     acetaminophen (Tylenol) tablet 650 mg, 650 mg, oral, q6h PRN **OR** acetaminophen (Tylenol) oral liquid 650 mg, 650 mg, oral, q6h PRN **OR** acetaminophen (Tylenol) suppository 650 mg, 650 mg, rectal, q6h PRN, Bere Fabiana, APRN-CNP    aminophylline injection 125 mg, 125 mg, intravenous, PRN, Yohannes Fuentes, DO    amLODIPine (Norvasc) tablet 10 mg, 10 mg, oral, Daily, Bere Jung APRN-CNP, 10 mg at 03/04/25 0904    aspirin chewable tablet 81 mg, 81 mg, oral, Daily, Yohannes Fuentes, DO, 81 mg at 03/04/25 0904    [Held by provider] dorzolamide-timoloL (Cosopt) 22.3-6.8 mg/mL ophthalmic solution 1 drop, 1 drop, Both Eyes, BID, Bere Fabiana, APRN-CNP    heparin (porcine) injection 5,000 Units, 5,000 Units, subcutaneous, q8h, Bere Fabiana, APRN-CNP, 5,000 Units at 03/04/25 0904    [Held by provider] hydroCHLOROthiazide (Microzide) tablet 12.5 mg, 12.5 mg, oral, Daily, Bere Fabiana, APRN-CNP    latanoprost (Xalatan) 0.005 % ophthalmic solution 1 drop, 1 drop, Both Eyes, Nightly, Bere Fabiana APRN-CNP, 1 drop at 03/03/25 2025    [Held by provider] lisinopril tablet 20 mg, 20 mg, oral, Daily, Bere Jung,  APRN-CNP    melatonin tablet 3 mg, 3 mg, oral, Nightly PRN, Bere Fabiana, APRN-CNP, 3 mg at 03/01/25 2207    metoprolol tartrate (Lopressor) tablet 12.5 mg, 12.5 mg, oral, BID, Yohannes Sobieraj, DO, 12.5 mg at 03/03/25 1430    oxybutynin (Ditropan) tablet 5 mg, 5 mg, oral, TID, Heather Crowder, APRN-CNP, 5 mg at 03/04/25 1516    polyethylene glycol (Glycolax, Miralax) packet 17 g, 17 g, oral, Daily PRN, Bere Fabiana, APRN-CNP    simvastatin (Zocor) tablet 40 mg, 40 mg, oral, Nightly, Yohannes Sobieraj, DO, 40 mg at 03/03/25 2024    tamsulosin (Flomax) 24 hr capsule 0.4 mg, 0.4 mg, oral, Daily, Phyllis Jane MD, 0.4 mg at 03/04/25 0904    PERTINENT ROS:  GENERAL:  positive for fatigue, poor appetite.  No fever/chills  RESPIRATORY:  positive for shortness of breath.  Negative for cough, wheezing.  CARDIOVASCULAR:   Negative for chest pain or palpitation.  GI:  Negative for abdominal pain, diarrhea, heartburn, nausea, vomiting  : Indwelling Kiran catheter in place    Physical Exam:  Vital signs in last 24 hours:  Temp:  [36.5 °C (97.7 °F)-36.9 °C (98.4 °F)] 36.5 °C (97.7 °F)  Heart Rate:  [64-84] 84  Resp:  [11-33] 15  BP: ()/(54-67) 110/67    General: Awake, -American male, slow to response  HEENT:  NCAT,  mucous membranes moist and pink  NECK:  Elevated JVD, no carotid bruit, supple, no cervical mass or thyromegaly  LUNGS;  Diminished breath sounds, no Rales  CV:  Distant, regular rate and rhythm, no murmurs  ABDOMEN:  abdomen soft, nontender, BS normal, no masses or organomegaly  EDEMA: No lower extremity edema/dependent edema  SKIN:  dry and normal turgor, no clubbing, cyanosis or petechia.  No rashes noted  : Kiran catheter in place    Intake/Output last 3 shifts:  I/O last 3 completed shifts:  In: 1580 (16.8 mL/kg) [P.O.:1580]  Out: 3351 (35.6 mL/kg) [Urine:3351 (1 mL/kg/hr)]  Weight: 94.1 kg     DATA:  Diagnotic tests reviewed for Todays visit:  Results from last 7 days   Lab Units  03/04/25  0518   WBC AUTO x10*3/uL 8.6   RBC AUTO x10*6/uL 3.12*   HEMOGLOBIN g/dL 9.0*   HEMATOCRIT % 29.2*     Results from last 7 days   Lab Units 03/04/25  0518 02/28/25  0718 02/27/25  1653   SODIUM mmol/L 133*   < > 134*   POTASSIUM mmol/L 3.9   < > 5.3   CHLORIDE mmol/L 105   < > 97*   CO2 mmol/L 20*   < > 21   BUN mg/dL 21   < > 134*   CREATININE mg/dL 1.33*   < > 7.86*   CALCIUM mg/dL 10.7*   < > 10.9*   MAGNESIUM mg/dL 1.75   < > 3.07*   BILIRUBIN TOTAL mg/dL  --   --  0.8   ALT U/L  --   --  10   AST U/L  --   --  15    < > = values in this interval not displayed.     Results from last 7 days   Lab Units 02/27/25  1916   COLOR U  Yellow   APPEARANCE U  Turbid*   PH U  5.0   SPEC GRAV UR  1.019   PROTEIN U mg/dL 30 (1+)*   BLOOD UR mg/dL NEGATIVE   NITRITE U  NEGATIVE   WBC UR /HPF 21-50*   BACTERIA UR /HPF 1+*     IMAGING: CXR reviewed in  images      SIGNATURE: Phyllis Jane MD  Nephrology and Hypertension  [address] [place]  Office phone: [phone]  FAX: [phone]    This note was partially generated using the Dragon voice recognition system, and there may be some incorrect words, spelling's and punctuation that were not noted in checking the note before saving.

## 2025-03-04 NOTE — PROGRESS NOTES
Occupational Therapy                 Therapy Communication Note    Patient Name: Wisam Austin  MRN: [id number]  Department: [facility]  Room: 2105/2105-A  Today's Date: 3/4/2025     Discipline: Occupational Therapy    OT Missed Visit: Yes     Missed Visit Reason: Missed Visit Reason: Patient in a medical procedure    Missed Time: Attempt    Comment:

## 2025-03-04 NOTE — PROGRESS NOTES
"Nutrition Follow Up Assessment:   Nutrition Assessment         Nutrition Note:  Wisam Austin is a 75 y.o. male presenting  from physician office with hypotension. Work up revealing MOJGAN/UTI/enlarged prostate. Nephrology on consult with no current plan for HD.     3/4/2025 Follow up: Chart reviewed and events noted. Renal parameters improving--pt transitioned off renal diet per Nephrology on 3/3. Pt currently NPO for stress test otherwise regular. Per nurse, pt's appetite is poor.     Past Medical History   has a past medical history of Cataract, Colon polyps, COVID-19, Erectile dysfunction, Glaucoma, Hyperlipidemia, Hypertension, Myocardial infarction (Multi), Penile lesion, and Wears glasses.  Surgical History   has a past surgical history that includes Cataract extraction; Colonoscopy; Other surgical history (2025); Circumcision, non-; and Trabeculectomy.       Nutrition History:  Energy Intake: Fair 50-75 %, Poor < 50 %  Food and Nutrient History: 3/4: per pt \"I don't eat beef or pork.\" : Met with pt at bedside. Pt reports fair appetite PTA \"I just eat basic foods.\" Admits to decreased appetite since not feeling well.  NKFA/intolerances. Pt hopeful for discharge soon.  Vitamin/Herbal Supplement Use: calcium citrated and MVI  Food Allergy:  (NKFA)  Food Intolerance:  (no food intolerances reported)       Anthropometrics:  Height: 167.6 cm (5' 6\")   Weight: 94.1 kg (207 lb 6.4 oz)   BMI (Calculated): 33.49   Pt currently denying weight loss; wants to go home soon.   IBW 64.5kg  Admit weight 92.4kg   0-10 (Numeric) Pain Score: 0 - No pain     Weight History:   3/4: 94.1kg pt net-7.35L as of 3/4 0700hrs  2025: 108kg  10/2024: 107kg  Weight Change %:  Significant Weight Loss:  (pt denies)    Nutrition Focused Physical Exam Findings:    Subcutaneous Fat Loss:   Defer Subcutaneous Fat Loss Assessment: Defer all  Defer All Reason: per pt request  Muscle Wasting:  Defer Muscle Wasting Assessment: Defer " all  Defer All Reason: per pt request  Edema:  Edema: none  Physical Findings:  Skin: Negative (intact)    Nutrition Significant Labs:  BMP Trend:   Results from last 7 days   Lab Units 03/04/25  0518 03/03/25  0432 03/02/25  0539 03/01/25  0503   GLUCOSE mg/dL 109* 109* 122* 107*   CALCIUM mg/dL 10.7* 10.5* 10.0 9.7   SODIUM mmol/L 133* 132* 137 138   POTASSIUM mmol/L 3.9 3.8 4.1 4.1   CO2 mmol/L 20* 20* 20* 22   CHLORIDE mmol/L 105 103 107 105   BUN mg/dL 21 31* 50* 82*   CREATININE mg/dL 1.33* 1.54* 1.87* 3.05*    , A1C:  Lab Results   Component Value Date    HGBA1C 4.6 (L) 05/21/2022   , BG POCT trend:    , Liver Function Trend:   Results from last 7 days   Lab Units 02/27/25  1653   ALK PHOS U/L 64   AST U/L 15   ALT U/L 10   BILIRUBIN TOTAL mg/dL 0.8        Nutrition Specific Medications:  Scheduled medications  amLODIPine, 10 mg, oral, Daily  aspirin, 81 mg, oral, Daily  [Held by provider] dorzolamide-timoloL, 1 drop, Both Eyes, BID  heparin (porcine), 5,000 Units, subcutaneous, q8h  [Held by provider] hydroCHLOROthiazide, 12.5 mg, oral, Daily  latanoprost, 1 drop, Both Eyes, Nightly  [Held by provider] lisinopril, 20 mg, oral, Daily  metoprolol tartrate, 12.5 mg, oral, BID  oxybutynin, 5 mg, oral, TID  simvastatin, 40 mg, oral, Nightly  tamsulosin, 0.4 mg, oral, Daily      Continuous medications       PRN medications  PRN medications: acetaminophen **OR** acetaminophen **OR** acetaminophen, aminophylline, melatonin, polyethylene glycol     I/O:    ;      Dietary Orders (From admission, onward)       Start     Ordered    03/03/25 1612  Adult diet Regular  Diet effective now        Question:  Diet type  Answer:  Regular    03/03/25 1611    02/27/25 8611  May Participate in Room Service  ( ROOM SERVICE MAY PARTICIPATE)  Once        Question:  .  Answer:  Yes    02/27/25 2318                     Estimated Needs:   Total Energy Estimated Needs in 24 hours (kCal):  (2100-2300kcal (23-25kcal/kg of 92.4kg))    "  Total Protein Estimated Needs in 24 Hours (g):  (65-80g (1-1.3kg per kg 64.5kg IBW))     Total Fluid Estimated Needs in 24 Hours (mL):  (1mL/kcal/d or as per physician)           Nutrition Diagnosis   Malnutrition Diagnosis  Patient has Malnutrition Diagnosis: No    Nutrition Diagnosis  Patient has Nutrition Diagnosis: Yes  Diagnosis Status (1): Active  Nutrition Diagnosis 1: Increased nutrient needs  Related to (1): acute metabolic stress  As Evidenced by (1): infection, MOJGAN, and inconsistent oral intakes.       Nutrition Interventions/Recommendations   Nutrition prescription for oral nutrition    Nutrition Recommendations:  Individualized Nutrition Prescription Provided for : oral diet will provide >75% of estimated nutrient needs    Nutrition Interventions/Goals:   Interventions: Meals and snacks, Medical food supplement  Meals and Snacks: General healthful diet  Goal: continue with regular diet in an attempt to encourage oral intakes.  Medical Food Supplement: Commercial beverage medical food supplement therapy, Commercial food medical food supplement therapy  Goal: currently declines  Coordination of Care with Providers: Nursing (AUGUSTA Smith)      Education documentation   3/4: Met with pt at bedside; aware NPO for stress test and aware now on liberalize/regular diet. Per pt \"an omelet with turkey sausage sounds really good; maybe I will do that after the test today.\" Pt aware can order small and frequent meals/snacks. Pt again stated he is hopeful for discharge soon.        Nutrition Monitoring and Evaluation   Food/Nutrient Related History Monitoring  Monitoring and Evaluation Plan: Estimated Energy Intake  Estimated Energy Intake: Energy intake 50 -75% of estimated energy needs  Intake / Amount of food: Consumes at least 50% or more of meals/snacks/supplements    Anthropometric Measurements  Monitoring and Evaluation Plan: Body weight  Body Weight: Measured body weight  Criteria: daily weight    Biochemical " Data, Medical Tests and Procedures  Monitoring and Evaluation Plan: Electrolyte/renal panel  Electrolyte and Renal Panel: Electrolytes within normal limits  Criteria: labs WNR              Time Spent (min): 30 minutes

## 2025-03-05 NOTE — DOCUMENTATION CLARIFICATION NOTE
"    PATIENT:               NAM HALEY  ACCT #:                  [id number]  MRN:                       [id number]  :                       1950  ADMIT DATE:       2025 4:36 PM  DISCH DATE:        3/4/2025 5:38 PM  RESPONDING PROVIDER #:        [id number]          PROVIDER RESPONSE TEXT:    Sepsis 2/2 UTI with renal organ dysfunction of ATN    CDI QUERY TEXT:    Clarification    Instruction:  Based on your assessment of the patient and the clinical information, please provide the requested documentation by clicking on the appropriate radio button and enter any additional information if prompted.    Question: Sepsis was documented in the medical record. Based on the documentation and the clinical information, can the diagnosis be further clarified as    When answering this query, please exercise your independent professional judgment. The fact that a question is being asked, does not imply that any particular answer is desired or expected.    The patient's clinical indicators include:  Clinical Information: 74 y/o M admitted with MOJGAN and uremia    Documented Diagnosis: \"sepsis\" documented in nephrology consult note on 25 and in PN on 3/1/25 and 3/2/25 by Dr. Jane    Clinical Indicators:  VS on admit: T 97.2, P 71, R 16, 98/57  VS first 12hrs of admit: T 96.8-98.2, P 68-96, R 16-24  CR: 7.86, 5.62, 3.05, 1.87, 1.54  wbc: 12.7, 8.8, 8.8, 8.5, 9.1  platelet: 356, 283, 278, 259, 265  BC: no growth  UC: no growth  neutrophil percent: 71.1  Lactic acid: N/A  Bilirubin: 0.8  Procalcitonin: N/A    ED note  Dr. Peace: \"presents from PCP for low blood pressure in office without significant symptoms. Patient was found to have a blood pressure in the 80s/60s and was sent to the ER for further evaluation.  CBC with a leukocytosis of 12.7.  EKG without evidence of acute injury pattern.  CMP returned notable for significant MOJGAN with a creatinine of 7.86 up from normal 1 month ago.  BUN significantly elevated at " "134.  MOJGAN, uremia\"    care plan 2/27 Dr. Jane: \"Labs demonstrated severe acute kidney injury, mild metabolic acidosis bicarb of 21, urinalysis suggestive of urinary tract infection urine micro positive for granular casts and hyaline cast the findings more suggestive of ATN.  acute uremia. \"    H/P 2/27 S.Fabiana: \"MOJAGN, UTI, leukocytosis\"    nephrology consult 2/28 and PN 3/1, 3/2, 3/3 Dr. Jane: \"Ischemic and infectious related ATN with acute uremia responding to medical management:  Sepsis, urinary tract infection, suspected prostatitis versus malignancy\"    H/P 2/28 and PN 3/3 Dr. Brown: \"UA showed signs of UTI, abdominal CT scan showed enlarged prostate.  MOJGAN, UTI\"    Treatment: daily labs, frequent monitoring of vital signs, nephrology and cardiology consult, UC, BC, 2L NS bolus, IV rocephin, NS at 100ml/hr    Risk Factors: UTI, acute prostatitis, ATN  Options provided:  -- Sepsis was a differential diagnosis and ruled out after study  -- Sepsis 2/2 UTI with renal organ dysfunction of ATN  -- Sepsis 2/2 acute prostatitis with renal organ dysfunction of ATN, Please specify sepsis associated organ dysfunction below  -- Other - I will add my own diagnosis  -- Refer to Clinical Documentation Reviewer    Query created by: Mikki Esqueda on 3/3/2025 2:20 PM      Electronically signed by:  AI BROWN MD 3/4/2025 10:18 PM          "

## 2025-03-05 NOTE — PROGRESS NOTES
Spiritual Care Visit  Spiritual Care Request    Reason for Visit:  Routine Visit: Introduction  Continue Visiting: Yes     Request Received From:       Focus of Care:  Visited With: Patient         Refer to :          Spiritual Care Assessment    Spiritual Assessment:                      Care Provided:  Intended Effects: Aligning care plan with patient's values, Build relationship of care and support, Convey a calming presence, Demonstrate caring and concern, Lessen someone's feelings of isolation, Lessen anxiety, Helping someone feel comforted, Brittani affirmation, Establish rapport and connectedness, Meaning-making, Preserve dignity and respect, Promote sense of peace    Sense of Community and or Rastafari Affiliation:  Yazidi         Addressed Needs/Concerns and/or Ibrahima Through:          Outcome:        Advance Directives:         Spiritual Care Annotation    Annotation:  Nice visit and prayer with patient

## 2025-03-07 LAB
ATRIAL RATE: 72 BPM
ATRIAL RATE: 90 BPM
P AXIS: 58 DEGREES
P AXIS: 76 DEGREES
P OFFSET: 206 MS
P OFFSET: 208 MS
P ONSET: 144 MS
P ONSET: 148 MS
PR INTERVAL: 150 MS
PR INTERVAL: 158 MS
Q ONSET: 223 MS
Q ONSET: 223 MS
QRS COUNT: 12 BEATS
QRS COUNT: 15 BEATS
QRS DURATION: 86 MS
QRS DURATION: 86 MS
QT INTERVAL: 352 MS
QT INTERVAL: 376 MS
QTC CALCULATION(BAZETT): 411 MS
QTC CALCULATION(BAZETT): 430 MS
QTC FREDERICIA: 399 MS
QTC FREDERICIA: 403 MS
R AXIS: 48 DEGREES
R AXIS: 6 DEGREES
T AXIS: 54 DEGREES
T AXIS: 80 DEGREES
T OFFSET: 399 MS
T OFFSET: 411 MS
VENTRICULAR RATE: 72 BPM
VENTRICULAR RATE: 90 BPM

## 2025-03-08 NOTE — DISCHARGE SUMMARY
Discharge Diagnosis  MOJGAN (acute kidney injury) (CMS-[facility])  UTI  Hypomagnesemia  Hypercalcemia  Enlarged Prostate  Atrial fibrillation      Discharge Meds     Medication List      START taking these medications     aspirin 81 mg chewable tablet; Chew 1 tablet (81 mg) once daily.   metoprolol tartrate 25 mg tablet; Commonly known as: Lopressor; Take 0.5   tablets (12.5 mg) by mouth 2 times a day.   oxybutynin 5 mg tablet; Commonly known as: Ditropan; Take 1 tablet (5   mg) by mouth 3 times a day.   tamsulosin 0.4 mg 24 hr capsule; Commonly known as: Flomax; Take 1   capsule (0.4 mg) by mouth once daily.     CHANGE how you take these medications     simvastatin 40 mg tablet; Commonly known as: Zocor; Take 1 tablet (40   mg) by mouth once daily at bedtime.; What changed: medication strength,   how much to take     CONTINUE taking these medications     amLODIPine 10 mg tablet; Commonly known as: Norvasc   dorzolamide-timoloL 22.3-6.8 mg/mL ophthalmic solution; Commonly known   as: Cosopt   latanoprost 0.005 % ophthalmic solution; Commonly known as: Xalatan   sildenafil 100 mg tablet; Commonly known as: Viagra     STOP taking these medications     bacitracin 500 unit/gram ointment   calcium citrate 200 mg (950 mg) tablet; Commonly known as: Calcitrate   hydroCHLOROthiazide 12.5 mg tablet; Commonly known as: Microzide   lisinopril 20 mg tablet   multivitamin tablet       Test Results Pending At Discharge  Pending Labs       No current pending labs.            Hospital Course  Patient is 75-year-old male admitted to the hospital with acute kidney injury, patient was found to have creatinine of 7, and BUN of 34, patient had Kiran catheter and started on IV fluids, kidney function slightly improved, patient was evaluated by nephrology and urology, while in hospital patient went into A-fib with RVR, was eval by cardiology, heart rate good controlled, patient had 2D echo and stress test, patient was cleared to be discharged to  follow-up as an outpatient.    Discharge time 35 minutes.    Pertinent Physical Exam At Time of Discharge  Physical Exam  Constitutional:       Appearance: He is normal weight.   HENT:      Head: Normocephalic and atraumatic.      Mouth/Throat:      Mouth: Mucous membranes are moist.      Pharynx: Oropharynx is clear.   Eyes:      Conjunctiva/sclera: Conjunctivae normal.      Pupils: Pupils are equal, round, and reactive to light.   Cardiovascular:      Rate and Rhythm: Normal rate and regular rhythm.   Pulmonary:      Effort: Pulmonary effort is normal.      Breath sounds: Normal breath sounds.   Abdominal:      General: Abdomen is flat.      Palpations: Abdomen is soft.   Neurological:      General: No focal deficit present.      Mental Status: He is alert. Mental status is at baseline.         Outpatient Follow-Up  No future appointments.      Jose Velazco MD

## 2025-03-14 ENCOUNTER — OFFICE VISIT (OUTPATIENT)
Age: 75
End: 2025-03-14
Payer: MEDICARE

## 2025-03-14 VITALS
HEIGHT: 66 IN | WEIGHT: 236 LBS | BODY MASS INDEX: 37.93 KG/M2 | SYSTOLIC BLOOD PRESSURE: 120 MMHG | DIASTOLIC BLOOD PRESSURE: 78 MMHG | HEART RATE: 72 BPM | RESPIRATION RATE: 16 BRPM

## 2025-03-14 DIAGNOSIS — R79.89 ABNORMAL CBC: ICD-10-CM

## 2025-03-14 DIAGNOSIS — R39.11 BENIGN PROSTATIC HYPERPLASIA WITH URINARY HESITANCY: ICD-10-CM

## 2025-03-14 DIAGNOSIS — R97.20 ABNORMAL PSA: ICD-10-CM

## 2025-03-14 DIAGNOSIS — R35.0 URINARY FREQUENCY: ICD-10-CM

## 2025-03-14 DIAGNOSIS — Z00.00 MEDICARE ANNUAL WELLNESS VISIT, SUBSEQUENT: Primary | ICD-10-CM

## 2025-03-14 DIAGNOSIS — N40.1 BENIGN PROSTATIC HYPERPLASIA WITH URINARY HESITANCY: ICD-10-CM

## 2025-03-14 DIAGNOSIS — R30.0 DYSURIA: ICD-10-CM

## 2025-03-14 DIAGNOSIS — I10 ESSENTIAL HYPERTENSION, BENIGN: ICD-10-CM

## 2025-03-14 DIAGNOSIS — I48.91 NEW ONSET A-FIB (HCC): ICD-10-CM

## 2025-03-14 DIAGNOSIS — E78.5 DYSLIPIDEMIA: ICD-10-CM

## 2025-03-14 PROCEDURE — 3017F COLORECTAL CA SCREEN DOC REV: CPT | Performed by: PHYSICIAN ASSISTANT

## 2025-03-14 PROCEDURE — 1126F AMNT PAIN NOTED NONE PRSNT: CPT | Performed by: PHYSICIAN ASSISTANT

## 2025-03-14 PROCEDURE — 3074F SYST BP LT 130 MM HG: CPT | Performed by: PHYSICIAN ASSISTANT

## 2025-03-14 PROCEDURE — 93000 ELECTROCARDIOGRAM COMPLETE: CPT | Performed by: PHYSICIAN ASSISTANT

## 2025-03-14 PROCEDURE — 3078F DIAST BP <80 MM HG: CPT | Performed by: PHYSICIAN ASSISTANT

## 2025-03-14 PROCEDURE — G0439 PPPS, SUBSEQ VISIT: HCPCS | Performed by: PHYSICIAN ASSISTANT

## 2025-03-14 PROCEDURE — 1159F MED LIST DOCD IN RCRD: CPT | Performed by: PHYSICIAN ASSISTANT

## 2025-03-14 PROCEDURE — 1123F ACP DISCUSS/DSCN MKR DOCD: CPT | Performed by: PHYSICIAN ASSISTANT

## 2025-03-14 RX ORDER — TAMSULOSIN HYDROCHLORIDE 0.4 MG/1
0.4 CAPSULE ORAL DAILY
COMMUNITY
Start: 2025-03-05 | End: 2025-03-15 | Stop reason: SDUPTHER

## 2025-03-14 RX ORDER — OXYBUTYNIN CHLORIDE 5 MG/1
5 TABLET ORAL 3 TIMES DAILY
COMMUNITY
Start: 2025-03-04 | End: 2025-04-03

## 2025-03-14 RX ORDER — METOPROLOL TARTRATE 25 MG/1
12.5 TABLET, FILM COATED ORAL 2 TIMES DAILY
COMMUNITY
Start: 2025-03-04 | End: 2025-03-15 | Stop reason: SDUPTHER

## 2025-03-14 SDOH — ECONOMIC STABILITY: FOOD INSECURITY: WITHIN THE PAST 12 MONTHS, YOU WORRIED THAT YOUR FOOD WOULD RUN OUT BEFORE YOU GOT MONEY TO BUY MORE.: NEVER TRUE

## 2025-03-14 SDOH — ECONOMIC STABILITY: FOOD INSECURITY: WITHIN THE PAST 12 MONTHS, THE FOOD YOU BOUGHT JUST DIDN'T LAST AND YOU DIDN'T HAVE MONEY TO GET MORE.: NEVER TRUE

## 2025-03-14 ASSESSMENT — PATIENT HEALTH QUESTIONNAIRE - PHQ9
SUM OF ALL RESPONSES TO PHQ QUESTIONS 1-9: 0
2. FEELING DOWN, DEPRESSED OR HOPELESS: NOT AT ALL
1. LITTLE INTEREST OR PLEASURE IN DOING THINGS: NOT AT ALL
SUM OF ALL RESPONSES TO PHQ QUESTIONS 1-9: 0

## 2025-03-14 NOTE — PATIENT INSTRUCTIONS
somewhere.  How can you find safe ways to stay active?  Talk with your doctor about any physical challenges you're facing. Make a plan with your doctor if you have a health problem or aren't sure how to get started with activity.  If you're already active, ask your doctor if there is anything you should change to stay safe as your body and health change.  If you tend to feel dizzy after you take medicine, avoid activity at that time. Try being active before you take your medicine. This will reduce your risk of falls.  If you plan to be active at home, make sure to clear your space before you get started. Remove things like TV cords, coffee tables, and throw rugs. It's safest to have plenty of space to move freely.  The key to getting more active is to take it slow and steady. Try to improve only a little bit at a time. Pick just one area to improve on at first. And if an activity hurts, stop and talk to your doctor.  Where can you learn more?  Go to https://www.[employer].net/patientEd and enter P600 to learn more about \"Learning About Being Active as an Older Adult.\"  Current as of: July 31, 2024  Content Version: 14.4  © 2024-2025 [employer].   Care instructions adapted under license by [employer]. If you have questions about a medical condition or this instruction, always ask your healthcare professional. [employer], [employer], disclaims any warranty or liability for your use of this information.         Learning About Dental Care for Older Adults  Dental care for older adults: Overview  Dental care for older people is much the same as for younger adults. But older adults do have concerns that younger adults do not. Older adults may have problems with gum disease and decay on the roots of their teeth. They may need missing teeth replaced or broken fillings fixed. Or they may have dentures that need to be cared for. Some older adults may have trouble holding a toothbrush.  You can help remind the

## 2025-03-14 NOTE — PROGRESS NOTES
Last visit 12/29/20 - follow up 6/29/21 Medicare Annual Wellness Visit    Marcell Qureshi is here for Medicare AWV    Assessment & Plan   Medicare annual wellness visit, subsequent  Abnormal CBC  -     CBC; Future  -     Iron and TIBC; Future  -     Ferritin; Future  Benign prostatic hyperplasia with urinary hesitancy  Dyslipidemia  -     Lipid Panel; Future  -     simvastatin (ZOCOR) 20 MG tablet; Take 1 tablet by mouth nightly at bedtime., Disp-90 tablet, R-4Normal  Essential hypertension, benign  -     EKG 12 Lead; Future  -     Comprehensive Metabolic Panel; Future  Abnormal PSA  -     PSA, Diagnostic; Future  Urinary frequency  -     Culture, Urine; Future  -     Trichomonas Vaginalis Rna, Qual Tma, Pap Via; Future  -     C.trachomatis N.gonorrhoeae DNA; Future  Dysuria  -     C.trachomatis N.gonorrhoeae DNA; Future  New onset a-fib (HCC)  -     Narayan Padilla MD, Cardiology, [place]     Repeat urine studies today.  HOLD ditropan, causing extreme dryness.  Trial flomax every other day due to increased urination/frequency.   Continue 12.5 mg metoprolol, cardiology referral.   Repeat labs, low cbc noted, repeat renal function.  Follow up with me in 4 weeks.     No follow-ups on file.     Subjective   The following acute and/or chronic problems were also addressed today:    Last OV with me: 10/7/24.     Patient had a large penile lesion excised with Dr. Sam Mcfarlane on 1/8/25.  Surgery went well without any known complications.  At last OV on 2/27/25, patient was noted to have extremely low BP.  Was taken to ER from Dr. Mcfarlane's office for evaluation.  Blood pressure was 88/61; pulse 106.  With symptoms.     ER admitted him to [facility] for ARMANDO, uremia, hypercalcemia, hyperkalemia, sepsis due to UTI, new onset afib with RVR.     Patient was admitted on 2/27/25 and discharged home in stable condition 3/4/25.   Was started on ditropan, flomax, metoprolol.  HCTZ and lisinopril were d/c.   Would like to discuss medications today.  Unsure if he needs to

## 2025-03-15 PROBLEM — I48.91 NEW ONSET A-FIB (HCC): Status: ACTIVE | Noted: 2025-03-15

## 2025-03-15 PROBLEM — R30.0 DYSURIA: Status: ACTIVE | Noted: 2025-03-15

## 2025-03-15 PROBLEM — R35.0 URINARY FREQUENCY: Status: ACTIVE | Noted: 2025-03-15

## 2025-03-15 PROBLEM — R39.11 BENIGN PROSTATIC HYPERPLASIA WITH URINARY HESITANCY: Status: ACTIVE | Noted: 2024-01-31

## 2025-03-15 LAB — BACTERIA UR CULT: NORMAL

## 2025-03-15 RX ORDER — SIMVASTATIN 20 MG
20 TABLET ORAL NIGHTLY
Qty: 90 TABLET | Refills: 4 | Status: SHIPPED | OUTPATIENT
Start: 2025-03-15

## 2025-03-15 RX ORDER — ASPIRIN 81 MG/1
81 TABLET ORAL DAILY
Qty: 90 TABLET | Refills: 4 | Status: SHIPPED | OUTPATIENT
Start: 2025-03-15

## 2025-03-15 RX ORDER — TAMSULOSIN HYDROCHLORIDE 0.4 MG/1
0.4 CAPSULE ORAL EVERY OTHER DAY
Qty: 45 CAPSULE | Refills: 2 | Status: SHIPPED | OUTPATIENT
Start: 2025-03-15 | End: 2025-04-14

## 2025-03-15 RX ORDER — METOPROLOL TARTRATE 25 MG/1
12.5 TABLET, FILM COATED ORAL 2 TIMES DAILY
Qty: 45 TABLET | Refills: 2 | Status: SHIPPED | OUTPATIENT
Start: 2025-03-15 | End: 2025-07-28

## 2025-03-16 ENCOUNTER — RESULTS FOLLOW-UP (OUTPATIENT)
Age: 75
End: 2025-03-16

## 2025-03-18 LAB
C TRACH DNA UR QL NAA+PROBE: NEGATIVE
N GONORRHOEA DNA UR QL NAA+PROBE: NEGATIVE
SPECIMEN SOURCE: NORMAL
T VAGINALIS RRNA SPEC QL NAA+PROBE: NEGATIVE

## 2025-03-29 ENCOUNTER — APPOINTMENT (OUTPATIENT)
Dept: ULTRASOUND IMAGING | Age: 75
End: 2025-03-29
Payer: MEDICARE

## 2025-03-29 ENCOUNTER — HOSPITAL ENCOUNTER (EMERGENCY)
Age: 75
Discharge: HOME | End: 2025-03-29
Attending: FAMILY MEDICINE
Payer: MEDICARE

## 2025-03-29 VITALS
BODY MASS INDEX: 35.29 KG/M2 | DIASTOLIC BLOOD PRESSURE: 83 MMHG | SYSTOLIC BLOOD PRESSURE: 151 MMHG | HEART RATE: 76 BPM | HEIGHT: 66 IN | WEIGHT: 219.6 LBS | OXYGEN SATURATION: 98 % | TEMPERATURE: 98.2 F | RESPIRATION RATE: 18 BRPM

## 2025-03-29 DIAGNOSIS — R52 PAIN: Primary | ICD-10-CM

## 2025-03-29 DIAGNOSIS — N45.3 ORCHITIS, EPIDIDYMITIS, AND EPIDIDYMO-ORCHITIS: ICD-10-CM

## 2025-03-29 DIAGNOSIS — N45.2 ORCHITIS, EPIDIDYMITIS, AND EPIDIDYMO-ORCHITIS: ICD-10-CM

## 2025-03-29 DIAGNOSIS — N45.1 ORCHITIS, EPIDIDYMITIS, AND EPIDIDYMO-ORCHITIS: ICD-10-CM

## 2025-03-29 LAB
ALBUMIN SERPL-MCNC: 3.4 G/DL (ref 3.5–4.6)
ALP SERPL-CCNC: 95 U/L (ref 35–104)
ALT SERPL-CCNC: 6 U/L (ref 0–41)
ANION GAP SERPL CALCULATED.3IONS-SCNC: 9 MEQ/L (ref 9–15)
AST SERPL-CCNC: 11 U/L (ref 0–40)
BACTERIA URNS QL MICRO: NEGATIVE /HPF
BASOPHILS # BLD: 0 K/UL (ref 0–0.2)
BASOPHILS NFR BLD: 0.2 %
BILIRUB SERPL-MCNC: 0.8 MG/DL (ref 0.2–0.7)
BILIRUB UR QL STRIP: NEGATIVE
BUN SERPL-MCNC: 8 MG/DL (ref 8–23)
CALCIUM SERPL-MCNC: 10 MG/DL (ref 8.5–9.9)
CHLORIDE SERPL-SCNC: 105 MEQ/L (ref 95–107)
CLARITY UR: ABNORMAL
CO2 SERPL-SCNC: 25 MEQ/L (ref 20–31)
COLOR UR: YELLOW
CREAT SERPL-MCNC: 0.93 MG/DL (ref 0.7–1.2)
EOSINOPHIL # BLD: 0 K/UL (ref 0–0.7)
EOSINOPHIL NFR BLD: 0.4 %
EPI CELLS #/AREA URNS AUTO: ABNORMAL /HPF (ref 0–5)
ERYTHROCYTE [DISTWIDTH] IN BLOOD BY AUTOMATED COUNT: 13.2 % (ref 11.5–14.5)
GLOBULIN SER CALC-MCNC: 3.4 G/DL (ref 2.3–3.5)
GLUCOSE SERPL-MCNC: 122 MG/DL (ref 70–99)
GLUCOSE UR STRIP-MCNC: NEGATIVE MG/DL
HCT VFR BLD AUTO: 33.4 % (ref 42–52)
HGB BLD-MCNC: 10.2 G/DL (ref 14–18)
HGB UR QL STRIP: ABNORMAL
HYALINE CASTS #/AREA URNS AUTO: ABNORMAL /HPF (ref 0–5)
KETONES UR STRIP-MCNC: NEGATIVE MG/DL
LACTIC ACID, SEPSIS: 1.2 MMOL/L (ref 0.5–1.9)
LEUKOCYTE ESTERASE UR QL STRIP: ABNORMAL
LYMPHOCYTES # BLD: 2.2 K/UL (ref 1–4.8)
LYMPHOCYTES NFR BLD: 21.1 %
MAGNESIUM SERPL-MCNC: 1.9 MG/DL (ref 1.7–2.4)
MCH RBC QN AUTO: 28 PG (ref 27–31.3)
MCHC RBC AUTO-ENTMCNC: 30.5 % (ref 33–37)
MCV RBC AUTO: 91.8 FL (ref 79–92.2)
MONOCYTES # BLD: 1 K/UL (ref 0.2–0.8)
MONOCYTES NFR BLD: 10 %
NEUTROPHILS # BLD: 7 K/UL (ref 1.4–6.5)
NEUTS SEG NFR BLD: 68 %
NITRITE UR QL STRIP: NEGATIVE
PH UR STRIP: 7 [PH] (ref 5–9)
PLATELET # BLD AUTO: 254 K/UL (ref 130–400)
POTASSIUM SERPL-SCNC: 3.3 MEQ/L (ref 3.4–4.9)
PROT SERPL-MCNC: 6.8 G/DL (ref 6.3–8)
PROT UR STRIP-MCNC: 30 MG/DL
RBC # BLD AUTO: 3.64 M/UL (ref 4.7–6.1)
RBC #/AREA URNS AUTO: ABNORMAL /HPF (ref 0–5)
SODIUM SERPL-SCNC: 139 MEQ/L (ref 135–144)
SP GR UR STRIP: 1.02 (ref 1–1.03)
URINE REFLEX TO CULTURE: YES
UROBILINOGEN UR STRIP-ACNC: 2 E.U./DL
WBC # BLD AUTO: 10.3 K/UL (ref 4.8–10.8)
WBC #/AREA URNS AUTO: >100 /HPF (ref 0–5)

## 2025-03-29 PROCEDURE — 83735 ASSAY OF MAGNESIUM: CPT

## 2025-03-29 PROCEDURE — 85025 COMPLETE CBC W/AUTO DIFF WBC: CPT

## 2025-03-29 PROCEDURE — 83605 ASSAY OF LACTIC ACID: CPT

## 2025-03-29 PROCEDURE — 76870 US EXAM SCROTUM: CPT

## 2025-03-29 PROCEDURE — 81001 URINALYSIS AUTO W/SCOPE: CPT

## 2025-03-29 PROCEDURE — 87086 URINE CULTURE/COLONY COUNT: CPT

## 2025-03-29 PROCEDURE — 6370000000 HC RX 637 (ALT 250 FOR IP): Performed by: FAMILY MEDICINE

## 2025-03-29 PROCEDURE — 99284 EMERGENCY DEPT VISIT MOD MDM: CPT

## 2025-03-29 PROCEDURE — 80053 COMPREHEN METABOLIC PANEL: CPT

## 2025-03-29 PROCEDURE — 93975 VASCULAR STUDY: CPT

## 2025-03-29 RX ORDER — CIPROFLOXACIN 500 MG/1
500 TABLET, FILM COATED ORAL ONCE
Status: COMPLETED | OUTPATIENT
Start: 2025-03-29 | End: 2025-03-29

## 2025-03-29 RX ORDER — CIPROFLOXACIN 500 MG/1
500 TABLET, FILM COATED ORAL 2 TIMES DAILY
Qty: 14 TABLET | Refills: 0 | Status: SHIPPED | OUTPATIENT
Start: 2025-03-29 | End: 2025-04-05

## 2025-03-29 RX ADMIN — CIPROFLOXACIN HYDROCHLORIDE 500 MG: 500 TABLET, FILM COATED ORAL at 15:23

## 2025-03-29 ASSESSMENT — PAIN DESCRIPTION - ORIENTATION: ORIENTATION: RIGHT

## 2025-03-29 ASSESSMENT — PAIN DESCRIPTION - PAIN TYPE: TYPE: ACUTE PAIN

## 2025-03-29 ASSESSMENT — ENCOUNTER SYMPTOMS: RESPIRATORY NEGATIVE: 1

## 2025-03-29 ASSESSMENT — PAIN - FUNCTIONAL ASSESSMENT: PAIN_FUNCTIONAL_ASSESSMENT: 0-10

## 2025-03-29 ASSESSMENT — PAIN DESCRIPTION - LOCATION: LOCATION: GROIN

## 2025-03-29 ASSESSMENT — PAIN SCALES - GENERAL: PAINLEVEL_OUTOF10: 7

## 2025-03-29 ASSESSMENT — PAIN DESCRIPTION - FREQUENCY: FREQUENCY: CONTINUOUS

## 2025-03-29 NOTE — ED PROVIDER NOTES
[facility] EMERGENCY DEPARTMENT  eMERGENCY dEPARTMENT eNCOUnter      Pt Name: Marcell Qureshi  MRN: [id number]  Birthdate 1950  Date of evaluation: 3/29/2025  Provider: MIMI RUIZ MD  3:29 PM EDT     CHIEF COMPLAINT       Chief Complaint   Patient presents with    Groin Swelling     Rt testis         HISTORY OF PRESENT ILLNESS   (Location/Symptom, Timing/Onset,Context/Setting, Quality, Duration, Modifying Factors, Severity)  Note limiting factors.   Marcell Qureshi is a 75 y.o. male who presents to the emergency department scrotal pain      Marcell Qureshi is a 75 y.o. male past medical history of diabetes and hypertension.  With pain swelling and tenderness of right scrotum x 2 to 3 days.  Patient admits to dysuria no fever no chills no body ache no blood in the urine some mild incontinence no other complaint or concern      The history is provided by the patient.       NursingNotes were reviewed.    REVIEW OF SYSTEMS    (2-9 systems for level 4, 10 or more for level 5)     Review of Systems   Constitutional: Negative.    HENT: Negative.     Respiratory: Negative.     Cardiovascular: Negative.    Genitourinary:  Positive for difficulty urinating, dysuria and testicular pain.   Skin: Negative.    Psychiatric/Behavioral: Negative.         Except as noted above the remainder of the review of systems was reviewed and negative.       PAST MEDICAL HISTORY     Past Medical History:   Diagnosis Date    Diabetes mellitus, type 2 (HCC) 10/11/2011    pt relates he was borderline at one time and now denies being diabetic and relates his physician has not prescribed any meds or blood sugar monitoring    Dyslipidemia 10/11/2011    Erectile dysfunction 7/19/2012    Essential hypertension, benign 10/11/2011    Glaucoma     History of colon polyps     adenomatous polyps         SURGICALHISTORY       Past Surgical History:   Procedure Laterality Date    CATARACT REMOVAL Right 2/17/15    [facility]    COLONOSCOPY  9/26/11    Dr Browning

## 2025-03-29 NOTE — ED TRIAGE NOTES
Pt in with c/o rt testis swelling for the last week. Pt states it is increasing, states increased urination, urgency and frequency.

## 2025-03-30 LAB — BACTERIA UR CULT: NORMAL

## 2025-04-02 ENCOUNTER — TELEPHONE (OUTPATIENT)
Age: 75
End: 2025-04-02

## 2025-04-02 ENCOUNTER — OFFICE VISIT (OUTPATIENT)
Age: 75
End: 2025-04-02
Payer: MEDICARE

## 2025-04-02 VITALS
BODY MASS INDEX: 35.2 KG/M2 | HEIGHT: 66 IN | SYSTOLIC BLOOD PRESSURE: 138 MMHG | WEIGHT: 219 LBS | HEART RATE: 61 BPM | DIASTOLIC BLOOD PRESSURE: 80 MMHG

## 2025-04-02 DIAGNOSIS — N13.8 BENIGN PROSTATIC HYPERPLASIA WITH URINARY OBSTRUCTION: ICD-10-CM

## 2025-04-02 DIAGNOSIS — N45.3 EPIDIDYMO-ORCHITIS: Primary | ICD-10-CM

## 2025-04-02 DIAGNOSIS — N40.1 BENIGN PROSTATIC HYPERPLASIA WITH URINARY OBSTRUCTION: ICD-10-CM

## 2025-04-02 PROCEDURE — 99213 OFFICE O/P EST LOW 20 MIN: CPT | Performed by: PHYSICIAN ASSISTANT

## 2025-04-02 PROCEDURE — 3075F SYST BP GE 130 - 139MM HG: CPT | Performed by: PHYSICIAN ASSISTANT

## 2025-04-02 PROCEDURE — G8427 DOCREV CUR MEDS BY ELIG CLIN: HCPCS | Performed by: PHYSICIAN ASSISTANT

## 2025-04-02 PROCEDURE — G8417 CALC BMI ABV UP PARAM F/U: HCPCS | Performed by: PHYSICIAN ASSISTANT

## 2025-04-02 PROCEDURE — 1036F TOBACCO NON-USER: CPT | Performed by: PHYSICIAN ASSISTANT

## 2025-04-02 PROCEDURE — 1159F MED LIST DOCD IN RCRD: CPT | Performed by: PHYSICIAN ASSISTANT

## 2025-04-02 PROCEDURE — 3017F COLORECTAL CA SCREEN DOC REV: CPT | Performed by: PHYSICIAN ASSISTANT

## 2025-04-02 PROCEDURE — 3079F DIAST BP 80-89 MM HG: CPT | Performed by: PHYSICIAN ASSISTANT

## 2025-04-02 PROCEDURE — 1160F RVW MEDS BY RX/DR IN RCRD: CPT | Performed by: PHYSICIAN ASSISTANT

## 2025-04-02 PROCEDURE — 1123F ACP DISCUSS/DSCN MKR DOCD: CPT | Performed by: PHYSICIAN ASSISTANT

## 2025-04-02 RX ORDER — CIPROFLOXACIN 500 MG/1
500 TABLET, FILM COATED ORAL 2 TIMES DAILY
Qty: 42 TABLET | Refills: 0 | Status: SHIPPED | OUTPATIENT
Start: 2025-04-02 | End: 2025-04-02 | Stop reason: CLARIF

## 2025-04-02 RX ORDER — CIPROFLOXACIN 500 MG/1
500 TABLET, FILM COATED ORAL 2 TIMES DAILY
Qty: 28 TABLET | Refills: 0 | Status: SHIPPED | OUTPATIENT
Start: 2025-04-02 | End: 2025-04-16

## 2025-04-02 RX ORDER — TAMSULOSIN HYDROCHLORIDE 0.4 MG/1
0.4 CAPSULE ORAL DAILY
Qty: 90 CAPSULE | Refills: 1 | Status: SHIPPED | OUTPATIENT
Start: 2025-04-02

## 2025-04-02 ASSESSMENT — ENCOUNTER SYMPTOMS: APNEA: 0

## 2025-04-02 NOTE — PROGRESS NOTES
Subjective:      Patient ID: Marcell Qureshi is a 75 y.o. male    HPI 75 year old male who was diagnosed with epididymo-orchitis on 3/29/25 in the ED. He was treated with one week of Cipro. His scrotum is still erythematous and edematous. He denies fevers and chills. He denies gross hematuria and dysuria. 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 PSA was 6.20 on 24.    Past Medical History:   Diagnosis Date    Diabetes mellitus, type 2 (HCC) 10/11/2011    pt relates he was borderline at one time and now denies being diabetic and relates his physician has not prescribed any meds or blood sugar monitoring    Dyslipidemia 10/11/2011    Erectile dysfunction 2012    Essential hypertension, benign 10/11/2011    Glaucoma     History of colon polyps     adenomatous polyps     Past Surgical History:   Procedure Laterality Date    CATARACT REMOVAL Right 2/17/15    [facility]    COLONOSCOPY  11    Dr Browning    COLONOSCOPY N/A 2016    COLONOSCOPY performed by Sebas [facility] MD at [facility]    COLONOSCOPY N/A 2020    COLONOSCOPY WITH POLYPECTOMY performed by Toro Painter MD at [facility]    TRABECULECTOMY Right 14    [facility]     Social History     Socioeconomic History    Marital status: Single     Spouse name: None    Number of children: None    Years of education: None    Highest education level: None   Tobacco Use    Smoking status: Former     Current packs/day: 0.00     Average packs/day: 1.5 packs/day for 30.0 years (45.0 ttl pk-yrs)     Types: Cigarettes     Start date: 1970     Quit date: 2000     Years since quittin.2    Smokeless tobacco: Never   Vaping Use    Vaping status: Never Used   Substance and Sexual Activity    Alcohol use: No     Social Drivers of Health     Financial Resource Strain: Low Risk  (2025)    Received from [facility]    Overall Financial Resource Strain (CARDIA)     Difficulty of

## 2025-04-02 NOTE — PROGRESS NOTES
Subjective:      Patient ID: Marcell Qureshi is a 75 y.o. male    HPI    Past Medical History:   Diagnosis Date    Diabetes mellitus, type 2 (HCC) 10/11/2011    pt relates he was borderline at one time and now denies being diabetic and relates his physician has not prescribed any meds or blood sugar monitoring    Dyslipidemia 10/11/2011    Erectile dysfunction 2012    Essential hypertension, benign 10/11/2011    Glaucoma     History of colon polyps     adenomatous polyps     Past Surgical History:   Procedure Laterality Date    CATARACT REMOVAL Right 2/17/15    [facility]    COLONOSCOPY  11    Dr Browning    COLONOSCOPY N/A 2016    COLONOSCOPY performed by [facility] MD at [facility]    COLONOSCOPY N/A 2020    COLONOSCOPY WITH POLYPECTOMY performed by Toro Painter MD at [facility]    TRABECULECTOMY Right 14    [facility]     Social History     Socioeconomic History    Marital status: Single   Tobacco Use    Smoking status: Former     Current packs/day: 0.00     Average packs/day: 1.5 packs/day for 30.0 years (45.0 ttl pk-yrs)     Types: Cigarettes     Start date: 1970     Quit date: 2000     Years since quittin.2    Smokeless tobacco: Never   Vaping Use    Vaping status: Never Used   Substance and Sexual Activity    Alcohol use: No     Social Drivers of Health     Financial Resource Strain: Low Risk  (2025)    Received from [facility]    Overall Financial Resource Strain (CARDIA)     Difficulty of Paying Living Expenses: Not hard at all   Food Insecurity: No Food Insecurity (3/14/2025)    Hunger Vital Sign     Worried About Running Out of Food in the Last Year: Never true     Ran Out of Food in the Last Year: Never true   Transportation Needs: No Transportation Needs (3/14/2025)    PRAPARE - Transportation     Lack of Transportation (Medical): No     Lack of Transportation (Non-Medical): No   Physical Activity: Inactive (3/14/2025)

## 2025-04-02 NOTE — TELEPHONE ENCOUNTER
Patient states he needs a medication refill. He does not know what medication he needs. He will follow up with the pharmacy.    Please advise    Callback [phone]

## 2025-04-03 DIAGNOSIS — E78.5 DYSLIPIDEMIA: ICD-10-CM

## 2025-04-03 RX ORDER — METOPROLOL TARTRATE 25 MG/1
12.5 TABLET, FILM COATED ORAL 2 TIMES DAILY
Qty: 45 TABLET | Refills: 2 | Status: SHIPPED | OUTPATIENT
Start: 2025-04-03 | End: 2025-04-04

## 2025-04-03 RX ORDER — ASPIRIN 81 MG/1
81 TABLET ORAL DAILY
Qty: 90 TABLET | Refills: 4 | Status: SHIPPED | OUTPATIENT
Start: 2025-04-03

## 2025-04-03 NOTE — TELEPHONE ENCOUNTER
Comments:     Last Office Visit (last PCP visit):   3/14/2025    Next Visit Date:  Future Appointments   Date Time Provider Department Center   5/6/2025  2:15 PM Nandiin Cao PA-C [facility] DEP   5/28/2025  3:00 PM Michael Osei PA [place] [facility]       **If hasn't been seen in over a year OR hasn't followed up according to last diabetes/ADHD visit, make appointment for patient before sending refill to provider.    Rx requested:  Requested Prescriptions     Pending Prescriptions Disp Refills    metoprolol tartrate (LOPRESSOR) 25 MG tablet [Pharmacy Med Name: METOPROLOL TARTRATE 25MG TABLETS] 90 tablet      Sig: TAKE 1/2 TABLET BY MOUTH TWICE DAILY    simvastatin (ZOCOR) 20 MG tablet 90 tablet 4     Sig: Take 1 tablet by mouth nightly at bedtime.

## 2025-04-03 NOTE — TELEPHONE ENCOUNTER
Comments:     Last Office Visit (last PCP visit):   3/14/2025    Next Visit Date:  Future Appointments   Date Time Provider Department Center   5/6/2025  2:15 PM Nandini Cao PA-C [facility] DEP   5/28/2025  3:00 PM Michael Osei PA [place] [facility]         Rx requested:  Requested Prescriptions     Pending Prescriptions Disp Refills    metoprolol tartrate (LOPRESSOR) 25 MG tablet 45 tablet 2     Sig: Take 0.5 tablets by mouth 2 times daily    aspirin 81 MG EC tablet 90 tablet 4     Sig: Take 1 tablet by mouth daily

## 2025-04-04 RX ORDER — SIMVASTATIN 20 MG
20 TABLET ORAL NIGHTLY
Qty: 90 TABLET | Refills: 4 | Status: SHIPPED | OUTPATIENT
Start: 2025-04-04

## 2025-04-04 RX ORDER — METOPROLOL TARTRATE 25 MG/1
12.5 TABLET, FILM COATED ORAL 2 TIMES DAILY
Qty: 90 TABLET | Refills: 1 | Status: SHIPPED | OUTPATIENT
Start: 2025-04-04

## 2025-05-02 ENCOUNTER — OFFICE VISIT (OUTPATIENT)
Age: 75
End: 2025-05-02
Payer: MEDICARE

## 2025-05-02 ENCOUNTER — RESULTS FOLLOW-UP (OUTPATIENT)
Age: 75
End: 2025-05-02

## 2025-05-02 ENCOUNTER — HOSPITAL ENCOUNTER (OUTPATIENT)
Dept: GENERAL RADIOLOGY | Age: 75
Discharge: HOME | End: 2025-05-02
Payer: MEDICARE

## 2025-05-02 VITALS
HEIGHT: 66 IN | BODY MASS INDEX: 34.07 KG/M2 | HEART RATE: 98 BPM | DIASTOLIC BLOOD PRESSURE: 80 MMHG | WEIGHT: 212 LBS | SYSTOLIC BLOOD PRESSURE: 130 MMHG | OXYGEN SATURATION: 99 % | RESPIRATION RATE: 18 BRPM

## 2025-05-02 DIAGNOSIS — I48.91 NEW ONSET A-FIB (HCC): ICD-10-CM

## 2025-05-02 DIAGNOSIS — Z87.440 RECENT URINARY TRACT INFECTION: ICD-10-CM

## 2025-05-02 DIAGNOSIS — R06.02 SOB (SHORTNESS OF BREATH) ON EXERTION: ICD-10-CM

## 2025-05-02 DIAGNOSIS — R82.90 UNSPECIFIED ABNORMAL FINDINGS IN URINE: ICD-10-CM

## 2025-05-02 DIAGNOSIS — R06.02 SOB (SHORTNESS OF BREATH) ON EXERTION: Primary | ICD-10-CM

## 2025-05-02 DIAGNOSIS — E83.52 HYPERCALCEMIA: ICD-10-CM

## 2025-05-02 LAB
ALBUMIN SERPL-MCNC: 3.6 G/DL (ref 3.5–4.6)
ALP SERPL-CCNC: 88 U/L (ref 35–104)
ALT SERPL-CCNC: 8 U/L (ref 0–41)
ANION GAP SERPL CALCULATED.3IONS-SCNC: 8 MEQ/L (ref 9–15)
AST SERPL-CCNC: 14 U/L (ref 0–40)
BASOPHILS # BLD: 0.1 K/UL (ref 0–0.2)
BASOPHILS NFR BLD: 0.9 %
BILIRUB SERPL-MCNC: 0.6 MG/DL (ref 0.2–0.7)
BILIRUBIN, POC: NORMAL
BLOOD URINE, POC: NORMAL
BUN SERPL-MCNC: 13 MG/DL (ref 8–23)
CALCIUM SERPL-MCNC: 10 MG/DL (ref 8.5–9.9)
CHLORIDE SERPL-SCNC: 104 MEQ/L (ref 95–107)
CLARITY, POC: CLEAR
CO2 SERPL-SCNC: 26 MEQ/L (ref 20–31)
COLOR, POC: NORMAL
CREAT SERPL-MCNC: 1.05 MG/DL (ref 0.7–1.2)
EOSINOPHIL # BLD: 0.2 K/UL (ref 0–0.7)
EOSINOPHIL NFR BLD: 2.6 %
ERYTHROCYTE [DISTWIDTH] IN BLOOD BY AUTOMATED COUNT: 13.1 % (ref 11.5–14.5)
GLOBULIN SER CALC-MCNC: 3.4 G/DL (ref 2.3–3.5)
GLUCOSE SERPL-MCNC: 110 MG/DL (ref 70–99)
GLUCOSE URINE, POC: NORMAL MG/DL
HCT VFR BLD AUTO: 40.9 % (ref 42–52)
HGB BLD-MCNC: 12.5 G/DL (ref 14–18)
KETONES, POC: NORMAL MG/DL
LEUKOCYTE EST, POC: NORMAL
LYMPHOCYTES # BLD: 2.6 K/UL (ref 1–4.8)
LYMPHOCYTES NFR BLD: 38.7 %
MCH RBC QN AUTO: 27.7 PG (ref 27–31.3)
MCHC RBC AUTO-ENTMCNC: 30.6 % (ref 33–37)
MCV RBC AUTO: 90.5 FL (ref 79–92.2)
MONOCYTES # BLD: 0.5 K/UL (ref 0.2–0.8)
MONOCYTES NFR BLD: 7.5 %
NEUTROPHILS # BLD: 3.3 K/UL (ref 1.4–6.5)
NEUTS SEG NFR BLD: 50.1 %
NITRITE, POC: NORMAL
PH, POC: 6
PLATELET # BLD AUTO: 241 K/UL (ref 130–400)
POTASSIUM SERPL-SCNC: 3.8 MEQ/L (ref 3.4–4.9)
PROT SERPL-MCNC: 7 G/DL (ref 6.3–8)
PROTEIN, POC: 100 MG/DL
RBC # BLD AUTO: 4.52 M/UL (ref 4.7–6.1)
SODIUM SERPL-SCNC: 138 MEQ/L (ref 135–144)
SPECIFIC GRAVITY, POC: 1.02
UROBILINOGEN, POC: 0.2 MG/DL
WBC # BLD AUTO: 6.6 K/UL (ref 4.8–10.8)

## 2025-05-02 PROCEDURE — 3079F DIAST BP 80-89 MM HG: CPT | Performed by: NURSE PRACTITIONER

## 2025-05-02 PROCEDURE — 1160F RVW MEDS BY RX/DR IN RCRD: CPT | Performed by: NURSE PRACTITIONER

## 2025-05-02 PROCEDURE — 99214 OFFICE O/P EST MOD 30 MIN: CPT | Performed by: NURSE PRACTITIONER

## 2025-05-02 PROCEDURE — 1123F ACP DISCUSS/DSCN MKR DOCD: CPT | Performed by: NURSE PRACTITIONER

## 2025-05-02 PROCEDURE — 81003 URINALYSIS AUTO W/O SCOPE: CPT | Performed by: NURSE PRACTITIONER

## 2025-05-02 PROCEDURE — 3017F COLORECTAL CA SCREEN DOC REV: CPT | Performed by: NURSE PRACTITIONER

## 2025-05-02 PROCEDURE — G8427 DOCREV CUR MEDS BY ELIG CLIN: HCPCS | Performed by: NURSE PRACTITIONER

## 2025-05-02 PROCEDURE — 1159F MED LIST DOCD IN RCRD: CPT | Performed by: NURSE PRACTITIONER

## 2025-05-02 PROCEDURE — G8417 CALC BMI ABV UP PARAM F/U: HCPCS | Performed by: NURSE PRACTITIONER

## 2025-05-02 PROCEDURE — 1036F TOBACCO NON-USER: CPT | Performed by: NURSE PRACTITIONER

## 2025-05-02 PROCEDURE — 3075F SYST BP GE 130 - 139MM HG: CPT | Performed by: NURSE PRACTITIONER

## 2025-05-02 PROCEDURE — 71046 X-RAY EXAM CHEST 2 VIEWS: CPT

## 2025-05-02 NOTE — PROGRESS NOTES
Micro (Auto)    Culture, Urine    XR CHEST STANDARD (2 VW)    CBC with Auto Differential    Comprehensive Metabolic Panel    Culture, Blood 1    Culture, Blood 2      2. New onset a-fib (HCC)        3. Recent urinary tract infection        4. Unspecified abnormal findings in urine  Culture, Urine      5. Hypercalcemia          Orders Placed This Encounter   Procedures    Culture, Urine     Standing Status:   Future     Number of Occurrences:   1     Expected Date:   5/2/2025     Expiration Date:   5/2/2026    Culture, Blood 1     Standing Status:   Future     Number of Occurrences:   1     Expected Date:   5/2/2025     Expiration Date:   5/2/2026    Culture, Blood 2     Standing Status:   Future     Number of Occurrences:   1     Expected Date:   5/2/2025     Expiration Date:   5/2/2026     Source::   Blood    XR CHEST STANDARD (2 VW)     Standing Status:   Future     Number of Occurrences:   1     Expected Date:   5/2/2025     Expiration Date:   5/2/2026     Reason for exam::   SOB    CBC with Auto Differential     Standing Status:   Future     Number of Occurrences:   1     Expected Date:   5/2/2025     Expiration Date:   5/2/2026    Comprehensive Metabolic Panel     Standing Status:   Future     Number of Occurrences:   1     Expected Date:   5/2/2025     Expiration Date:   5/2/2026    POCT Urinalysis No Micro (Auto)     No orders of the defined types were placed in this encounter.    Return if symptoms worsen or fail to improve, for follow up with PCP.    Reviewed with the patient: current clinical status & medications. Side effects, adverse effects of themedication prescribed today, as well as treatment plan/rationale and result expectations have been discussed with the patient who expressed understanding.    Close follow up to evaluate treatment results and for coordination of care.  I have reviewed the patient's medical history in detail and updated the computerized patient record.      Leonora Greco,

## 2025-05-03 LAB — BACTERIA UR CULT: NORMAL

## 2025-05-05 ENCOUNTER — TELEPHONE (OUTPATIENT)
Age: 75
End: 2025-05-05

## 2025-05-05 ASSESSMENT — ENCOUNTER SYMPTOMS
CHEST TIGHTNESS: 0
WHEEZING: 0

## 2025-05-05 NOTE — TELEPHONE ENCOUNTER
Walk-in clinic office called over to [facility] this morning to check if sooner availability for patient. Pt was able to be scheduled today, but patient declines sooner appt & states he is unable to make an appt with only 2.5 hours of time to get here. States he will keep appt 5/22/25.

## 2025-05-07 LAB
BACTERIA BLD CULT ORG #2: NORMAL
BACTERIA BLD CULT: NORMAL

## 2025-05-14 ENCOUNTER — OFFICE VISIT (OUTPATIENT)
Age: 75
End: 2025-05-14
Payer: MEDICARE

## 2025-05-14 VITALS
DIASTOLIC BLOOD PRESSURE: 82 MMHG | HEART RATE: 53 BPM | WEIGHT: 212 LBS | OXYGEN SATURATION: 97 % | HEIGHT: 66 IN | BODY MASS INDEX: 34.07 KG/M2 | RESPIRATION RATE: 16 BRPM | SYSTOLIC BLOOD PRESSURE: 120 MMHG

## 2025-05-14 DIAGNOSIS — R35.0 URINARY FREQUENCY: ICD-10-CM

## 2025-05-14 DIAGNOSIS — E78.5 DYSLIPIDEMIA: ICD-10-CM

## 2025-05-14 DIAGNOSIS — I48.91 NEW ONSET A-FIB (HCC): Primary | ICD-10-CM

## 2025-05-14 DIAGNOSIS — R06.02 SHORTNESS OF BREATH: ICD-10-CM

## 2025-05-14 PROCEDURE — 93000 ELECTROCARDIOGRAM COMPLETE: CPT | Performed by: PHYSICIAN ASSISTANT

## 2025-05-14 PROCEDURE — 3074F SYST BP LT 130 MM HG: CPT | Performed by: PHYSICIAN ASSISTANT

## 2025-05-14 PROCEDURE — 1159F MED LIST DOCD IN RCRD: CPT | Performed by: PHYSICIAN ASSISTANT

## 2025-05-14 PROCEDURE — 3017F COLORECTAL CA SCREEN DOC REV: CPT | Performed by: PHYSICIAN ASSISTANT

## 2025-05-14 PROCEDURE — 1123F ACP DISCUSS/DSCN MKR DOCD: CPT | Performed by: PHYSICIAN ASSISTANT

## 2025-05-14 PROCEDURE — 1036F TOBACCO NON-USER: CPT | Performed by: PHYSICIAN ASSISTANT

## 2025-05-14 PROCEDURE — G8427 DOCREV CUR MEDS BY ELIG CLIN: HCPCS | Performed by: PHYSICIAN ASSISTANT

## 2025-05-14 PROCEDURE — 99214 OFFICE O/P EST MOD 30 MIN: CPT | Performed by: PHYSICIAN ASSISTANT

## 2025-05-14 PROCEDURE — 3079F DIAST BP 80-89 MM HG: CPT | Performed by: PHYSICIAN ASSISTANT

## 2025-05-14 PROCEDURE — G2211 COMPLEX E/M VISIT ADD ON: HCPCS | Performed by: PHYSICIAN ASSISTANT

## 2025-05-14 PROCEDURE — 1126F AMNT PAIN NOTED NONE PRSNT: CPT | Performed by: PHYSICIAN ASSISTANT

## 2025-05-14 PROCEDURE — G8417 CALC BMI ABV UP PARAM F/U: HCPCS | Performed by: PHYSICIAN ASSISTANT

## 2025-05-14 ASSESSMENT — ENCOUNTER SYMPTOMS
ABDOMINAL DISTENTION: 0
VOICE CHANGE: 0
SINUS PRESSURE: 0
ABDOMINAL PAIN: 0
WHEEZING: 0
CHEST TIGHTNESS: 0
TROUBLE SWALLOWING: 0
SHORTNESS OF BREATH: 1

## 2025-05-14 NOTE — PROGRESS NOTES
Subjective  Marcell Qureshi, 75 y.o. male presents today with:  Chief Complaint   Patient presents with    Follow-up     1 month follow up    Shortness of Breath     Pt states he has been feeling short of breath when waking up in the morning        HPI  4 week follow up.  Last OV with me: 3/14/25.    He had a new patient visit with urology on 4/2/25.   Resolution of ARMANDO with acute dehydration and urinary retention.   Patient reports that he has been urinating well.  He was given a Rx for cipro, and told to follow up in 8 weeks.  This is scheduled for end of the month.  Reports no scrotal pain, no edema.   Has urinary leakage, would like to stop flomax.    C/o worsening SOB and RICHARDSON/fatigue.   This has recently worsened, he presented to walk-in clinic on 5/2/25 for SOB hoping to get an inhaler.  CXR was completed, culture.  Negative.  In the office to discuss.   Has upcoming ov with Dr. Musa for new onset afib.   He remains on 12.5 mg metprolol.    81 mg asa.  No chest pain.  No edema of le.   Feels like he gets fatigued easily.  No currently exercising.     Review of Systems   Constitutional:  Positive for activity change and fatigue.   HENT:  Negative for congestion, postnasal drip, sinus pressure, tinnitus, trouble swallowing and voice change.    Respiratory:  Positive for shortness of breath. Negative for chest tightness and wheezing.    Cardiovascular:  Positive for palpitations. Negative for chest pain and leg swelling.   Gastrointestinal:  Negative for abdominal distention and abdominal pain.   Genitourinary:  Positive for urgency. Negative for decreased urine volume, difficulty urinating, dysuria, enuresis, flank pain, frequency, genital sores, hematuria, penile discharge, penile pain and penile swelling.   Musculoskeletal:  Negative for arthralgias.   Neurological:  Negative for dizziness, tremors, seizures, speech difficulty, weakness, light-headedness, numbness and headaches.   Psychiatric/Behavioral:

## 2025-05-16 LAB — BACTERIA UR CULT: NORMAL

## 2025-05-17 ENCOUNTER — RESULTS FOLLOW-UP (OUTPATIENT)
Age: 75
End: 2025-05-17

## 2025-05-22 ENCOUNTER — OFFICE VISIT (OUTPATIENT)
Age: 75
End: 2025-05-22
Payer: MEDICARE

## 2025-05-22 VITALS
DIASTOLIC BLOOD PRESSURE: 84 MMHG | OXYGEN SATURATION: 97 % | BODY MASS INDEX: 34.35 KG/M2 | WEIGHT: 212.8 LBS | SYSTOLIC BLOOD PRESSURE: 134 MMHG | HEART RATE: 55 BPM

## 2025-05-22 DIAGNOSIS — G47.33 OSA (OBSTRUCTIVE SLEEP APNEA): ICD-10-CM

## 2025-05-22 DIAGNOSIS — F17.200 SMOKER: ICD-10-CM

## 2025-05-22 DIAGNOSIS — I48.0 PAF (PAROXYSMAL ATRIAL FIBRILLATION) (HCC): ICD-10-CM

## 2025-05-22 DIAGNOSIS — E78.5 DYSLIPIDEMIA: ICD-10-CM

## 2025-05-22 DIAGNOSIS — Z78.9 STATIN INTOLERANCE: Primary | ICD-10-CM

## 2025-05-22 PROCEDURE — 3075F SYST BP GE 130 - 139MM HG: CPT | Performed by: INTERNAL MEDICINE

## 2025-05-22 PROCEDURE — G8427 DOCREV CUR MEDS BY ELIG CLIN: HCPCS | Performed by: INTERNAL MEDICINE

## 2025-05-22 PROCEDURE — 1036F TOBACCO NON-USER: CPT | Performed by: INTERNAL MEDICINE

## 2025-05-22 PROCEDURE — 1159F MED LIST DOCD IN RCRD: CPT | Performed by: INTERNAL MEDICINE

## 2025-05-22 PROCEDURE — 99214 OFFICE O/P EST MOD 30 MIN: CPT | Performed by: INTERNAL MEDICINE

## 2025-05-22 PROCEDURE — 1123F ACP DISCUSS/DSCN MKR DOCD: CPT | Performed by: INTERNAL MEDICINE

## 2025-05-22 PROCEDURE — 3079F DIAST BP 80-89 MM HG: CPT | Performed by: INTERNAL MEDICINE

## 2025-05-22 PROCEDURE — 3017F COLORECTAL CA SCREEN DOC REV: CPT | Performed by: INTERNAL MEDICINE

## 2025-05-22 PROCEDURE — G8417 CALC BMI ABV UP PARAM F/U: HCPCS | Performed by: INTERNAL MEDICINE

## 2025-05-22 RX ORDER — ASPIRIN 81 MG/1
81 TABLET ORAL DAILY
COMMUNITY

## 2025-05-22 RX ORDER — TAMSULOSIN HYDROCHLORIDE 0.4 MG/1
0.4 CAPSULE ORAL DAILY
COMMUNITY

## 2025-05-22 ASSESSMENT — ENCOUNTER SYMPTOMS
WHEEZING: 0
STRIDOR: 0
SHORTNESS OF BREATH: 0
GASTROINTESTINAL NEGATIVE: 1
BLOOD IN STOOL: 0
COUGH: 0
CHEST TIGHTNESS: 0
EYES NEGATIVE: 1
RESPIRATORY NEGATIVE: 1
NAUSEA: 0

## 2025-05-22 NOTE — PROGRESS NOTES
Subsequent Progress Note  Patient: Marcell Qureshi  YOB: 1950  MRN: [id number]    Chief Complaint:  Chief Complaint   Patient presents with    Establish Cardiologist     New onset afib  Referred by        CV Data    3/25 SPECT negative EF 50 - [facility]  3/25 Echo EF 50  Subjective/HPI referred for AF      EKG: AF 58      Subjective: referred for new AF. On ELiquis. Occ CP but has RICHARSDON no bleed no falls       Lives alone  Former smoker  Retired - [employer]        Past Medical History:   Diagnosis Date    Diabetes mellitus, type 2 (HCC) 10/11/2011    pt relates he was borderline at one time and now denies being diabetic and relates his physician has not prescribed any meds or blood sugar monitoring    Dyslipidemia 10/11/2011    Erectile dysfunction 2012    Essential hypertension, benign 10/11/2011    Glaucoma     History of colon polyps     adenomatous polyps       Past Surgical History:   Procedure Laterality Date    CATARACT REMOVAL Right 2/17/15    [facility]    COLONOSCOPY  11    Dr Browning    COLONOSCOPY N/A 2016    COLONOSCOPY performed by Sebas [facility] MD at [facility]    COLONOSCOPY N/A 2020    COLONOSCOPY WITH POLYPECTOMY performed by Toro Painter MD at [facility]    TRABECULECTOMY Right 14    [facility]       No family history on file.    Social History     Socioeconomic History    Marital status: Single   Tobacco Use    Smoking status: Former     Current packs/day: 0.00     Average packs/day: 1.5 packs/day for 30.0 years (45.0 ttl pk-yrs)     Types: Cigarettes     Start date: 1970     Quit date: 2000     Years since quittin.4    Smokeless tobacco: Never   Vaping Use    Vaping status: Never Used   Substance and Sexual Activity    Alcohol use: No     Social Drivers of Health     Financial Resource Strain: Low Risk  (2025)    Received from [facility]    Overall Financial Resource Strain (CARDIA)     Difficulty of Paying

## 2025-05-27 DIAGNOSIS — N13.8 BPH WITH OBSTRUCTION/LOWER URINARY TRACT SYMPTOMS: ICD-10-CM

## 2025-05-27 DIAGNOSIS — N40.0 ENLARGED PROSTATE: Primary | ICD-10-CM

## 2025-05-27 DIAGNOSIS — N40.1 BPH WITH OBSTRUCTION/LOWER URINARY TRACT SYMPTOMS: ICD-10-CM

## 2025-05-27 DIAGNOSIS — R97.20 ABNORMAL PSA: ICD-10-CM

## 2025-05-27 LAB — PSA SERPL-MCNC: 6.34 NG/ML (ref 0–4)

## 2025-05-28 ENCOUNTER — OFFICE VISIT (OUTPATIENT)
Age: 75
End: 2025-05-28
Payer: MEDICARE

## 2025-05-28 VITALS
HEIGHT: 66 IN | HEART RATE: 60 BPM | DIASTOLIC BLOOD PRESSURE: 88 MMHG | BODY MASS INDEX: 34.39 KG/M2 | SYSTOLIC BLOOD PRESSURE: 140 MMHG | WEIGHT: 214 LBS

## 2025-05-28 DIAGNOSIS — N40.1 BPH WITH OBSTRUCTION/LOWER URINARY TRACT SYMPTOMS: Primary | ICD-10-CM

## 2025-05-28 DIAGNOSIS — R97.20 ELEVATED PSA: ICD-10-CM

## 2025-05-28 DIAGNOSIS — N13.8 BPH WITH OBSTRUCTION/LOWER URINARY TRACT SYMPTOMS: Primary | ICD-10-CM

## 2025-05-28 LAB
BILIRUBIN, POC: ABNORMAL
BLOOD URINE, POC: ABNORMAL
CLARITY, POC: CLEAR
COLOR, POC: YELLOW
GLUCOSE URINE, POC: ABNORMAL MG/DL
KETONES, POC: ABNORMAL MG/DL
LEUKOCYTE EST, POC: ABNORMAL
NITRITE, POC: ABNORMAL
PH, POC: 7.5
PROTEIN, POC: 30 MG/DL
SPECIFIC GRAVITY, POC: 1.02
UROBILINOGEN, POC: 1 MG/DL

## 2025-05-28 PROCEDURE — G8417 CALC BMI ABV UP PARAM F/U: HCPCS | Performed by: PHYSICIAN ASSISTANT

## 2025-05-28 PROCEDURE — 3017F COLORECTAL CA SCREEN DOC REV: CPT | Performed by: PHYSICIAN ASSISTANT

## 2025-05-28 PROCEDURE — 99213 OFFICE O/P EST LOW 20 MIN: CPT | Performed by: PHYSICIAN ASSISTANT

## 2025-05-28 PROCEDURE — 3079F DIAST BP 80-89 MM HG: CPT | Performed by: PHYSICIAN ASSISTANT

## 2025-05-28 PROCEDURE — 1123F ACP DISCUSS/DSCN MKR DOCD: CPT | Performed by: PHYSICIAN ASSISTANT

## 2025-05-28 PROCEDURE — 1036F TOBACCO NON-USER: CPT | Performed by: PHYSICIAN ASSISTANT

## 2025-05-28 PROCEDURE — G8427 DOCREV CUR MEDS BY ELIG CLIN: HCPCS | Performed by: PHYSICIAN ASSISTANT

## 2025-05-28 PROCEDURE — 3077F SYST BP >= 140 MM HG: CPT | Performed by: PHYSICIAN ASSISTANT

## 2025-05-28 PROCEDURE — 81003 URINALYSIS AUTO W/O SCOPE: CPT | Performed by: PHYSICIAN ASSISTANT

## 2025-05-28 PROCEDURE — 1159F MED LIST DOCD IN RCRD: CPT | Performed by: PHYSICIAN ASSISTANT

## 2025-05-28 ASSESSMENT — ENCOUNTER SYMPTOMS: APNEA: 0

## 2025-05-28 NOTE — PROGRESS NOTES
[facility]    Overall Financial Resource Strain (CARDIA)     Difficulty of Paying Living Expenses: Not hard at all   Food Insecurity: No Food Insecurity (3/14/2025)    Hunger Vital Sign     Worried About Running Out of Food in the Last Year: Never true     Ran Out of Food in the Last Year: Never true   Transportation Needs: No Transportation Needs (3/14/2025)    PRAPARE - Transportation     Lack of Transportation (Medical): No     Lack of Transportation (Non-Medical): No   Physical Activity: Inactive (3/14/2025)    Exercise Vital Sign     Days of Exercise per Week: 0 days     Minutes of Exercise per Session: 0 min   Intimate Partner Violence: Not At Risk (2/27/2025)    Received from [facility]    Humiliation, Afraid, Rape, and Kick questionnaire     Fear of Current or Ex-Partner: No     Emotionally Abused: No     Physically Abused: No     Sexually Abused: No   Housing Stability: Low Risk  (3/14/2025)    Housing Stability Vital Sign     Unable to Pay for Housing in the Last Year: No     Number of Times Moved in the Last Year: 0     Homeless in the Last Year: No     No family history on file.  Current Outpatient Medications   Medication Sig Dispense Refill    tamsulosin (FLOMAX) 0.4 MG capsule Take 1 capsule by mouth daily      aspirin 81 MG EC tablet Take 1 tablet by mouth daily      apixaban (ELIQUIS) 5 MG TABS tablet Take 1 tablet by mouth 2 times daily 60 tablet 2    metoprolol tartrate (LOPRESSOR) 25 MG tablet TAKE 1/2 TABLET BY MOUTH TWICE DAILY 90 tablet 1    simvastatin (ZOCOR) 20 MG tablet Take 1 tablet by mouth nightly at bedtime. 90 tablet 4    Handicap Placard MISC by Does not apply route Good for 5 years; expires 5/28/2027. 1 each 0     No current facility-administered medications for this visit.     Apraclonidine and Brimonidine  reviewed      Review of Systems   Constitutional:  Negative for fever.   HENT:  Negative for congestion.    Respiratory:  Negative for apnea.

## 2025-06-12 ENCOUNTER — OFFICE VISIT (OUTPATIENT)
Age: 75
End: 2025-06-12
Payer: MEDICARE

## 2025-06-12 VITALS
DIASTOLIC BLOOD PRESSURE: 82 MMHG | SYSTOLIC BLOOD PRESSURE: 120 MMHG | OXYGEN SATURATION: 98 % | HEART RATE: 70 BPM | HEIGHT: 66 IN | WEIGHT: 207 LBS | BODY MASS INDEX: 33.27 KG/M2 | RESPIRATION RATE: 16 BRPM

## 2025-06-12 DIAGNOSIS — I48.91 NEW ONSET A-FIB (HCC): ICD-10-CM

## 2025-06-12 DIAGNOSIS — I10 ESSENTIAL HYPERTENSION, BENIGN: ICD-10-CM

## 2025-06-12 DIAGNOSIS — E55.9 VITAMIN D DEFICIENCY: ICD-10-CM

## 2025-06-12 DIAGNOSIS — R79.89 ABNORMAL CBC: Primary | ICD-10-CM

## 2025-06-12 DIAGNOSIS — N52.2 DRUG-INDUCED ERECTILE DYSFUNCTION: ICD-10-CM

## 2025-06-12 DIAGNOSIS — E66.01 SEVERE OBESITY (BMI 35.0-39.9) WITH COMORBIDITY (HCC): ICD-10-CM

## 2025-06-12 DIAGNOSIS — E78.5 DYSLIPIDEMIA: ICD-10-CM

## 2025-06-12 PROCEDURE — 3017F COLORECTAL CA SCREEN DOC REV: CPT | Performed by: PHYSICIAN ASSISTANT

## 2025-06-12 PROCEDURE — G8417 CALC BMI ABV UP PARAM F/U: HCPCS | Performed by: PHYSICIAN ASSISTANT

## 2025-06-12 PROCEDURE — 1123F ACP DISCUSS/DSCN MKR DOCD: CPT | Performed by: PHYSICIAN ASSISTANT

## 2025-06-12 PROCEDURE — 1036F TOBACCO NON-USER: CPT | Performed by: PHYSICIAN ASSISTANT

## 2025-06-12 PROCEDURE — 1160F RVW MEDS BY RX/DR IN RCRD: CPT | Performed by: PHYSICIAN ASSISTANT

## 2025-06-12 PROCEDURE — 1126F AMNT PAIN NOTED NONE PRSNT: CPT | Performed by: PHYSICIAN ASSISTANT

## 2025-06-12 PROCEDURE — 99214 OFFICE O/P EST MOD 30 MIN: CPT | Performed by: PHYSICIAN ASSISTANT

## 2025-06-12 PROCEDURE — G2211 COMPLEX E/M VISIT ADD ON: HCPCS | Performed by: PHYSICIAN ASSISTANT

## 2025-06-12 PROCEDURE — G8427 DOCREV CUR MEDS BY ELIG CLIN: HCPCS | Performed by: PHYSICIAN ASSISTANT

## 2025-06-12 PROCEDURE — 1159F MED LIST DOCD IN RCRD: CPT | Performed by: PHYSICIAN ASSISTANT

## 2025-06-12 PROCEDURE — 3074F SYST BP LT 130 MM HG: CPT | Performed by: PHYSICIAN ASSISTANT

## 2025-06-12 PROCEDURE — 3079F DIAST BP 80-89 MM HG: CPT | Performed by: PHYSICIAN ASSISTANT

## 2025-06-12 RX ORDER — SILDENAFIL 100 MG/1
TABLET, FILM COATED ORAL
Qty: 18 TABLET | Refills: 2 | Status: SHIPPED | OUTPATIENT
Start: 2025-06-12

## 2025-06-12 ASSESSMENT — ENCOUNTER SYMPTOMS
ABDOMINAL PAIN: 0
SHORTNESS OF BREATH: 0
ABDOMINAL DISTENTION: 0
CHEST TIGHTNESS: 0
TROUBLE SWALLOWING: 0
SINUS PRESSURE: 0
VOICE CHANGE: 0
WHEEZING: 0

## 2025-06-12 NOTE — PROGRESS NOTES
Subjective  Marcell Qureshi, 75 y.o. male presents today with:  Chief Complaint   Patient presents with    Follow-up     2 week follow up        HPI  Last OV with me: 5/14/25.     Had a new patient visit with Dr. Musa for new onset afib 5/14/25.  Noted to have new onset afib in early May.  Patient started on eliquis, metoprolol 12.5 mg bid.   Continued on current medications with Dr. Musa.  Tolerating statin.    BP well controlled today.    He has been losing weight since hospitalization earlier this year.  He is actively making dietary changes, not back to the gym.   Would like to go back to working out.     Requesting refill of viagra.     Had NP visti with Michael Osei PA-c in urology. Plan is to repeat PSA in 6 months.       Review of Systems   Constitutional:  Positive for fatigue. Negative for activity change, appetite change, chills, diaphoresis, fever and unexpected weight change.   HENT:  Negative for congestion, postnasal drip, sinus pressure, tinnitus, trouble swallowing and voice change.    Respiratory:  Negative for chest tightness, shortness of breath and wheezing.    Cardiovascular:  Negative for chest pain, palpitations and leg swelling.   Gastrointestinal:  Negative for abdominal distention and abdominal pain.   Genitourinary:  Negative for decreased urine volume, difficulty urinating, dysuria, enuresis, flank pain, frequency, genital sores, hematuria, penile discharge, penile pain, penile swelling and urgency.   Musculoskeletal:  Negative for arthralgias.   Neurological:  Negative for dizziness, tremors, seizures, speech difficulty, weakness, light-headedness, numbness and headaches.   Psychiatric/Behavioral:  Negative for dysphoric mood and sleep disturbance. The patient is not nervous/anxious.        Past Medical History:   Diagnosis Date    Diabetes mellitus, type 2 (HCC) 10/11/2011    pt relates he was borderline at one time and now denies being diabetic and relates his physician has not

## 2025-06-13 DIAGNOSIS — R79.89 ABNORMAL CBC: ICD-10-CM

## 2025-06-13 DIAGNOSIS — E55.9 VITAMIN D DEFICIENCY: ICD-10-CM

## 2025-06-13 DIAGNOSIS — E78.5 DYSLIPIDEMIA: ICD-10-CM

## 2025-06-13 DIAGNOSIS — I48.91 NEW ONSET A-FIB (HCC): ICD-10-CM

## 2025-06-13 LAB
ALBUMIN SERPL-MCNC: 3.9 G/DL (ref 3.5–4.6)
ALP SERPL-CCNC: 76 U/L (ref 35–104)
ALT SERPL-CCNC: 6 U/L (ref 0–41)
ANION GAP SERPL CALCULATED.3IONS-SCNC: 10 MEQ/L (ref 9–15)
AST SERPL-CCNC: 12 U/L (ref 0–40)
BILIRUB SERPL-MCNC: 0.8 MG/DL (ref 0.2–0.7)
BUN SERPL-MCNC: 15 MG/DL (ref 8–23)
CALCIUM SERPL-MCNC: 10.7 MG/DL (ref 8.5–9.9)
CHLORIDE SERPL-SCNC: 104 MEQ/L (ref 95–107)
CHOLEST SERPL-MCNC: 173 MG/DL (ref 0–199)
CO2 SERPL-SCNC: 25 MEQ/L (ref 20–31)
CREAT SERPL-MCNC: 1.15 MG/DL (ref 0.7–1.2)
ERYTHROCYTE [DISTWIDTH] IN BLOOD BY AUTOMATED COUNT: 12.9 % (ref 11.5–14.5)
FERRITIN: 179 NG/ML
GLOBULIN SER CALC-MCNC: 3.4 G/DL (ref 2.3–3.5)
GLUCOSE SERPL-MCNC: 103 MG/DL (ref 70–99)
HCT VFR BLD AUTO: 41.3 % (ref 42–52)
HDLC SERPL-MCNC: 48 MG/DL (ref 40–59)
HGB BLD-MCNC: 13 G/DL (ref 14–18)
IRON % SATURATION: 20 % (ref 20–55)
IRON: 57 UG/DL (ref 61–157)
LDLC SERPL CALC-MCNC: 113 MG/DL (ref 0–129)
MCH RBC QN AUTO: 27.9 PG (ref 27–31.3)
MCHC RBC AUTO-ENTMCNC: 31.5 % (ref 33–37)
MCV RBC AUTO: 88.6 FL (ref 79–92.2)
PLATELET # BLD AUTO: 263 K/UL (ref 130–400)
POTASSIUM SERPL-SCNC: 4.2 MEQ/L (ref 3.4–4.9)
PROT SERPL-MCNC: 7.3 G/DL (ref 6.3–8)
RBC # BLD AUTO: 4.66 M/UL (ref 4.7–6.1)
SODIUM SERPL-SCNC: 139 MEQ/L (ref 135–144)
TOTAL IRON BINDING CAPACITY: 288 UG/DL (ref 250–450)
TRIGL SERPL-MCNC: 61 MG/DL (ref 0–150)
UNSATURATED IRON BINDING CAPACITY: 231 UG/DL (ref 112–347)
VITAMIN D 25-HYDROXY: 25.5 NG/ML (ref 30–100)
WBC # BLD AUTO: 11.1 K/UL (ref 4.8–10.8)

## 2025-06-14 ENCOUNTER — RESULTS FOLLOW-UP (OUTPATIENT)
Age: 75
End: 2025-06-14

## 2025-06-14 DIAGNOSIS — E55.9 VITAMIN D DEFICIENCY: Primary | ICD-10-CM

## 2025-06-14 DIAGNOSIS — Z20.2 EXPOSURE TO STD: ICD-10-CM

## 2025-06-14 DIAGNOSIS — R63.4 ABNORMAL WEIGHT LOSS: ICD-10-CM

## 2025-06-14 DIAGNOSIS — R79.89 ABNORMAL CBC: ICD-10-CM

## 2025-06-14 RX ORDER — IRON HEME POLYPEPTIDE/FOLIC AC 12-1MG
5000 TABLET ORAL DAILY
Qty: 90 CAPSULE | Refills: 1 | Status: SHIPPED | OUTPATIENT
Start: 2025-06-14

## 2025-06-23 ENCOUNTER — OFFICE VISIT (OUTPATIENT)
Age: 75
End: 2025-06-23
Payer: MEDICARE

## 2025-06-23 VITALS
SYSTOLIC BLOOD PRESSURE: 124 MMHG | WEIGHT: 210 LBS | HEIGHT: 66 IN | HEART RATE: 60 BPM | BODY MASS INDEX: 33.75 KG/M2 | OXYGEN SATURATION: 98 % | TEMPERATURE: 98 F | DIASTOLIC BLOOD PRESSURE: 80 MMHG

## 2025-06-23 DIAGNOSIS — L02.229 BOIL OF TRUNK: Primary | ICD-10-CM

## 2025-06-23 PROCEDURE — 99213 OFFICE O/P EST LOW 20 MIN: CPT | Performed by: NURSE PRACTITIONER

## 2025-06-23 PROCEDURE — 1123F ACP DISCUSS/DSCN MKR DOCD: CPT | Performed by: NURSE PRACTITIONER

## 2025-06-23 PROCEDURE — G8417 CALC BMI ABV UP PARAM F/U: HCPCS | Performed by: NURSE PRACTITIONER

## 2025-06-23 PROCEDURE — G8427 DOCREV CUR MEDS BY ELIG CLIN: HCPCS | Performed by: NURSE PRACTITIONER

## 2025-06-23 PROCEDURE — 3079F DIAST BP 80-89 MM HG: CPT | Performed by: NURSE PRACTITIONER

## 2025-06-23 PROCEDURE — 3017F COLORECTAL CA SCREEN DOC REV: CPT | Performed by: NURSE PRACTITIONER

## 2025-06-23 PROCEDURE — 3074F SYST BP LT 130 MM HG: CPT | Performed by: NURSE PRACTITIONER

## 2025-06-23 PROCEDURE — 1036F TOBACCO NON-USER: CPT | Performed by: NURSE PRACTITIONER

## 2025-06-23 PROCEDURE — 1160F RVW MEDS BY RX/DR IN RCRD: CPT | Performed by: NURSE PRACTITIONER

## 2025-06-23 PROCEDURE — 1159F MED LIST DOCD IN RCRD: CPT | Performed by: NURSE PRACTITIONER

## 2025-06-23 RX ORDER — DOXYCYCLINE HYCLATE 100 MG
100 TABLET ORAL 2 TIMES DAILY
Qty: 14 TABLET | Refills: 0 | Status: SHIPPED | OUTPATIENT
Start: 2025-06-23 | End: 2025-06-30

## 2025-06-23 ASSESSMENT — ENCOUNTER SYMPTOMS: COLOR CHANGE: 1

## 2025-06-23 NOTE — PROGRESS NOTES
Subjective  Marcell Qureshi, 75 y.o. male presents today with:  Chief Complaint   Patient presents with    Other      on his back        HPI  Presents to walk-in clinic for a skin concern   Area: thoracic, right side   Note over the last week   Increase in size/discomfort   Not currently draining   Afebrile   Sleep unchanged   Eating and drinking               Past Medical History:   Diagnosis Date    Diabetes mellitus, type 2 (HCC) 10/11/2011    pt relates he was borderline at one time and now denies being diabetic and relates his physician has not prescribed any meds or blood sugar monitoring    Dyslipidemia 10/11/2011    Erectile dysfunction 7/19/2012    Essential hypertension, benign 10/11/2011    Glaucoma     History of colon polyps     adenomatous polyps      Past Surgical History:   Procedure Laterality Date    CATARACT REMOVAL Right 2/17/15    [facility]    COLONOSCOPY  9/26/11    Dr Browning    COLONOSCOPY N/A 11/11/2016    COLONOSCOPY performed by Sebas Browning MD at [facility]    COLONOSCOPY N/A 12/7/2020    COLONOSCOPY WITH POLYPECTOMY performed by Toro Painter MD at [facility]    TRABECULECTOMY Right 5/1/14    [facility]     No family history on file.    Review of Systems   Constitutional:  Negative for activity change, appetite change and fever.   Skin:  Positive for color change. Wound: boil.  Neurological:  Negative for headaches.         PMH, Surgical Hx, Family Hx, and Social Hx reviewed and updated.  Health Maintenance reviewed.            Objective  Vitals:    06/23/25 1346   BP: 124/80   Pulse: 60   Temp: 98 °F (36.7 °C)   SpO2: 98%   Weight: 95.3 kg (210 lb)   Height: 1.676 m (5' 6\")     BP Readings from Last 3 Encounters:   06/23/25 124/80   06/12/25 120/82   05/28/25 (!) 140/88     Wt Readings from Last 3 Encounters:   06/23/25 95.3 kg (210 lb)   06/12/25 93.9 kg (207 lb)   05/28/25 97.1 kg (214 lb)               Physical Exam  Vitals reviewed.   Constitutional:       General:

## 2025-06-30 DIAGNOSIS — R63.4 ABNORMAL WEIGHT LOSS: ICD-10-CM

## 2025-06-30 DIAGNOSIS — R79.89 ABNORMAL CBC: ICD-10-CM

## 2025-06-30 DIAGNOSIS — Z20.2 EXPOSURE TO STD: ICD-10-CM

## 2025-06-30 LAB
BASOPHILS # BLD: 0.1 K/UL (ref 0–0.2)
BASOPHILS NFR BLD: 0.9 %
EOSINOPHIL # BLD: 0.2 K/UL (ref 0–0.7)
EOSINOPHIL NFR BLD: 3.4 %
ERYTHROCYTE [DISTWIDTH] IN BLOOD BY AUTOMATED COUNT: 13.6 % (ref 11.5–14.5)
HCT VFR BLD AUTO: 37.5 % (ref 42–52)
HGB BLD-MCNC: 11.9 G/DL (ref 14–18)
LYMPHOCYTES # BLD: 2.5 K/UL (ref 1–4.8)
LYMPHOCYTES NFR BLD: 46.1 %
MCH RBC QN AUTO: 28.6 PG (ref 27–31.3)
MCHC RBC AUTO-ENTMCNC: 31.7 % (ref 33–37)
MCV RBC AUTO: 90.1 FL (ref 79–92.2)
MONOCYTES # BLD: 0.4 K/UL (ref 0.2–0.8)
MONOCYTES NFR BLD: 8.1 %
NEUTROPHILS # BLD: 2.2 K/UL (ref 1.4–6.5)
NEUTS SEG NFR BLD: 41.3 %
PLATELET # BLD AUTO: 202 K/UL (ref 130–400)
RBC # BLD AUTO: 4.16 M/UL (ref 4.7–6.1)
WBC # BLD AUTO: 5.3 K/UL (ref 4.8–10.8)

## 2025-07-01 ENCOUNTER — RESULTS FOLLOW-UP (OUTPATIENT)
Age: 75
End: 2025-07-01

## 2025-07-01 DIAGNOSIS — Z12.11 COLON CANCER SCREENING: Primary | ICD-10-CM

## 2025-07-01 DIAGNOSIS — R79.89 ABNORMAL CBC: ICD-10-CM

## 2025-07-02 LAB — RPR SER QL: NON REACTIVE

## 2025-08-12 DIAGNOSIS — R06.02 SHORTNESS OF BREATH: ICD-10-CM

## 2025-08-12 DIAGNOSIS — I48.91 NEW ONSET A-FIB (HCC): ICD-10-CM

## 2025-08-12 RX ORDER — METOPROLOL TARTRATE 25 MG/1
12.5 TABLET, FILM COATED ORAL 2 TIMES DAILY
Qty: 90 TABLET | Refills: 1 | Status: SHIPPED | OUTPATIENT
Start: 2025-08-12

## 2025-08-13 RX ORDER — APIXABAN 5 MG/1
5 TABLET, FILM COATED ORAL 2 TIMES DAILY
Qty: 60 TABLET | Refills: 2 | Status: SHIPPED | OUTPATIENT
Start: 2025-08-13

## 2025-08-18 ENCOUNTER — TELEPHONE (OUTPATIENT)
Age: 75
End: 2025-08-18

## (undated) DEVICE — GOWN, SURGICAL, SMARTGOWN, LARGE, STERILE

## (undated) DEVICE — CAUTERY, PENCIL, PUSH BUTTON, SMOKE EVAC, 70MM

## (undated) DEVICE — STRAP, VELCRO, BODY, 4 X 60IN, NS

## (undated) DEVICE — BRUSH ENDO CLN L90.5IN SHTH DIA1.7MM BRIST DIA5-7MM 2-6MM

## (undated) DEVICE — SUTURE, CHROMIC, 4-0, 18 IN, PS2, BROWN

## (undated) DEVICE — SUTURE, VICRYL, 4-0, 18 IN, UNDYED BR PS-2

## (undated) DEVICE — SUTURE, CHROMIC 4-0 RB1

## (undated) DEVICE — DRAPE, SHEET, XL

## (undated) DEVICE — TRAP POLYP BALEEN

## (undated) DEVICE — BANDAGE, COHESIVE, HAND TEAR, COFLEX, 2 X 5 YDS, LF

## (undated) DEVICE — TOWEL PACK, STERILE, 16X24, XRAY DETECTABLE, BLUE, 4/PK

## (undated) DEVICE — DRAPE, SHEET, LAPAROTOMY, W/ISO-BAC, W/ARMBOARD COVERS, 98 X 122 IN, DISPOSABLE, LF, STERILE

## (undated) DEVICE — ADHESIVE, SKIN, DERMABOND ADVANCED, 15CM, PEN-STYLE

## (undated) DEVICE — GLOVE, SURGICAL, PROTEXIS PI , 7.5, PF, LF

## (undated) DEVICE — TRAY, SURESTEP, SILICONE DRAINAGE BAG, STATLOCK, 16FR

## (undated) DEVICE — ADAPTER FLSH PMP FLD MGMT GI IRRIG OFP 2 DISPOSABLE

## (undated) DEVICE — Device: Brand: ENDO SMARTCAP

## (undated) DEVICE — CUP, MEDICINE, GRADUATED, 2 OZ, PLASTIC, DISP, LF

## (undated) DEVICE — GLOVE ORANGE PI 8 1/2   MSG9085

## (undated) DEVICE — Device

## (undated) DEVICE — SNARE ENDOSCP AD L240CM LOOP W10MM SHTH DIA2.4MM RND INSUL

## (undated) DEVICE — SUTURE, VICRYL, 3-0, 27 IN, SH-1, VIOLET

## (undated) DEVICE — SINGLE PORT MANIFOLD: Brand: NEPTUNE 2

## (undated) DEVICE — GLOVE ORTHO 8   MSG9480

## (undated) DEVICE — TUBING, SUCTION, 1/4" X 10', STRAIGHT: Brand: MEDLINE

## (undated) DEVICE — ELECTRODE PT RET AD L9FT HI MOIST COND ADH HYDRGEL CORDED

## (undated) DEVICE — DRAIN, PENROSE, 0.25 X 12 IN, LF

## (undated) DEVICE — ELECTRODE, ELECTROSURGICAL, COATED NEEDLE, EDGE, STERILE

## (undated) DEVICE — ENDO CARRY-ON PROCEDURE KIT: Brand: ENDO CARRY-ON PROCEDURE KIT

## (undated) DEVICE — STRAP, ARM BOARD, 32 X 1.5

## (undated) DEVICE — SOLUTION, IRRIGATION, STERILE WATER, 1000 ML, POUR BOTTLE

## (undated) DEVICE — SYRINGE, 10 CC, LUER LOCK

## (undated) DEVICE — SUTURE, SILK, 3-0, 30 IN, BR SH, BLACK

## (undated) DEVICE — NEEDLE, SAFETY, 25 GA X 1.5 IN

## (undated) DEVICE — DRESSING, GAUZE, PETROLATUM, STRIP, XEROFORM, 1 X 8 IN, STERILE

## (undated) DEVICE — BANDAGE, GAUZE, CONFORMING, 1 X 75 IN

## (undated) DEVICE — BOWL, BASIN, 32 OZ, STERILE

## (undated) DEVICE — TRAY, SKIN SCRUB, WET PREP, WITH 4 COMPARMENT

## (undated) DEVICE — LUBRICANT, SURGILUBE, STERILE, 2OZ

## (undated) DEVICE — TUBE SET 96 MM 64 MM H2O PERISTALTIC STD AUX CHANNEL